# Patient Record
Sex: FEMALE | Race: WHITE | NOT HISPANIC OR LATINO | Employment: OTHER | URBAN - METROPOLITAN AREA
[De-identification: names, ages, dates, MRNs, and addresses within clinical notes are randomized per-mention and may not be internally consistent; named-entity substitution may affect disease eponyms.]

---

## 2017-01-03 ENCOUNTER — APPOINTMENT (OUTPATIENT)
Dept: LAB | Facility: CLINIC | Age: 62
End: 2017-01-03
Payer: COMMERCIAL

## 2017-01-03 ENCOUNTER — TRANSCRIBE ORDERS (OUTPATIENT)
Dept: LAB | Facility: CLINIC | Age: 62
End: 2017-01-03

## 2017-01-03 DIAGNOSIS — I10 ESSENTIAL HYPERTENSION, MALIGNANT: Primary | ICD-10-CM

## 2017-01-03 LAB — VENIPUNCTURE: NORMAL

## 2017-01-03 PROCEDURE — 36415 COLL VENOUS BLD VENIPUNCTURE: CPT | Performed by: NURSE PRACTITIONER

## 2017-01-05 ENCOUNTER — LAB CONVERSION - ENCOUNTER (OUTPATIENT)
Dept: OTHER | Facility: OTHER | Age: 62
End: 2017-01-05

## 2017-01-05 ENCOUNTER — GENERIC CONVERSION - ENCOUNTER (OUTPATIENT)
Dept: OTHER | Facility: OTHER | Age: 62
End: 2017-01-05

## 2017-01-05 LAB
T4 FREE SERPL-MCNC: 1.2 NG/DL (ref 0.8–1.8)
TSH SERPL DL<=0.05 MIU/L-ACNC: 6.16 MIU/L (ref 0.4–4.5)

## 2017-01-19 ENCOUNTER — ALLSCRIPTS OFFICE VISIT (OUTPATIENT)
Dept: OTHER | Facility: OTHER | Age: 62
End: 2017-01-19

## 2017-01-19 DIAGNOSIS — Z12.31 ENCOUNTER FOR SCREENING MAMMOGRAM FOR MALIGNANT NEOPLASM OF BREAST: ICD-10-CM

## 2017-02-27 ENCOUNTER — GENERIC CONVERSION - ENCOUNTER (OUTPATIENT)
Dept: OTHER | Facility: OTHER | Age: 62
End: 2017-02-27

## 2017-04-14 ENCOUNTER — GENERIC CONVERSION - ENCOUNTER (OUTPATIENT)
Dept: OTHER | Facility: OTHER | Age: 62
End: 2017-04-14

## 2017-04-19 ENCOUNTER — TRANSCRIBE ORDERS (OUTPATIENT)
Dept: LAB | Facility: CLINIC | Age: 62
End: 2017-04-19

## 2017-04-19 ENCOUNTER — APPOINTMENT (OUTPATIENT)
Dept: LAB | Facility: CLINIC | Age: 62
End: 2017-04-19
Payer: COMMERCIAL

## 2017-04-19 DIAGNOSIS — E78.2 MIXED HYPERLIPIDEMIA: ICD-10-CM

## 2017-04-19 DIAGNOSIS — I10 ESSENTIAL HYPERTENSION, MALIGNANT: Primary | ICD-10-CM

## 2017-04-19 DIAGNOSIS — R94.6 NONSPECIFIC ABNORMAL RESULTS OF THYROID FUNCTION STUDY: ICD-10-CM

## 2017-04-19 DIAGNOSIS — I10 ESSENTIAL HYPERTENSION, MALIGNANT: ICD-10-CM

## 2017-04-19 LAB
ALBUMIN SERPL BCP-MCNC: 3.8 G/DL (ref 3.5–5)
ALP SERPL-CCNC: 80 U/L (ref 46–116)
ALT SERPL W P-5'-P-CCNC: 59 U/L (ref 12–78)
ANION GAP SERPL CALCULATED.3IONS-SCNC: 7 MMOL/L (ref 4–13)
AST SERPL W P-5'-P-CCNC: 25 U/L (ref 5–45)
BILIRUB SERPL-MCNC: 0.42 MG/DL (ref 0.2–1)
BUN SERPL-MCNC: 12 MG/DL (ref 5–25)
CALCIUM SERPL-MCNC: 9.3 MG/DL (ref 8.3–10.1)
CHLORIDE SERPL-SCNC: 102 MMOL/L (ref 100–108)
CHOLEST SERPL-MCNC: 226 MG/DL (ref 50–200)
CO2 SERPL-SCNC: 29 MMOL/L (ref 21–32)
CREAT SERPL-MCNC: 0.66 MG/DL (ref 0.6–1.3)
GFR SERPL CREATININE-BSD FRML MDRD: >60 ML/MIN/1.73SQ M
GLUCOSE P FAST SERPL-MCNC: 97 MG/DL (ref 65–99)
HDLC SERPL-MCNC: 61 MG/DL (ref 40–60)
LDLC SERPL CALC-MCNC: 138 MG/DL (ref 0–100)
POTASSIUM SERPL-SCNC: 3.9 MMOL/L (ref 3.5–5.3)
PROT SERPL-MCNC: 7.3 G/DL (ref 6.4–8.2)
SODIUM SERPL-SCNC: 138 MMOL/L (ref 136–145)
T3FREE SERPL-MCNC: 1.94 PG/ML (ref 2.3–4.2)
T4 FREE SERPL-MCNC: 0.86 NG/DL (ref 0.76–1.46)
TRIGL SERPL-MCNC: 134 MG/DL
TSH SERPL DL<=0.05 MIU/L-ACNC: 10.9 UIU/ML (ref 0.36–3.74)

## 2017-04-19 PROCEDURE — 80061 LIPID PANEL: CPT | Performed by: NURSE PRACTITIONER

## 2017-04-19 PROCEDURE — 80053 COMPREHEN METABOLIC PANEL: CPT | Performed by: NURSE PRACTITIONER

## 2017-04-19 PROCEDURE — 84439 ASSAY OF FREE THYROXINE: CPT

## 2017-04-19 PROCEDURE — 84481 FREE ASSAY (FT-3): CPT

## 2017-04-19 PROCEDURE — 36415 COLL VENOUS BLD VENIPUNCTURE: CPT | Performed by: NURSE PRACTITIONER

## 2017-04-19 PROCEDURE — 84443 ASSAY THYROID STIM HORMONE: CPT | Performed by: NURSE PRACTITIONER

## 2017-04-20 ENCOUNTER — GENERIC CONVERSION - ENCOUNTER (OUTPATIENT)
Dept: OTHER | Facility: OTHER | Age: 62
End: 2017-04-20

## 2017-05-10 ENCOUNTER — ALLSCRIPTS OFFICE VISIT (OUTPATIENT)
Dept: OTHER | Facility: OTHER | Age: 62
End: 2017-05-10

## 2017-06-23 ENCOUNTER — GENERIC CONVERSION - ENCOUNTER (OUTPATIENT)
Dept: OTHER | Facility: OTHER | Age: 62
End: 2017-06-23

## 2017-07-21 ENCOUNTER — APPOINTMENT (OUTPATIENT)
Dept: LAB | Facility: CLINIC | Age: 62
End: 2017-07-21
Payer: COMMERCIAL

## 2017-07-21 ENCOUNTER — TRANSCRIBE ORDERS (OUTPATIENT)
Dept: LAB | Facility: CLINIC | Age: 62
End: 2017-07-21

## 2017-07-21 DIAGNOSIS — E03.9 UNSPECIFIED HYPOTHYROIDISM: ICD-10-CM

## 2017-07-21 DIAGNOSIS — E03.9 UNSPECIFIED HYPOTHYROIDISM: Primary | ICD-10-CM

## 2017-07-21 LAB
T3FREE SERPL-MCNC: 2.26 PG/ML (ref 2.3–4.2)
T4 FREE SERPL-MCNC: 1.22 NG/DL (ref 0.76–1.46)
TSH SERPL DL<=0.05 MIU/L-ACNC: 4.02 UIU/ML (ref 0.36–3.74)

## 2017-07-21 PROCEDURE — 84481 FREE ASSAY (FT-3): CPT

## 2017-07-21 PROCEDURE — 84443 ASSAY THYROID STIM HORMONE: CPT

## 2017-07-21 PROCEDURE — 36415 COLL VENOUS BLD VENIPUNCTURE: CPT

## 2017-07-21 PROCEDURE — 84439 ASSAY OF FREE THYROXINE: CPT

## 2017-07-24 ENCOUNTER — GENERIC CONVERSION - ENCOUNTER (OUTPATIENT)
Dept: OTHER | Facility: OTHER | Age: 62
End: 2017-07-24

## 2017-08-09 ENCOUNTER — ALLSCRIPTS OFFICE VISIT (OUTPATIENT)
Dept: OTHER | Facility: OTHER | Age: 62
End: 2017-08-09

## 2017-10-02 DIAGNOSIS — E03.9 HYPOTHYROIDISM: ICD-10-CM

## 2017-10-02 DIAGNOSIS — E78.2 MIXED HYPERLIPIDEMIA: ICD-10-CM

## 2017-10-02 DIAGNOSIS — I10 ESSENTIAL (PRIMARY) HYPERTENSION: ICD-10-CM

## 2017-10-05 ENCOUNTER — GENERIC CONVERSION - ENCOUNTER (OUTPATIENT)
Dept: OTHER | Facility: OTHER | Age: 62
End: 2017-10-05

## 2017-11-10 ENCOUNTER — GENERIC CONVERSION - ENCOUNTER (OUTPATIENT)
Dept: OTHER | Facility: OTHER | Age: 62
End: 2017-11-10

## 2017-11-10 LAB — INTERPRETATION (HISTORICAL): NORMAL

## 2017-11-15 ENCOUNTER — ALLSCRIPTS OFFICE VISIT (OUTPATIENT)
Dept: OTHER | Facility: OTHER | Age: 62
End: 2017-11-15

## 2017-11-17 NOTE — PROGRESS NOTES
Assessment    1  Anxiety (300 00) (F41 9)   2  Acquired hypothyroidism (244 9) (E03 9)    Plan  Acquired hypothyroidism    · (1) FREE T3; Status:Active; Requested for:41Snt2953;    · (Q) T4, FREE, DIRECT DIALYSIS AND T4, TOTAL; Status:Active; Requestedfor:85Rly8364;    · (Q) TSH, 3RD GENERATION; Status:Active; Requested for:06Qrd3918; Anxiety    · Escitalopram Oxalate 5 MG Oral Tablet; TAKE 1 TABLET DAILY  Postprocedural hypothyroidism    · Levothyroxine Sodium 125 MCG Oral Tablet; 1 every day    Discussion/Summary    Free thyroid hormone levels are normal at this time  Minor reduction in TSH  Given patient's depression symptoms would avoid adjustment of thyroid medication at this time, repeat levels in six weeks to monitor for stability at goal  Advised she may do the blood work sooner if not feeling well   to the office in two weeks for re-evaluation of depression and anxiety  SSRI medication use discussed  The patient was counseled regarding diagnostic results,-- instructions for management,-- risk factor reductions,-- impressions,-- risks and benefits of treatment options,-- importance of compliance with treatment,-- lab review with lengthy Q&A to pt stated satisfaction as well as intake re increased depression and anxiety sx with m2keoedf cbt session during ov today  total time of encounter was 30 minutes-- and-- >50% minutes was spent counseling  Possible side effects of new medications were reviewed with the patient/guardian today  The treatment plan was reviewed with the patient/guardian  The patient/guardian understands and agrees with the treatment plan      Chief Complaint  F/U labs  ck/lpn      History of Present Illness  Patient here for lab follow-up and chronic problem visit  Ruben Gonzalez has acquired hypothyroidism post surgical removal of the thyroid  Medication increased within the past 3 to 6 months   She also has an associated hyperlipidemia that is in ongoing monitoring awaiting a euthyroid state   complaint of increased anxiety  Patient reports over the past few weeks she has noted increased anxiety and depression symptoms  She denies suicidal or homicidal ideation  She does note some minor difficulty getting in staying to sleep  She does know increase tearful episodes and increased stress  She periodically takes lorazepam for her anxiety with improvement in symptoms that is temporary  She notes a reduced ability to cope with increased stressors over the past few weeks/same time line noting increased anxiety  Review of Systems   Constitutional: No fever, no chills, feels well, no tiredness, no recent weight gain or weight loss  Eyes: No complaints of eye pain, no red eyes, no eyesight problems, no discharge, no dry eyes, no itching of eyes  ENT: no complaints of earache, no loss of hearing, no nose bleeds, no nasal discharge, no sore throat, no hoarseness  Cardiovascular: No complaints of slow heart rate, no fast heart rate, no chest pain, no palpitations, no leg claudication, no lower extremity edema  Respiratory: No complaints of shortness of breath, no wheezing, no cough, no SOB on exertion, no orthopnea, no PND  Gastrointestinal: No complaints of abdominal pain, no constipation, no nausea or vomiting, no diarrhea, no bloody stools  Genitourinary: No complaints of dysuria, no incontinence, no pelvic pain, no dysmenorrhea, no vaginal discharge or bleeding  Musculoskeletal: No complaints of arthralgias, no myalgias, no joint swelling or stiffness, no limb pain or swelling  Integumentary: No complaints of skin rash or lesions, no itching, no skin wounds, no breast pain or lump  Neurological: No complaints of headache, no confusion, no convulsions, no numbness, no dizziness or fainting, no tingling, no limb weakness, no difficulty walking    Psychiatric: anxiety,-- sleep disturbances-- and-- depression, but-- as noted in HPI-- and-- not suicidal   Endocrine: No complaints of proptosis, no hot flashes, no muscle weakness, no deepening of the voice, no feelings of weakness  Hematologic/Lymphatic: No complaints of swollen glands, no swollen glands in the neck, does not bleed easily, does not bruise easily  Active Problems    1  Abdominal wall hematoma, initial encounter (922 2) (S30 1XXA)   2  Acquired hypothyroidism (244 9) (E03 9)   3  Acute bacterial sinusitis (461 9) (J01 90,B96 89)   4  Anxiety (300 00) (F41 9)   5  Benign essential hypertension (401 1) (I10)   6  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)   7  Encounter for Papanicolaou cervical smear to confirm findings of recent normal smear following initial abnormal smear (V72 32) (Z01 42)   8  Encounter for screening for malignant neoplasm of colon (V76 51) (Z12 11)   9  Encounter for screening mammogram for breast cancer (V76 12) (Z12 31)   10  Low TSH level (794 5) (R94 6)   11  Mixed hyperlipidemia (272 2) (E78 2)   12  Need for prophylactic vaccination and inoculation against influenza (V04 81) (Z23)   13  Other specified fever (780 60) (R50 9)   14  Postprocedural hypothyroidism (244 0) (E89 0)   15  Thyroid nodule (241 0) (E04 1)    Past Medical History    1  History of Acute upper respiratory infection (465 9) (J06 9)   2  History of Cervicalgia (723 1) (M54 2)   3  History of Dysfunction of Eustachian tube, unspecified laterality (381 81) (H69 80)   4  History of acute bronchitis (V12 69) (Z87 09)   5  History of acute pharyngitis (V12 69) (Z87 09)   6  History of acute sinusitis (V12 69) (Z87 09)   7  History of acute sinusitis (V12 69) (Z87 09)   8  History of fatigue (V13 89) (Z87 898)   9  History of urinary tract infection (V13 02) (Z87 440)   10  History of Meralgia paresthetica of left side (355 1) (G57 12)   11  History of Meralgia paresthetica of right side (355 1) (G57 11)   12  History of Neck Sprain (847 0)   13  Need for prophylactic vaccination and inoculation against influenza (V04 81) (Z23)   14   History of Pain, joint, shoulder (719 41) (M23 384)   15  History of Viral Labyrinthitis (386 35)    Surgical History  1  History of Breast Surgery   2  History of Thyroid Surgery Total Thyroidectomy    Family History  Mother    1  Family history of    2  Family history of arthritis (V17 7) (Z82 61)   3  Family history of Lung Cancer (V16 1)   4  Family history of Mother  At Age 70  Father    11  Family history of    6  Family history of Father  At Age 65   7  Family history of Lung Cancer (V16 1)  Brother    8  Family history of chronic obstructive pulmonary disease (V17 6) (Z82 5)  Maternal Grandmother    9  Family history of diabetes mellitus (V18 0) (Z83 3)  Paternal Grandmother    8  Family history of diabetes mellitus (V18 0) (Z83 3)  Family History    11  Denied: Family history of Colon Cancer   12  Denied: Family history of Crohn's Disease   13  Denied: Family history of Liver Cancer    The family history was reviewed and updated today  Social History     · Being A Social Drinker   · Caffeine use   · Former smoker (V15 82) (M10 992)   · No drug use  The social history was reviewed and updated today  Current Meds   1  Atenolol 25 MG Oral Tablet; TAKE 1 TABLET BY MOUTH ONCE A DAY AT 8PM; Therapy: 34PBF1512 to (Last Rx:74Qdi3158)  Requested for: 69Hzj7450 Ordered   2  Levothyroxine Sodium 125 MCG Oral Tablet; 1 every day  Requested for: 61Pus1167; Last Rx:84Mer5438 Ordered   3  LORazepam 0 5 MG Oral Tablet; TAKE ONE TABLET BY MOUTH IN THE MORNING AS NEEDED; Therapy: 04KHE7584 to (Last Rx:29Vcq4465)  Requested for: 95Kik8583 Ordered    The medication list was reviewed and updated today  Allergies  1   No Known Drug Allergies    Vitals  Vital Signs    Recorded: 96ESI2740 02:30PM   Temperature 97 7 F, Tympanic   Heart Rate 66, R Radial   Pulse Quality Normal, R Radial   Respiration Quality Normal   Respiration 14   Systolic 948, RUE, Sitting   Diastolic 80, RUE, Sitting   Height 5 ft 5 in   Weight 159 lb    BMI Calculated 26 46   BSA Calculated 1 79       Physical Exam   Constitutional  General appearance: No acute distress, well appearing and well nourished  Eyes  Conjunctiva and lids: No swelling, erythema or discharge  Ears, Nose, Mouth, and Throat  External inspection of ears and nose: Normal    Pulmonary  Respiratory effort: No increased work of breathing or signs of respiratory distress  Auscultation of lungs: Clear to auscultation  Cardiovascular  Auscultation of heart: Normal rate and rhythm, normal S1 and S2, without murmurs  Examination of extremities for edema and/or varicosities: Normal    Psychiatric  Orientation to person, place, and time: Normal    Mood and affect: Abnormal   Mood and Affect: appropriate affect,-- anxious,-- concerned,-- depressed,-- excessive crying,-- frustrated,-- not inconsolable,-- not indifferent,-- irritable,-- sad,-- tearful-- and-- Denies suicidal/homicidal ideation  Results/Data  PHQ-2 Adult Depression Screening 21TCB6431 02:35PM User, s     Test Name Result Flag Reference   PHQ-2 Adult Depression Score 0       Over the last two weeks, how often have you been bothered by any of the following problems? Little interest or pleasure in doing things: Not at all - 0 Feeling down, depressed, or hopeless: Not at all - 0   PHQ-2 Adult Depression Screening Negative           Health Management  Encounter for Papanicolaou cervical smear to confirm findings of recent normal smear followinginitial abnormal smear   (LC) Pap IG, HPV-h+lr; every 1 year; Last 98Cba4817; Next Due: 27Lyu4028; Overdue  Encounter for screening for malignant neoplasm of colon   COLONOSCOPY; every 5 years; Last 98INU3380; Next Due: 73Qog8906; Active  Need for prophylactic vaccination and inoculation against influenza   Influenza; every 1 year; Next Due: 01PSQ9349; Overdue    Attending Note  Collaborating Note: I agree with the Advanced Practitioner note        Signatures Electronically signed by : Peter Gutiérrez; Nov 15 2017  5:12PM EST                       (Author)    Electronically signed by : Nakita Tijerina DO; Nov 16 2017  3:58PM EST                       (Author)

## 2017-11-30 ENCOUNTER — GENERIC CONVERSION - ENCOUNTER (OUTPATIENT)
Dept: OTHER | Facility: OTHER | Age: 62
End: 2017-11-30

## 2018-01-11 NOTE — MISCELLANEOUS
Message   Recorded as Task   Date: 03/01/2016 11:22 AM, Created By: Prudence Montiel   Task Name: Medical Complaint Callback   Assigned To: Elwyn Heimlich   Regarding Patient: De Medina, Status: Active   CommentAnise Allen - 01 Mar 2016 11:22 AM     TASK CREATED  Caller: Self; [phone] (Home); [phone] (Work)  DR Shayla Tracy PT WOULD LIKE FOR YOU TO CALL HER TO DISCUSS THE BW   Patricia Ramos - 01 Mar 2016 11:26 AM     TASK REASSIGNED: Previously Assigned To [facility]  - 01 Mar 2016 11:26 AM     TASK REASSIGNED: Previously Assigned To Pete Ham   No other examination other than muscle tear blames the pain in her left flank associated with the hematoma and bruising  Blood tests include CBC, CMP and PT PTT  No additional abnormalities noted  Suggested that in one and a half to 2 weeks from now the pain persists consider CAT scan  Likely the pain will disappear with the a reabsorption of the hematoma        Signatures   Electronically signed by : SRINATH Reno ; Mar  1 2016 12:32PM EST                       (Author)

## 2018-01-11 NOTE — RESULT NOTES
Verified Results  (1) CBC/PLT/DIFF 85XBH0427 10:50AM [facility]     Test Name Result Flag Reference   WHITE BLOOD CELL COUNT 5 6 Thousand/uL  3 8-10 8   RED BLOOD CELL COUNT 4 63 Million/uL  3 80-5 10   HEMOGLOBIN 13 5 g/dL  11 7-15 5   HEMATOCRIT 42 1 %  35 0-45 0   MCV 90 9 fL  80 0-100 0   MCH 29 2 pg  27 0-33 0   MCHC 32 2 g/dL  32 0-36 0   RDW 13 0 %  11 0-15 0   PLATELET COUNT 589 Thousand/uL  140-400   MPV 8 8 fL  7 5-11 5   ABSOLUTE NEUTROPHILS 3326 cells/uL  1317-3580   ABSOLUTE LYMPHOCYTES 1753 cells/uL  850-3900   ABSOLUTE MONOCYTES 358 cells/uL  200-950   ABSOLUTE EOSINOPHILS 134 cells/uL     ABSOLUTE BASOPHILS 28 cells/uL  0-200   NEUTROPHILS 59 4 %     LYMPHOCYTES 31 3 %     MONOCYTES 6 4 %     EOSINOPHILS 2 4 %     BASOPHILS 0 5 %       (1) COMPREHENSIVE METABOLIC PANEL 96OLA6048 87:10NO [facility]     Test Name Result Flag Reference   GLUCOSE 106 mg/dL H 65-99   Fasting reference interval   UREA NITROGEN (BUN) 16 mg/dL  7-25   CREATININE 0 68 mg/dL  0 50-0 99   For patients >52years of age, the reference limit  for Creatinine is approximately 13% higher for people  identified as -American  eGFR NON-AFR   AMERICAN 95 mL/min/1 73m2  > OR = 60   eGFR AFRICAN AMERICAN 110 mL/min/1 73m2  > OR = 60   BUN/CREATININE RATIO   9-07   NOT APPLICABLE (calc)   SODIUM 139 mmol/L  135-146   POTASSIUM 3 9 mmol/L  3 5-5 3   CHLORIDE 102 mmol/L     CARBON DIOXIDE 25 mmol/L  19-30   CALCIUM 10 2 mg/dL  8 6-10 4   PROTEIN, TOTAL 7 1 g/dL  6 1-8 1   ALBUMIN 4 5 g/dL  3 6-5 1   GLOBULIN 2 6 g/dL (calc)  1 9-3 7   ALBUMIN/GLOBULIN RATIO 1 7 (calc)  1 0-2 5   BILIRUBIN, TOTAL 0 5 mg/dL  0 2-1 2   ALKALINE PHOSPHATASE 71 U/L     AST 23 U/L  10-35   ALT 33 U/L H 6-29     (1) PT WITH INR 24Iab9590 10:50AM [facility]     Test Name Result Flag Reference   INR 1 0     Reference Range                     0 9-1 1  Moderate-intensity Warfarin Therapy 2 0-3 0  Higher-intensity Warfarin Therapy 3 0-4 0   PT 10 5 sec  9 0-11 5   For more information on this test, go to:  http://[employer]/faq/XVZ846     (1) APTT 46OAI1131 10:50AM Fabi Penaloza     Test Name Result Flag Reference   PARTIAL THROMBOPLASTIN$TIME, ACTIVATED 27 sec  22-34   This test has not been validated for monitoring  unfractionated heparin therapy  For testing that  is validated for this type of therapy, please refer  to the Heparin Anti-Xa assay (test code 82316)  For additional information, please refer to  http://[employer]/faq/HOB061  (This link is being provided for   informational/educational purposes only )       Discussion/Summary   good results   good results; ever so slight elevatioin of one liver test; recheck 3 months   normal results   normal results

## 2018-01-11 NOTE — RESULT NOTES
Verified Results  (1) LIPID PANEL, FASTING 19Apr2017 09:01AM [email or web address]     Test Name Result Flag Reference   CHOLESTEROL 226 mg/dL H    HDL,DIRECT 61 mg/dL H 40-60   Specimen collection should occur prior to Metamizole administration due to the potential for falsely depressed results  LDL CHOLESTEROL CALCULATED 138 mg/dL H 0-100   Triglyceride:         Normal              <150 mg/dl       Borderline High    150-199 mg/dl       High               200-499 mg/dl       Very High          >499 mg/dl  Cholesterol:         Desirable        <200 mg/dl      Borderline High  200-239 mg/dl      High             >239 mg/dl  HDL Cholesterol:        High    >59 mg/dL      Low     <41 mg/dL  LDL CALCULATED:    This screening LDL is a calculated result  It does not have the accuracy of the Direct Measured LDL in the monitoring of patients with hyperlipidemia and/or statin therapy  Direct Measure LDL (YGD178) must be ordered separately in these patients  TRIGLYCERIDES 134 mg/dL  <=150   Specimen collection should occur prior to N-Acetylcysteine or Metamizole administration due to the potential for falsely depressed results       (1) FREE T3 19Apr2017 09:01AM [email or web address]     Test Name Result Flag Reference   FREE T3 1 94 pg/mL L 2 30-4 20     (1) T4, FREE 52Dyl3419 09:01AM [email or web address]     Test Name Result Flag Reference   T4,FREE 0 86 ng/dL  0 76-1 46     (1) COMPREHENSIVE METABOLIC PANEL 63YTC7595 90:74FC [email or web address]     Test Name Result Flag Reference   SODIUM 138 mmol/L  136-145   POTASSIUM 3 9 mmol/L  3 5-5 3   CHLORIDE 102 mmol/L  100-108   CARBON DIOXIDE 29 mmol/L  21-32   ANION GAP (CALC) 7 mmol/L  4-13   BLOOD UREA NITROGEN 12 mg/dL  5-25   CREATININE 0 66 mg/dL  0 60-1 30   Standardized to IDMS reference method   CALCIUM 9 3 mg/dL  8 3-10 1   BILI, TOTAL 0 42 mg/dL  0 20-1 00   ALK PHOSPHATAS 80 U/L     ALT (SGPT) 59 U/L  12-78   AST(SGOT) 25 U/L  5-45   ALBUMIN 3 8 g/dL  3 5-5 0   TOTAL PROTEIN 7 3 g/dL  6 4-8 2   eGFR Non-African American      >60 0 ml/min/1 73sq m   [facility] [employer] Disease Education Program recommendations are as follows:  GFR calculation is accurate only with a steady state creatinine  Chronic Kidney disease less than 60 ml/min/1 73 sq  meters  Kidney failure less than 15 ml/min/1 73 sq  meters  GLUCOSE FASTING 97 mg/dL  65-99     (1) TSH 19Apr2017 09:01AM Eliezer [facility]     Test Name Result Flag Reference   TSH 10 900 uIU/mL H 0 358-3 740   This bloodwork is non-fasting  Please drink two glasses of water morning of  bloodwork  Patients undergoing fluorescein dye angiography may retain small amounts of fluorescein in the body for 48-72 hours post procedure  Samples containing fluorescein can produce falsely depressed TSH values  If the patient had this procedure,a specimen should be resubmitted post fluorescein clearance            The recommended reference ranges for TSH during pregnancy are as follows:  First trimester 0 1 to 2 5 uIU/mL  Second trimester  0 2 to 3 0 uIU/mL  Third trimester 0 3 to 3 0 uIU/m

## 2018-01-12 NOTE — RESULT NOTES
Verified Results  (1) FREE T3 83Dsp3712 09:58AM [facility]     Test Name Result Flag Reference   FREE T3 2 26 pg/mL L 2 30-4 20     (1) TSH WITH FT4 REFLEX 34Gjy7911 09:58AM [facility]     Test Name Result Flag Reference   TSH 4 020 uIU/mL H 0 358-3 740   Patients undergoing fluorescein dye angiography may retain small amounts of fluorescein in the body for 48-72 hours post procedure  Samples containing fluorescein can produce falsely depressed TSH values  If the patient had this procedure,a specimen should be resubmitted post fluorescein clearance            The recommended reference ranges for TSH during pregnancy are as follows:  First trimester 0 1 to 2 5 uIU/mL  Second trimester  0 2 to 3 0 uIU/mL  Third trimester 0 3 to 3 0 uIU/m     (1) TSH WITH FT4 REFLEX 91Ffo7409 09:58AM Rosendo Rubio     Test Name Result Flag Reference   T4,FREE 1 22 ng/dL  0 76-1 46

## 2018-01-13 VITALS
WEIGHT: 153 LBS | HEIGHT: 65 IN | HEART RATE: 70 BPM | TEMPERATURE: 97.9 F | BODY MASS INDEX: 25.49 KG/M2 | SYSTOLIC BLOOD PRESSURE: 120 MMHG | DIASTOLIC BLOOD PRESSURE: 78 MMHG | RESPIRATION RATE: 16 BRPM

## 2018-01-14 VITALS
SYSTOLIC BLOOD PRESSURE: 130 MMHG | HEART RATE: 66 BPM | DIASTOLIC BLOOD PRESSURE: 80 MMHG | TEMPERATURE: 97.7 F | WEIGHT: 159 LBS | HEIGHT: 65 IN | BODY MASS INDEX: 26.49 KG/M2 | RESPIRATION RATE: 14 BRPM

## 2018-01-14 VITALS
SYSTOLIC BLOOD PRESSURE: 120 MMHG | HEART RATE: 78 BPM | RESPIRATION RATE: 16 BRPM | WEIGHT: 154.6 LBS | OXYGEN SATURATION: 98 % | DIASTOLIC BLOOD PRESSURE: 78 MMHG | TEMPERATURE: 98.2 F | HEIGHT: 65 IN | BODY MASS INDEX: 25.76 KG/M2

## 2018-01-14 VITALS
DIASTOLIC BLOOD PRESSURE: 66 MMHG | BODY MASS INDEX: 25.99 KG/M2 | RESPIRATION RATE: 12 BRPM | HEART RATE: 64 BPM | SYSTOLIC BLOOD PRESSURE: 104 MMHG | HEIGHT: 65 IN | WEIGHT: 156 LBS | TEMPERATURE: 97.8 F

## 2018-01-15 NOTE — PROGRESS NOTES
Assessment    1  Acute sinusitis (461 9) (J01 90)   2  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)    Plan  Acute sinusitis    · Follow Up if Not Better Evaluation and Treatment  Follow-up  Status: Complete  Done:  77FPV4622 09:46AM   · Irrigate your nose twice a day ; Status:Complete;   Done: 76HXN8268 09:46AM   · Call [phone] if: The symptoms are not better in 7 days ; Status:Complete;   Done:  70PJX0605 09:46AM  Diffuse nontoxic goiter    · (Q) TSH, 3RD GENERATION; Status:Active; Requested BQK:83RSZ3686;     Discussion/Summary    Reviewed endocrinology note from 1  Patient needs another TSH  Ultrasound of the thyroid showed multinodules  The patient was counseled regarding instructions for management, importance of compliance with treatment  Chief Complaint  Pt  c/o cough/congestion  ck/lpn      History of Present Illness  Cold Symptoms:   Terri Shah presents with complaints of sudden onset of severe cold symptoms starting 10 days ago  She is currently experiencing cold symptoms  Symptoms are worsening  Risk Factors: no smoking  Associated symptoms include nasal congestion, runny nose, facial pressure, facial pain, headache, wheezing, fatigue, fever and chills  Review of Systems    Constitutional: No fever, no chills, feels well, no tiredness, no recent weight gain or loss  ENT: no ear ache, no loss of hearing, no nosebleeds or nasal discharge, no sore throat or hoarseness  Active Problems    1  Acute sinusitis (461 9) (J01 90)   2  Anxiety (300 00) (F41 9)   3  Benign essential hypertension (401 1) (I10)   4  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)   5  Encounter for screening for malignant neoplasm of colon (V76 51) (Z12 11)   6  Low TSH level (794 5) (R94 6)   7  Meralgia paresthetica of left side (355 1) (G57 12)   8  Meralgia paresthetica of right side (355 1) (G57 11)   9  Need for prophylactic vaccination and inoculation against influenza (V04 81) (Z23)   10   Other specified fever (780 60) (R50 9)   11  Pain, joint, shoulder (719 41) (M25 519)   12  Thyroid nodule (241 0) (E04 1)   13  Urinary tract infection (599 0) (N39 0)    Past Medical History    1  History of Acute upper respiratory infection (465 9) (J06 9)   2  History of Cervicalgia (723 1) (M54 2)   3  History of Dysfunction of eustachian tube, unspecified laterality (381 81) (H69 80)   4  History of acute bronchitis (V12 69) (Z87 09)   5  History of acute pharyngitis (V12 69) (Z87 09)   6  History of acute sinusitis (V12 69) (Z87 09)   7  History of fatigue (V13 89) (Z87 898)   8  History of Neck Sprain (847 0)   9  Need for prophylactic vaccination and inoculation against influenza (V04 81) (Z23)   10  History of Viral Labyrinthitis (386 35)    Family History    1  Family history of    2  Family history of arthritis (V17 7) (Z82 61)   3  Family history of Lung Cancer (V16 1)   4  Family history of Mother  At Age 74    8  Family history of    6  Family history of Father  At Age 65   7  Family history of Lung Cancer (V16 1)    8  Family history of chronic obstructive pulmonary disease (V17 6) (Z82 5)    9  Family history of diabetes mellitus (V18 0) (Z83 3)    10  Family history of diabetes mellitus (V18 0) (Z83 3)    11  Denied: Family history of Colon Cancer   12  Denied: Family history of Crohn's Disease   13  Denied: Family history of Liver Cancer    Social History    · Being A Social Drinker   · Caffeine use   · Former smoker (V15 82) (D41 037)   · No drug use  The social history was reviewed and is unchanged  Surgical History    1  History of Breast Surgery    Current Meds   1  Atenolol 25 MG Oral Tablet; TAKE 1 TABLET DAILY; Therapy: 66QCW1873 to (Evaluate:2016)  Requested for: 80XAV7579; Last   Rx:2015 Ordered   2  LORazepam 0 5 MG Oral Tablet; TAKE ONE TABLET BY MOUTH IN THE MORNING AS   NEEDED;    Therapy: 19GFF7975 to (Last Rx:19Fhl3977)  Requested for: 66ZMN4032; Status:   ACTIVE - Renewal Denied Ordered    The medication list was reviewed and updated today  Allergies    1  No Known Drug Allergies    Vitals   Recorded: 20Jan2016 09:06AM   Temperature 96 7 F   Heart Rate 72   Respiration 16   Systolic 235   Diastolic 80   Height 5 ft 5 in   Weight 149 lb    BMI Calculated 24 79   BSA Calculated 1 75     Physical Exam    Constitutional   General appearance: Abnormal   acutely ill and uncomfortable  Eyes   Conjunctiva and lids: No swelling, erythema or discharge  Ears, Nose, Mouth, and Throat   Otoscopic examination: Tympanic membranes translucent with normal light reflex  Canals patent without erythema  Oropharynx: Normal with no erythema, edema, exudate or lesions  Neck   Neck: Supple, symmetric, trachea midline, no masses  Pulmonary   Respiratory effort: No increased work of breathing or signs of respiratory distress  Percussion of chest: Normal     Palpation of chest: Normal     Auscultation of lungs: Clear to auscultation  Lymphatic   Palpation of lymph nodes in neck: No lymphadenopathy         Signatures   Electronically signed by : SRINATH Rehman ; Jan 20 2016  9:47AM EST                       (Author)

## 2018-01-17 NOTE — RESULT NOTES
Verified Results  (Q) COMPREHENSIVE METABOLIC PANEL W/O eGFR [id number] 08:45AM Neur[facility]     Test Name Result Flag Reference   GLUCOSE 94 mg/dL  65-99   Fasting reference interval   UREA NITROGEN (BUN) 17 mg/dL  7-25   CREATININE 0 78 mg/dL  0 50-0 99   For patients >52years of age, the reference limit  for Creatinine is approximately 13% higher for people  identified as -American  BUN/CREATININE RATIO   7-00   NOT APPLICABLE (calc)   SODIUM 140 mmol/L  135-146   POTASSIUM 4 1 mmol/L  3 5-5 3   ALBUMIN/GLOBULIN RATIO 1 7 (calc)  1 0-2 5   BILIRUBIN, TOTAL 0 5 mg/dL  0 2-1 2   ALKALINE PHOSPHATASE 80 U/L     AST 26 U/L  10-35   ALT 39 U/L H 6-29   CHLORIDE 103 mmol/L     CARBON DIOXIDE 26 mmol/L  20-31   CALCIUM 9 2 mg/dL  8 6-10 4   PROTEIN, TOTAL 6 7 g/dL  6 1-8 1   ALBUMIN 4 2 g/dL  3 6-5 1   GLOBULIN 2 5 g/dL (calc)  1 9-3 7     (1) FREE T3 [id number] 08:45AM Suzette High     Test Name Result Flag Reference   T3, FREE 2 9 pg/mL  2 3-4 2     (Q) LIPID PANEL WITH DIRECT LDL [id number] 08:45AM Suzette High     Test Name Result Flag Reference   CHOLESTEROL, TOTAL 224 mg/dL H 125-200   HDL CHOLESTEROL 49 mg/dL  > OR = 46   TRIGLICERIDES 013 mg/dL H <150   DIRECT  mg/dL H <130   Desirable range <100 mg/dL for patients with CHD or  diabetes and <70 mg/dL for diabetic patients with  known heart disease  CHOL/HDLC RATIO 4 6 (calc)  < OR = 5 0   NON HDL CHOLESTEROL 175 mg/dL (calc) H    Target for non-HDL cholesterol is 30 mg/dL higher than   LDL cholesterol target       (Q) MICROALBUMIN, RANDOM URINE (W/CREATININE) [id number] 08:45AM [facility]     Test Name Result Flag Reference   CREATININE, RANDOM URINE 113 mg/dL     MICROALBUMIN 1 3 mg/dL     Reference Range  Not established   MICROALBUMIN/CREATININE$RATIO, RANDOM URINE 12 mcg/mg creat  <30   The ADA defines abnormalities in albumin  excretion as follows:     Category         Result (mcg/mg creatinine)     Normal <30  Microalbuminuria            Clinical albuminuria   > OR = 300     The ADA recommends that at least two of three  specimens collected within a 3-6 month period be  abnormal before considering a patient to be  within a diagnostic category  (Q) TSH, 3RD GENERATION W/REFLEX TO FT4 [id number] 08:45AM Tonya Book     Test Name Result Flag Reference   T4, FREE 1 2 ng/dL  0 8-1 8   TSH W/REFLEX TO FT4 6 16 mIU/L H 0 40-4 50       Discussion/Summary   Pete dodson, you have several abnormal labs we need to address at your next visit  Your thyroid, cholesterol and chemistry need work  We will work on these at your next visit soon  SOPHIA Farr NP

## 2018-01-22 VITALS
BODY MASS INDEX: 25.83 KG/M2 | DIASTOLIC BLOOD PRESSURE: 78 MMHG | SYSTOLIC BLOOD PRESSURE: 124 MMHG | HEIGHT: 65 IN | TEMPERATURE: 97.8 F | WEIGHT: 155 LBS | RESPIRATION RATE: 12 BRPM | HEART RATE: 64 BPM

## 2018-02-09 DIAGNOSIS — I10 ESSENTIAL HYPERTENSION: Primary | ICD-10-CM

## 2018-02-13 RX ORDER — ATENOLOL 25 MG/1
TABLET ORAL
Qty: 90 TABLET | Refills: 1 | Status: SHIPPED | OUTPATIENT
Start: 2018-02-13 | End: 2018-05-15 | Stop reason: SDUPTHER

## 2018-05-10 LAB
ALBUMIN SERPL-MCNC: 4.3 G/DL (ref 3.6–5.1)
ALBUMIN/CREAT UR: 7 MCG/MG CREAT
ALBUMIN/GLOB SERPL: 1.8 (CALC) (ref 1–2.5)
ALP SERPL-CCNC: 76 U/L (ref 33–130)
ALT SERPL-CCNC: 34 U/L (ref 6–29)
AST SERPL-CCNC: 22 U/L (ref 10–35)
BILIRUB SERPL-MCNC: 0.5 MG/DL (ref 0.2–1.2)
BUN SERPL-MCNC: 19 MG/DL (ref 7–25)
BUN/CREAT SERPL: ABNORMAL (CALC) (ref 6–22)
CALCIUM SERPL-MCNC: 9.7 MG/DL (ref 8.6–10.4)
CHLORIDE SERPL-SCNC: 105 MMOL/L (ref 98–110)
CHOLEST SERPL-MCNC: 207 MG/DL
CHOLEST/HDLC SERPL: 4.1 (CALC)
CO2 SERPL-SCNC: 26 MMOL/L (ref 20–31)
CREAT SERPL-MCNC: 0.76 MG/DL (ref 0.5–0.99)
CREAT UR-MCNC: 190 MG/DL (ref 20–320)
GLOBULIN SER CALC-MCNC: 2.4 G/DL (CALC) (ref 1.9–3.7)
GLUCOSE SERPL-MCNC: 88 MG/DL (ref 65–99)
HDLC SERPL-MCNC: 50 MG/DL
LDLC SERPL CALC-MCNC: 128 MG/DL (CALC)
MICROALBUMIN UR-MCNC: 1.3 MG/DL
NONHDLC SERPL-MCNC: 157 MG/DL (CALC)
POTASSIUM SERPL-SCNC: 4.2 MMOL/L (ref 3.5–5.3)
PROT SERPL-MCNC: 6.7 G/DL (ref 6.1–8.1)
SODIUM SERPL-SCNC: 139 MMOL/L (ref 135–146)
T3FREE SERPL-MCNC: 3.4 PG/ML (ref 2.3–4.2)
T4 FREE SERPL-MCNC: 1.8 NG/DL (ref 0.8–1.8)
TRIGL SERPL-MCNC: 171 MG/DL
TSH SERPL-ACNC: 0.03 MIU/L (ref 0.4–4.5)

## 2018-05-11 ENCOUNTER — TELEPHONE (OUTPATIENT)
Dept: FAMILY MEDICINE CLINIC | Facility: CLINIC | Age: 63
End: 2018-05-11

## 2018-05-11 NOTE — TELEPHONE ENCOUNTER
----- Message from Dilip Lewis, [address] sent at 5/10/2018  5:26 PM EDT -----  Call the patient and put her in for an appointment with me next week to discuss her abnormal blood work  She is also multi chronic problem follow-up so give her two blocks of time

## 2018-05-14 PROBLEM — E78.2 MIXED HYPERLIPIDEMIA: Status: ACTIVE | Noted: 2017-01-19

## 2018-05-14 PROBLEM — E89.0 POSTPROCEDURAL HYPOTHYROIDISM: Status: ACTIVE | Noted: 2017-05-10

## 2018-05-15 ENCOUNTER — OFFICE VISIT (OUTPATIENT)
Dept: FAMILY MEDICINE CLINIC | Facility: CLINIC | Age: 63
End: 2018-05-15
Payer: COMMERCIAL

## 2018-05-15 VITALS
HEART RATE: 72 BPM | BODY MASS INDEX: 25.96 KG/M2 | TEMPERATURE: 97.3 F | SYSTOLIC BLOOD PRESSURE: 130 MMHG | DIASTOLIC BLOOD PRESSURE: 80 MMHG | WEIGHT: 156 LBS | RESPIRATION RATE: 12 BRPM

## 2018-05-15 DIAGNOSIS — I10 ESSENTIAL HYPERTENSION: ICD-10-CM

## 2018-05-15 DIAGNOSIS — I10 BENIGN ESSENTIAL HYPERTENSION: Primary | ICD-10-CM

## 2018-05-15 DIAGNOSIS — E78.2 MIXED HYPERLIPIDEMIA: ICD-10-CM

## 2018-05-15 PROCEDURE — 3075F SYST BP GE 130 - 139MM HG: CPT | Performed by: NURSE PRACTITIONER

## 2018-05-15 PROCEDURE — 99213 OFFICE O/P EST LOW 20 MIN: CPT | Performed by: NURSE PRACTITIONER

## 2018-05-15 PROCEDURE — 3079F DIAST BP 80-89 MM HG: CPT | Performed by: NURSE PRACTITIONER

## 2018-05-15 RX ORDER — ATENOLOL 25 MG/1
TABLET ORAL
COMMUNITY
Start: 2012-05-04 | End: 2018-05-15 | Stop reason: SDUPTHER

## 2018-05-15 RX ORDER — ATENOLOL 25 MG/1
25 TABLET ORAL DAILY
Qty: 90 TABLET | Refills: 1 | Status: SHIPPED | OUTPATIENT
Start: 2018-05-15 | End: 2019-09-12 | Stop reason: SDUPTHER

## 2018-05-15 RX ORDER — PHENYLEPHRINE HCL 10 MG
1 TABLET ORAL 2 TIMES DAILY
Qty: 60 CAPSULE | Refills: 11
Start: 2018-05-15 | End: 2019-06-17 | Stop reason: ALTCHOICE

## 2018-05-15 RX ORDER — ESCITALOPRAM OXALATE 10 MG/1
TABLET ORAL
COMMUNITY
Start: 2018-02-25 | End: 2018-05-31 | Stop reason: SDUPTHER

## 2018-05-15 RX ORDER — LEVOTHYROXINE SODIUM 100 UG/1
TABLET ORAL
COMMUNITY
Start: 2018-05-10 | End: 2019-01-28 | Stop reason: ALTCHOICE

## 2018-05-15 NOTE — PATIENT INSTRUCTIONS
Begin a low glycemic diet like the [facility] [facility]  Will recheck her cholesterol in six months that will give time for your thyroid to correct  Add fish oil one capsule twice a day take it with food  If he also take vitamin-D take that at the same time you take your fish oil absorb it better  You will be due for a checkup in six months after your blood work is complete

## 2018-05-15 NOTE — PROGRESS NOTES
Chief Complaint   Patient presents with    Follow-up     labs        Patient ID: Tabatha Martinez is a 58 y o  female  Marta Ghosh is here today for chronic problem checkup in serum review  Her most recent lab testing shows a low TSH  She also follows with endocrine for her thyroid disorder  She is post surgical thyroidectomy and thyroid ablation with supplementation with Uni thyroid at this time  She had a visit with her endocrinologist within the past week and her labs were addressed her thyroid supplementation dose was downward adjusted she is currently taking any thyroid 100 mcg daily  She denies any endocrine symptoms of concern or cardiovascular symptoms of concern at the time of the visit  Patient also has some elevation in her lipid profile today see the thyroid disorder above this may be a feet forward for her lipid abnormalities today  She denies any cardiovascular symptoms of concern at the time of the visit  She denies family history of early death from coronary artery disease and she has no known history of coronary artery disease herself  Hypertension:Pt presents for follow up hypertension  Reports medication compliance with meds as listed in medication list  Pt denies cardiovascular sx of concern or new events at the time of the visit or since the previous visit   BP readings outside the office as reported by the patient are normotensive/at goal          Past Medical History:   Diagnosis Date    Disease of thyroid gland     Hypertension        Past Surgical History:   Procedure Laterality Date    BREAST LUMPECTOMY Left     Benign    THYROID SURGERY      Total Thyroidectomy       Patient Active Problem List   Diagnosis    Postprocedural hypothyroidism    Mixed hyperlipidemia    Diffuse nontoxic goiter    Benign essential hypertension    Anxiety       Family History   Problem Relation Age of Onset    Arthritis Mother     Lung cancer Mother     Lung cancer Father     COPD Brother  Diabetes Maternal Grandmother      Mellitus    Diabetes Paternal Grandmother      Mellitus       Immunization History   Administered Date(s) Administered    Influenza TIV (IM) 11/19/2013       No Known Allergies    Current Outpatient Prescriptions   Medication Sig Dispense Refill    atenolol (TENORMIN) 25 mg tablet Take 1 tablet (25 mg total) by mouth daily At 8 PM 90 tablet 1    escitalopram (LEXAPRO) 10 mg tablet       UNITHROID 100 MCG tablet       Omega-3 Fatty Acids (FISH OIL DOUBLE STRENGTH) 1200 MG CAPS Take 1 capsule (1,200 mg total) by mouth 2 (two) times a day 60 capsule 11     No current facility-administered medications for this visit  Social History     Social History    Marital status:      Spouse name: N/A    Number of children: N/A    Years of education: N/A     Social History Main Topics    Smoking status: Former Smoker    Smokeless tobacco: Never Used    Alcohol use Yes      Comment: Social    Drug use: No    Sexual activity: Not Asked     Other Topics Concern    None     Social History Narrative    Caffeine use       Review of Systems   Constitutional: Negative  HENT: Negative  Eyes: Negative  Respiratory: Negative  Cardiovascular: Negative  Gastrointestinal: Negative  Endocrine: Negative  Genitourinary: Negative  Musculoskeletal: Negative  Skin: Negative  Allergic/Immunologic: Negative  Neurological: Negative  Hematological: Negative  Psychiatric/Behavioral: Negative  Objective:    /80   Pulse 72   Temp (!) 97 3 °F (36 3 °C) (Temporal)   Resp 12   Wt 70 8 kg (156 lb)   BMI 25 96 kg/m²        Physical Exam   Constitutional: She is oriented to person, place, and time  She appears well-developed and well-nourished  No distress  HENT:   Head: Normocephalic  Right Ear: External ear normal    Left Ear: External ear normal    Eyes: Conjunctivae are normal  No scleral icterus  Neck: No JVD present  Cardiovascular: Normal rate, regular rhythm and normal heart sounds  Pulmonary/Chest: Effort normal and breath sounds normal    Musculoskeletal: She exhibits no edema  Neurological: She is alert and oriented to person, place, and time  Skin: Skin is warm and dry  Psychiatric: She has a normal mood and affect  Orders Only on 05/09/2018   Component Date Value Ref Range Status    Total Cholesterol 05/09/2018 207* <200 mg/dL Final    SL AMB HDL CHOLESTEROL 05/09/2018 50* >50 mg/dL Final    Triglycerides 05/09/2018 171* <150 mg/dL Final    SL AMB LDL-CHOLESTEROL 05/09/2018 128* mg/dL (calc) Final    Comment: Reference range: <100     Desirable range <100 mg/dL for primary prevention;    <70 mg/dL for patients with CHD or diabetic patients   with > or = 2 CHD risk factors  LDL-C is now calculated using the Reagan-Machado   calculation, which is a validated novel method providing   better accuracy than the Friedewald equation in the   estimation of LDL-C  [place] [address];938(20): 7865-4846   (http://[employer]/faq/ANL257)      SL AMB CHOL/HDLC RATIO 05/09/2018 4 1  <5 0 (calc) Final    SL AMB NON HDL CHOLESTEROL 05/09/2018 157* <130 mg/dL (calc) Final    Comment: For patients with diabetes plus 1 major ASCVD risk   factor, treating to a non-HDL-C goal of <100 mg/dL   (LDL-C of <70 mg/dL) is considered a therapeutic   option   SL AMB GLUCOSE 05/09/2018 88  65 - 99 mg/dL Final    Comment:               Fasting reference interval         BUN 05/09/2018 19  7 - 25 mg/dL Final    Creatinine, Serum 05/09/2018 0 76  0 50 - 0 99 mg/dL Final    Comment: For patients >52years of age, the reference limit  for Creatinine is approximately 13% higher for people  identified as -American           SL AMB BUN/CREATININE RATIO 78/41/3413 NOT APPLICABLE  6 - 22 (calc) Final    SL AMB SODIUM 05/09/2018 139  135 - 146 mmol/L Final    SL AMB POTASSIUM 05/09/2018 4 2 3 5 - 5 3 mmol/L Final    SL AMB CHLORIDE 05/09/2018 105  98 - 110 mmol/L Final    SL AMB CARBON DIOXIDE 05/09/2018 26  20 - 31 mmol/L Final    SL AMB CALCIUM 05/09/2018 9 7  8 6 - 10 4 mg/dL Final    SL AMB PROTEIN, TOTAL 05/09/2018 6 7  6 1 - 8 1 g/dL Final    Serum Albumin 05/09/2018 4 3  3 6 - 5 1 g/dL Final    SL AMB GLOBULIN 05/09/2018 2 4  1 9 - 3 7 g/dL (calc) Final    SL AMB ALBUMIN/GLOBULIN RATIO 05/09/2018 1 8  1 0 - 2 5 (calc) Final    SL AMB BILIRUBIN, TOTAL 05/09/2018 0 5  0 2 - 1 2 mg/dL Final    SL AMB ALKALINE PHOSPHATASE 05/09/2018 76  33 - 130 U/L Final    SL AMB AST 05/09/2018 22  10 - 35 U/L Final    SL AMB ALT 05/09/2018 34* 6 - 29 U/L Final    Creatinine, Urine 05/09/2018 190  20 - 320 mg/dL Final    Microalbum  ,U,Random 05/09/2018 1 3  See Note: mg/dL Final    Comment: Reference Range:  Reference Range  Not established      Microalb/Creat Ratio 05/09/2018 7  <30 mcg/mg creat Final    Comment:    The ADA defines abnormalities in albumin  excretion as follows:     Category         Result (mcg/mg creatinine)     Normal                    <30  Microalbuminuria            Clinical albuminuria   > OR = 300     The ADA recommends that at least two of three  specimens collected within a 3-6 month period be  abnormal before considering a patient to be  within a diagnostic category   Free t4 05/09/2018 1 8  0 8 - 1 8 ng/dL Final    TSH 05/09/2018 0 03* 0 40 - 4 50 mIU/L Final    Free T3 05/09/2018 3 4  2 3 - 4 2 pg/mL Final         Assessment/Plan:    No problem-specific Assessment & Plan notes found for this encounter  Diagnoses and all orders for this visit:    Benign essential hypertension  -     Lipid panel; Future  -     Comprehensive metabolic panel; Future  -     TSH, 3rd generation with T4 reflex; Future  -     atenolol (TENORMIN) 25 mg tablet; Take 1 tablet (25 mg total) by mouth daily At 8 PM    Mixed hyperlipidemia  -     Lipid panel;  Future  - Comprehensive metabolic panel; Future  -     TSH, 3rd generation with T4 reflex; Future  -     Omega-3 Fatty Acids (FISH OIL DOUBLE STRENGTH) 1200 MG CAPS; Take 1 capsule (1,200 mg total) by mouth 2 (two) times a day    Essential hypertension  -     atenolol (TENORMIN) 25 mg tablet; Take 1 tablet (25 mg total) by mouth daily At 8 PM    Other orders  -     escitalopram (LEXAPRO) 10 mg tablet;   -     UNITHROID 100 MCG tablet;   -     Discontinue: atenolol (TENORMIN) 25 mg tablet; Take by mouth            Patient Instructions   Begin a low glycemic diet like the [facility]  Will recheck her cholesterol in six months that will give time for your thyroid to correct  Add fish oil one capsule twice a day take it with food  If he also take vitamin-D take that at the same time you take your fish oil absorb it better  You will be due for a checkup in six months after your blood work is complete                      Theo Nicole

## 2018-05-31 DIAGNOSIS — F41.9 ANXIETY: Primary | ICD-10-CM

## 2018-05-31 RX ORDER — ESCITALOPRAM OXALATE 10 MG/1
TABLET ORAL
Qty: 90 TABLET | Refills: 1 | Status: SHIPPED | OUTPATIENT
Start: 2018-05-31 | End: 2018-12-03 | Stop reason: SDUPTHER

## 2018-06-28 DIAGNOSIS — E03.9 HYPOTHYROIDISM, UNSPECIFIED TYPE: Primary | ICD-10-CM

## 2018-06-28 RX ORDER — LEVOTHYROXINE SODIUM 0.12 MG/1
TABLET ORAL
Qty: 30 TABLET | Refills: 5 | Status: SHIPPED | OUTPATIENT
Start: 2018-06-28 | End: 2019-01-28 | Stop reason: DRUGHIGH

## 2018-08-21 ENCOUNTER — TRANSCRIBE ORDERS (OUTPATIENT)
Dept: RADIOLOGY | Facility: CLINIC | Age: 63
End: 2018-08-21

## 2018-08-21 ENCOUNTER — OFFICE VISIT (OUTPATIENT)
Dept: FAMILY MEDICINE CLINIC | Facility: CLINIC | Age: 63
End: 2018-08-21
Payer: COMMERCIAL

## 2018-08-21 ENCOUNTER — APPOINTMENT (OUTPATIENT)
Dept: RADIOLOGY | Facility: CLINIC | Age: 63
End: 2018-08-21
Payer: COMMERCIAL

## 2018-08-21 VITALS
SYSTOLIC BLOOD PRESSURE: 110 MMHG | WEIGHT: 156 LBS | HEART RATE: 72 BPM | RESPIRATION RATE: 12 BRPM | BODY MASS INDEX: 25.96 KG/M2 | TEMPERATURE: 97.9 F | DIASTOLIC BLOOD PRESSURE: 70 MMHG

## 2018-08-21 DIAGNOSIS — F41.9 ANXIETY: ICD-10-CM

## 2018-08-21 DIAGNOSIS — M25.551 RIGHT HIP PAIN: ICD-10-CM

## 2018-08-21 DIAGNOSIS — M54.50 RIGHT-SIDED LOW BACK PAIN WITHOUT SCIATICA, UNSPECIFIED CHRONICITY: ICD-10-CM

## 2018-08-21 DIAGNOSIS — M54.50 RIGHT-SIDED LOW BACK PAIN WITHOUT SCIATICA, UNSPECIFIED CHRONICITY: Primary | ICD-10-CM

## 2018-08-21 PROCEDURE — 72100 X-RAY EXAM L-S SPINE 2/3 VWS: CPT

## 2018-08-21 PROCEDURE — 99213 OFFICE O/P EST LOW 20 MIN: CPT | Performed by: FAMILY MEDICINE

## 2018-08-21 PROCEDURE — 73502 X-RAY EXAM HIP UNI 2-3 VIEWS: CPT

## 2018-08-21 RX ORDER — LEVOTHYROXINE SODIUM 88 UG/1
88 TABLET ORAL DAILY
COMMUNITY
Start: 2018-07-05 | End: 2019-01-28 | Stop reason: ALTCHOICE

## 2018-08-21 NOTE — PROGRESS NOTES
Espinoza Sanchez 1955 female MRN: [id number]    FAMILY PRACTICE ACUTE OFFICE VISIT  [facility] - [address]      ASSESSMENT/PLAN  Espinoza Sanchez is a 58 y o  female presents to the office for    1) Back pain & Right hip pain  - Likely 2/2 to DJD, however will obtain and xray to r/o and acute fracture / pathology  - Continue supportive care till results return    2) Anxiety:  - Previously controlled on Ativan per Dr Lala Cho  - Patient requesting refill; only uses 1 time a month for uncontrolled symptoms  - Would like to see her bottle/ pharmacy to call and see her last refills given PDMP/ PMED didn't show medication    -encourage this patient to establish with her near by [facility]  She states that she is driving an hour to these appointments  I advised her that there is a new clinic that has been opened up in [facility] that she should attend and she agrees and was very thankful for the recommendation  Disposition: RTC in 2 weeks with new PCP Dr Oliver Mcconnell at [facility]    No future appointments  SUBJECTIVE  CC: Back Pain (Discuss low back/hip pain)      HPI:  Espinoza Sanchez is a 58 y o  female who presents to the office for an acute appointment  -Right hip pain:  3 weeks; dull achy located in the groin radiating to the back pelvis; new onset; likely aggravated by biking 3 weeks ago  Patient states has dormant right hip pain at baseline but however has gotten worse in the last 3 weeks  States that his gone from tremendous pain to "livable"  -Lower back pain:  Chronic worsening in the last 3 weeks  Patient states that she is unsure if it was the bike ride or just arthritis pain  Patient to cold and has worked herself up  She would like of x-ray to rule out any other worsening pathologies  - Anxiety:  Patient states that she was previously prescribed Ativan as needed and has used 1 bottle once a year    The patient states that she has no history of substance abuse  She states that she has not needed anything long term it is just acute situations like at her very anxious  Review of Systems   Constitutional: Negative for activity change, appetite change, chills, fatigue and fever  HENT: Negative for congestion  Eyes: Negative for visual disturbance  Respiratory: Negative for cough, chest tightness and shortness of breath  Cardiovascular: Negative for chest pain and leg swelling  Gastrointestinal: Negative for abdominal distention, abdominal pain, constipation, diarrhea, nausea and vomiting  Musculoskeletal: Positive for back pain and gait problem  Negative for arthralgias  JOINT PAIN OF THE RIGHT HIP   Skin: Negative for rash  Allergic/Immunologic: Negative for environmental allergies  Neurological: Negative for dizziness and headaches  Psychiatric/Behavioral: The patient is nervous/anxious  All other systems reviewed and are negative        Historical Information   The patient history was reviewed as follows:  Past Medical History:   Diagnosis Date    Disease of thyroid gland     Hypertension          Past Surgical History:   Procedure Laterality Date    BREAST LUMPECTOMY Left     Benign    THYROID SURGERY      Total Thyroidectomy     Family History   Problem Relation Age of Onset    Arthritis Mother     Lung cancer Mother    Vena Rosalinda Lung cancer Father     COPD Brother     Diabetes Maternal Grandmother         Mellitus    Diabetes Paternal Grandmother         Mellitus      Social History   History   Alcohol Use    Yes     Comment: Social     History   Drug Use No     History   Smoking Status    Former Smoker   Smokeless Tobacco    Never Used       Medications:     Current Outpatient Prescriptions:     atenolol (TENORMIN) 25 mg tablet, Take 1 tablet (25 mg total) by mouth daily At 8 PM, Disp: 90 tablet, Rfl: 1    escitalopram (LEXAPRO) 10 mg tablet, TAKE 1 TABLET EVERY DAY, Disp: 90 tablet, Rfl: 1   Omega-3 Fatty Acids (FISH OIL DOUBLE STRENGTH) 1200 MG CAPS, Take 1 capsule (1,200 mg total) by mouth 2 (two) times a day, Disp: 60 capsule, Rfl: 11    UNITHROID 88 MCG tablet, Take 88 mcg by mouth daily  , Disp: , Rfl:     levothyroxine 125 mcg tablet, TAKE 1 EVERY DAY (Patient not taking: Reported on 8/21/2018), Disp: 30 tablet, Rfl: 5    UNITHROID 100 MCG tablet, , Disp: , Rfl:     No Known Allergies    OBJECTIVE  Vitals:   Vitals:    08/21/18 1042   BP: 110/70   BP Location: Left arm   Patient Position: Sitting   Cuff Size: Standard   Pulse: 72   Resp: 12   Temp: 97 9 °F (36 6 °C)   TempSrc: Temporal   Weight: 70 8 kg (156 lb)         Physical Exam   Constitutional: She is oriented to person, place, and time  Vital signs are normal  She appears well-developed and well-nourished  HENT:   Head: Normocephalic and atraumatic  Right Ear: Hearing normal    Left Ear: Hearing normal    Eyes: Conjunctivae and EOM are normal  Pupils are equal, round, and reactive to light  Neck: Normal range of motion  Neck supple  Cardiovascular: Normal rate, regular rhythm, S1 normal, S2 normal, normal heart sounds and intact distal pulses  No murmur heard  Pulmonary/Chest: Effort normal and breath sounds normal  No respiratory distress  She has no wheezes  Abdominal: Soft  Bowel sounds are normal  She exhibits no distension  There is no tenderness  Musculoskeletal: She exhibits no edema  Right hip: She exhibits decreased range of motion and tenderness  She exhibits normal strength, no swelling, no crepitus, no deformity and no laceration  Left hip: Normal         Lumbar back: She exhibits tenderness  She exhibits normal range of motion, no swelling, no edema, no deformity, no pain and no spasm  Neurological: She is alert and oriented to person, place, and time  She has normal strength  Skin: Skin is warm  No rash noted  Psychiatric: She has a normal mood and affect   Her speech is normal and behavior is normal  Judgment and thought content normal    Vitals reviewed                   Kyle Daley MD,   [facility]  8/21/2018

## 2018-08-21 NOTE — PROGRESS NOTES
Dannielle Sifuentes 1955 female MRN: [id number]    FAMILY PRACTICE ACUTE OFFICE VISIT  [facility] - [address]      ASSESSMENT/PLAN  Dannielle Sifuentes is a 58 y o  female present     {Assess/PlanSmartLinks:[id number]}      Disposition:    No future appointments  SUBJECTIVE  CC: Back Pain (Discuss low back/hip pain)      HPI:  Dannielle Sifuentes is a 58 y o  female who presents for ***  Chronic low back pain +35 years,; injury hit a curb; use to go to [employer]   Intermittent    Growin pain 3 weeks ; intense  And not liveable   Groin to the back       Review of Systems    Historical Information   The patient history was reviewed as follows:  Past Medical History:   Diagnosis Date    Disease of thyroid gland     Hypertension          Past Surgical History:   Procedure Laterality Date    BREAST LUMPECTOMY Left     Benign    THYROID SURGERY      Total Thyroidectomy     Family History   Problem Relation Age of Onset    Arthritis Mother     Lung cancer Mother    [facility] Lung cancer Father     COPD Brother     Diabetes Maternal Grandmother         Mellitus    Diabetes Paternal Grandmother         Mellitus      Social History   History   Alcohol Use    Yes     Comment: Social     History   Drug Use No     History   Smoking Status    Former Smoker   Smokeless Tobacco    Never Used       Medications:     Current Outpatient Prescriptions:     atenolol (TENORMIN) 25 mg tablet, Take 1 tablet (25 mg total) by mouth daily At 8 PM, Disp: 90 tablet, Rfl: 1    escitalopram (LEXAPRO) 10 mg tablet, TAKE 1 TABLET EVERY DAY, Disp: 90 tablet, Rfl: 1    Omega-3 Fatty Acids (FISH OIL DOUBLE STRENGTH) 1200 MG CAPS, Take 1 capsule (1,200 mg total) by mouth 2 (two) times a day, Disp: 60 capsule, Rfl: 11    UNITHROID 88 MCG tablet, Take 88 mcg by mouth daily  , Disp: , Rfl:     levothyroxine 125 mcg tablet, TAKE 1 EVERY DAY (Patient not taking: Reported on 8/21/2018), Disp: 30 tablet, Rfl: 5   UNITHROID 100 MCG tablet, , Disp: , Rfl:     No Known Allergies    OBJECTIVE  Vitals:   Vitals:    08/21/18 1042   BP: 110/70   BP Location: Left arm   Patient Position: Sitting   Cuff Size: Standard   Pulse: 72   Resp: 12   Temp: 97 9 °F (36 6 °C)   TempSrc: Temporal   Weight: 70 8 kg (156 lb)         Physical Exam               Ariadna Hamilton MD,   [facility]  8/21/2018

## 2018-08-22 ENCOUNTER — TELEPHONE (OUTPATIENT)
Dept: FAMILY MEDICINE CLINIC | Facility: CLINIC | Age: 63
End: 2018-08-22

## 2018-08-22 DIAGNOSIS — F41.9 ANXIETY: Primary | ICD-10-CM

## 2018-08-22 RX ORDER — LORAZEPAM 0.5 MG/1
0.5 TABLET ORAL DAILY PRN
Qty: 30 TABLET | Refills: 0 | Status: SHIPPED | OUTPATIENT
Start: 2018-08-22 | End: 2019-06-06 | Stop reason: SDUPTHER

## 2018-08-22 NOTE — TELEPHONE ENCOUNTER
----- Message from Corrine Chery MD sent at 8/22/2018 12:57 PM EDT -----  Are we able to call the pharmacy at [phone] at the  [facility]  This patient insists that Dr Emliiano Corey  gave her Ativan on August 10, 2017    I would like to just confirmation that she has only received it from that Dr  and please tell them if we can get the exact prescription that was sent meaning the directions that were on the bottle

## 2018-08-22 NOTE — TELEPHONE ENCOUNTER
I CALLED AND THE PHARMACIST SAID PT IS CORRECT IT WAS LAST FILLED 8- BY ORIANA ADAME FOR  5MG 1 QAM PRN #30 AND HAS NOT BEEN FILLED SINCE BY HER OR ANY OTHER

## 2018-10-18 DIAGNOSIS — I10 ESSENTIAL HYPERTENSION: ICD-10-CM

## 2018-10-20 RX ORDER — ATENOLOL 25 MG/1
TABLET ORAL
Qty: 90 TABLET | Refills: 1 | Status: SHIPPED | OUTPATIENT
Start: 2018-10-20 | End: 2019-01-28 | Stop reason: SDUPTHER

## 2018-11-02 ENCOUNTER — OFFICE VISIT (OUTPATIENT)
Dept: URGENT CARE | Facility: CLINIC | Age: 63
End: 2018-11-02
Payer: COMMERCIAL

## 2018-11-02 VITALS
HEART RATE: 64 BPM | WEIGHT: 157 LBS | RESPIRATION RATE: 18 BRPM | BODY MASS INDEX: 26.16 KG/M2 | DIASTOLIC BLOOD PRESSURE: 84 MMHG | OXYGEN SATURATION: 96 % | HEIGHT: 65 IN | SYSTOLIC BLOOD PRESSURE: 120 MMHG | TEMPERATURE: 98.1 F

## 2018-11-02 DIAGNOSIS — J01.00 ACUTE MAXILLARY SINUSITIS, RECURRENCE NOT SPECIFIED: Primary | ICD-10-CM

## 2018-11-02 PROCEDURE — G0382 LEV 3 HOSP TYPE B ED VISIT: HCPCS | Performed by: FAMILY MEDICINE

## 2018-11-02 RX ORDER — AMOXICILLIN AND CLAVULANATE POTASSIUM 875; 125 MG/1; MG/1
1 TABLET, FILM COATED ORAL EVERY 12 HOURS SCHEDULED
Qty: 20 TABLET | Refills: 0 | Status: SHIPPED | OUTPATIENT
Start: 2018-11-02 | End: 2018-11-12

## 2018-11-02 RX ORDER — PREDNISONE 10 MG/1
TABLET ORAL
Qty: 21 TABLET | Refills: 0 | Status: SHIPPED | OUTPATIENT
Start: 2018-11-02 | End: 2019-01-28 | Stop reason: ALTCHOICE

## 2018-11-02 NOTE — PATIENT INSTRUCTIONS
Antibiotics and prednisone as directed  Increase fluid intake  Follow-up with PCP if symptoms not improving in 3-4 days

## 2018-11-02 NOTE — PROGRESS NOTES
[facility]        NAME: Saida Valerio is a 58 y o  female  : 1955    MRN: [id number]  DATE: 2018  TIME: 7:54 PM    Assessment and Plan   Acute maxillary sinusitis, recurrence not specified [J01 00]  1  Acute maxillary sinusitis, recurrence not specified  amoxicillin-clavulanate (AUGMENTIN) 875-125 mg per tablet    predniSONE 10 mg tablet         Patient Instructions   Patient Instructions   Antibiotics and prednisone as directed  Increase fluid intake  Follow-up with PCP if symptoms not improving in 3-4 days        Proceed to  ER if symptoms worsen  Chief Complaint     Chief Complaint   Patient presents with    Cold Like Symptoms     head cold, congestion 2 weeks         History of Present Illness       Patient presents with 2 week history of sinus congestion and pressure, frontal headache  No fever no chills no sore throat no chest congestion cough or wheezing or shortness of breath        Review of Systems   Review of Systems   Constitutional: Negative  HENT: Positive for congestion, sinus pain and sinus pressure  Negative for sore throat  Respiratory: Negative  Cardiovascular: Negative            Current Medications       Current Outpatient Prescriptions:     atenolol (TENORMIN) 25 mg tablet, Take 1 tablet (25 mg total) by mouth daily At 8 PM, Disp: 90 tablet, Rfl: 1    escitalopram (LEXAPRO) 10 mg tablet, TAKE 1 TABLET EVERY DAY, Disp: 90 tablet, Rfl: 1    LORazepam (ATIVAN) 0 5 mg tablet, Take 1 tablet (0 5 mg total) by mouth daily as needed for anxiety, Disp: 30 tablet, Rfl: 0    UNITHROID 88 MCG tablet, Take 88 mcg by mouth daily  , Disp: , Rfl:     amoxicillin-clavulanate (AUGMENTIN) 875-125 mg per tablet, Take 1 tablet by mouth every 12 (twelve) hours for 10 days, Disp: 20 tablet, Rfl: 0    atenolol (TENORMIN) 25 mg tablet, TAKE 1 TABLET BY MOUTH ONCE A DAY AT 8PM (Patient not taking: Reported on 2018), Disp: 90 tablet, Rfl: 1    levothyroxine 125 mcg tablet, TAKE 1 EVERY DAY (Patient not taking: Reported on 8/21/2018), Disp: 30 tablet, Rfl: 5    Omega-3 Fatty Acids (FISH OIL DOUBLE STRENGTH) 1200 MG CAPS, Take 1 capsule (1,200 mg total) by mouth 2 (two) times a day (Patient not taking: Reported on 11/2/2018 ), Disp: 60 capsule, Rfl: 11    predniSONE 10 mg tablet, Take 6 tablets today, 5 tomorrow, 4 the next day, 3 the next day, 2 the following and 1 the last day with food, Disp: 21 tablet, Rfl: 0    UNITHROID 100 MCG tablet, , Disp: , Rfl:     Current Allergies     Allergies as of 11/02/2018    (No Known Allergies)            The following portions of the patient's history were reviewed and updated as appropriate: allergies, current medications, past family history, past medical history, past social history, past surgical history and problem list      Past Medical History:   Diagnosis Date    Disease of thyroid gland     Hypertension        Past Surgical History:   Procedure Laterality Date    BREAST LUMPECTOMY Left     Benign    THYROID SURGERY      Total Thyroidectomy       Family History   Problem Relation Age of Onset    Arthritis Mother     Lung cancer Mother     Lung cancer Father     COPD Brother     Diabetes Maternal Grandmother         Mellitus    Diabetes Paternal Grandmother         Mellitus         Medications have been verified  Objective   /84   Pulse 64   Temp 98 1 °F (36 7 °C)   Resp 18   Ht 5' 5" (1 651 m)   Wt 71 2 kg (157 lb)   SpO2 96%   BMI 26 13 kg/m²        Physical Exam     Physical Exam   Constitutional: She appears well-developed and well-nourished  No distress  HENT:   Right Ear: External ear normal    Left Ear: External ear normal    Mouth/Throat: Oropharynx is clear and moist  No oropharyngeal exudate  Intranasal mucosal erythema, edema and mucopurulent rhinorrhea, PND   Eyes: Conjunctivae are normal    Neck: Neck supple  Cardiovascular: Normal rate, regular rhythm and normal heart sounds  No murmur heard  Pulmonary/Chest: Effort normal and breath sounds normal    Lymphadenopathy:     She has no cervical adenopathy

## 2018-12-03 DIAGNOSIS — F41.9 ANXIETY: ICD-10-CM

## 2018-12-04 RX ORDER — ESCITALOPRAM OXALATE 10 MG/1
TABLET ORAL
Qty: 90 TABLET | Refills: 1 | Status: SHIPPED | OUTPATIENT
Start: 2018-12-04 | End: 2019-05-25 | Stop reason: SDUPTHER

## 2019-01-28 ENCOUNTER — OFFICE VISIT (OUTPATIENT)
Dept: FAMILY MEDICINE CLINIC | Facility: CLINIC | Age: 64
End: 2019-01-28
Payer: COMMERCIAL

## 2019-01-28 VITALS
DIASTOLIC BLOOD PRESSURE: 80 MMHG | HEIGHT: 65 IN | HEART RATE: 64 BPM | WEIGHT: 160.2 LBS | BODY MASS INDEX: 26.69 KG/M2 | TEMPERATURE: 97.6 F | SYSTOLIC BLOOD PRESSURE: 120 MMHG

## 2019-01-28 DIAGNOSIS — F41.9 ANXIETY: ICD-10-CM

## 2019-01-28 DIAGNOSIS — R53.82 CHRONIC FATIGUE: ICD-10-CM

## 2019-01-28 DIAGNOSIS — I10 BENIGN ESSENTIAL HYPERTENSION: Primary | ICD-10-CM

## 2019-01-28 DIAGNOSIS — E89.0 POSTPROCEDURAL HYPOTHYROIDISM: ICD-10-CM

## 2019-01-28 DIAGNOSIS — Z23 NEED FOR INFLUENZA VACCINATION: ICD-10-CM

## 2019-01-28 DIAGNOSIS — Z12.11 ENCOUNTER FOR SCREENING COLONOSCOPY: ICD-10-CM

## 2019-01-28 DIAGNOSIS — G47.9 SLEEP DISORDER: ICD-10-CM

## 2019-01-28 DIAGNOSIS — R79.89 ELEVATED LFTS: ICD-10-CM

## 2019-01-28 DIAGNOSIS — E55.9 VITAMIN D DEFICIENCY: ICD-10-CM

## 2019-01-28 PROCEDURE — 3079F DIAST BP 80-89 MM HG: CPT | Performed by: FAMILY MEDICINE

## 2019-01-28 PROCEDURE — 99214 OFFICE O/P EST MOD 30 MIN: CPT | Performed by: FAMILY MEDICINE

## 2019-01-28 PROCEDURE — 3008F BODY MASS INDEX DOCD: CPT | Performed by: FAMILY MEDICINE

## 2019-01-28 PROCEDURE — 3074F SYST BP LT 130 MM HG: CPT | Performed by: FAMILY MEDICINE

## 2019-01-28 RX ORDER — LEVOTHYROXINE SODIUM 88 UG/1
88 TABLET ORAL DAILY
COMMUNITY
End: 2019-05-31

## 2019-01-28 NOTE — ASSESSMENT & PLAN NOTE
Patient with history of multiple thyroid nodules s/p complete thyroidectomy  Currently on levothyroxine 88 mcg daily

## 2019-01-28 NOTE — ASSESSMENT & PLAN NOTE
Doing well after her [facility] last year  Take Lexapro daily and lorazepam as needed  Patient states she has not had refill of her prn since August and still has some medication left  She does not use daily     Will continue to monitor closely

## 2019-01-28 NOTE — ASSESSMENT & PLAN NOTE
History of elevated lfts  Will check lab work and add hep studies on as well  If still elevated will evaluate with u/s

## 2019-01-28 NOTE — PROGRESS NOTES
Gary Su 1955 female MRN: [id number]    Family Medicine Follow-up Visit    ASSESSMENT/PLAN  Problem List Items Addressed This Visit        Endocrine    Postprocedural hypothyroidism     Patient with history of multiple thyroid nodules s/p complete thyroidectomy  Currently on levothyroxine 88 mcg daily          Relevant Medications    levothyroxine 88 mcg tablet    Other Relevant Orders    TSH, 3rd generation       Cardiovascular and Mediastinum    Benign essential hypertension - Primary     BP well controlled on medication  She has been on BB for many years and has been very stable so will continue with currently therapy as initiated by her prior PCP         Relevant Orders    Comprehensive metabolic panel    TSH, 3rd generation       Other    Anxiety     Doing well after her intermediate last year  Take Lexapro daily and lorazepam as needed  Patient states she has not had refill of her prn since August and still has some medication left  She does not use daily     Will continue to monitor closely         Chronic fatigue     No  Obvious etiology  Will check lab work  Offered sleep medicine referral which patient declined at this time, will encourage sleep study for dx of daytime fatigue should symptoms persist          Relevant Orders    Comprehensive metabolic panel    CBC and differential    TSH, 3rd generation    Encounter for screening colonoscopy    Relevant Orders    Ambulatory referral to Gastroenterology    Sleep disorder     Continue to monitor  Referral to sleep medicine offered, patient will wait at this time          Elevated LFTs     History of elevated lfts  Will check lab work and add hep studies on as well  If still elevated will evaluate with u/s          Relevant Orders    Comprehensive metabolic panel    Hepatitis C antibody    Hepatitis B surface antigen    Hepatitis B surface antibody      Other Visit Diagnoses     Vitamin D deficiency        Relevant Orders    Vitamin D 1,25 dihydroxy Need for influenza vaccination        Relevant Orders    PREFERRED: influenza vaccine, [id number], quadrivalent, recombinant, PF, 0 5 mL, for patients 18 yr+ (FLUBLOK)          Follows with OBGYN for well women exams-[facility]- will request records     No future appointments  SUBJECTIVE  CC: Establish Care (Check up Hypertension/Hyperlipidemia)      HPI:  Gary Su is a 61 y o  female who presents to establish care  HTN: takes atenolol and has been on this for years and is doing well  Anxiety: on lexapro daily and ativan as needed she is not taking daily only as needed   Thyroid nodules: had whole thyroid taken out, is now on levothyroxine 88 mcg daily  Follows with Endocrinologist in [place]  Fatigue: states she is feeling very fatigued  Retired last year, no new stresses  Itching: states over the last few months, javier in her arms  States it use to be only in the summer but now it is not going away  Using OTC cerve cream for itching  Dr Iftikhar Akins- had Ob/gyn visit in 2018  Need colono- last one over 10 years ago was normal        HPI    Review of Systems   Constitutional: Positive for fatigue  Negative for chills and fever  HENT: Negative for congestion, postnasal drip, rhinorrhea and sinus pressure  Eyes: Negative for photophobia and visual disturbance  Respiratory: Negative for cough and shortness of breath  Cardiovascular: Negative for chest pain, palpitations and leg swelling  Gastrointestinal: Negative for abdominal pain, constipation, diarrhea, nausea and vomiting  Genitourinary: Negative for difficulty urinating and dysuria  Musculoskeletal: Negative for arthralgias and myalgias  Skin: Negative for color change and rash  Allergic/Immunologic: Positive for environmental allergies  Neurological: Negative for dizziness, weakness, light-headedness and headaches         Historical Information   The patient history was reviewed as follows:    Past Medical History:   Diagnosis Date    Disease of thyroid gland     Hypertension      Past Surgical History:   Procedure Laterality Date    BREAST LUMPECTOMY Left     Benign    THYROID SURGERY      Total Thyroidectomy     Family History   Problem Relation Age of Onset    Arthritis Mother     Lung cancer Mother    Ardeth Needs Lung cancer Father     COPD Brother     Diabetes Maternal Grandmother         Mellitus    Diabetes Paternal Grandmother         Mellitus      Social History   History   Alcohol Use    Yes     Comment: Social     History   Drug Use No     History   Smoking Status    Former Smoker    Types: Cigarettes   Smokeless Tobacco    Never Used       Medications:     Current Outpatient Prescriptions:     atenolol (TENORMIN) 25 mg tablet, Take 1 tablet (25 mg total) by mouth daily At 8 PM, Disp: 90 tablet, Rfl: 1    escitalopram (LEXAPRO) 10 mg tablet, TAKE 1 TABLET EVERY DAY, Disp: 90 tablet, Rfl: 1    levothyroxine 88 mcg tablet, Take 88 mcg by mouth daily, Disp: , Rfl:     LORazepam (ATIVAN) 0 5 mg tablet, Take 1 tablet (0 5 mg total) by mouth daily as needed for anxiety, Disp: 30 tablet, Rfl: 0    Omega-3 Fatty Acids (FISH OIL DOUBLE STRENGTH) 1200 MG CAPS, Take 1 capsule (1,200 mg total) by mouth 2 (two) times a day (Patient not taking: Reported on 11/2/2018 ), Disp: 60 capsule, Rfl: 11  No Known Allergies    OBJECTIVE    Vitals:   Vitals:    01/28/19 1450   BP: 120/80   BP Location: Right arm   Patient Position: Sitting   Cuff Size: Standard   Pulse: 64   Temp: 97 6 °F (36 4 °C)   TempSrc: Tympanic   Weight: 72 7 kg (160 lb 3 2 oz)   Height: 5' 5" (1 651 m)           Physical Exam   Constitutional: She is oriented to person, place, and time  She appears well-developed and well-nourished  HENT:   Head: Normocephalic and atraumatic  Mouth/Throat: Oropharynx is clear and moist    Eyes: Pupils are equal, round, and reactive to light  Neck: Normal range of motion  Neck supple     Cardiovascular: Normal rate, regular rhythm and normal heart sounds  Pulmonary/Chest: Effort normal and breath sounds normal  No respiratory distress  She has no wheezes  Abdominal: Soft  Bowel sounds are normal  She exhibits no distension  There is no tenderness  Musculoskeletal: Normal range of motion  She exhibits no edema or tenderness  Neurological: She is alert and oriented to person, place, and time  Skin: Skin is warm and dry  Psychiatric: She has a normal mood and affect   Her behavior is normal             Ayesha Jones DO    1/28/2019

## 2019-01-28 NOTE — ASSESSMENT & PLAN NOTE
No  Obvious etiology  Will check lab work  Offered sleep medicine referral which patient declined at this time, will encourage sleep study for dx of daytime fatigue should symptoms persist

## 2019-01-28 NOTE — ASSESSMENT & PLAN NOTE
BP well controlled on medication  She has been on BB for many years and has been very stable so will continue with currently therapy as initiated by her prior PCP

## 2019-02-04 LAB
1,25(OH)2D3 SERPL-MCNC: 55 PG/ML (ref 19.9–79.3)
ALBUMIN SERPL-MCNC: 4.6 G/DL (ref 3.6–4.8)
ALBUMIN/GLOB SERPL: 1.8 {RATIO} (ref 1.2–2.2)
ALP SERPL-CCNC: 68 IU/L (ref 39–117)
ALT SERPL-CCNC: 44 IU/L (ref 0–32)
AST SERPL-CCNC: 25 IU/L (ref 0–40)
BASOPHILS # BLD AUTO: 0.1 X10E3/UL (ref 0–0.2)
BASOPHILS NFR BLD AUTO: 1 %
BILIRUB SERPL-MCNC: 0.5 MG/DL (ref 0–1.2)
BUN SERPL-MCNC: 17 MG/DL (ref 8–27)
BUN/CREAT SERPL: 23 (ref 12–28)
CALCIUM SERPL-MCNC: 10 MG/DL (ref 8.7–10.3)
CHLORIDE SERPL-SCNC: 100 MMOL/L (ref 96–106)
CO2 SERPL-SCNC: 24 MMOL/L (ref 20–29)
CREAT SERPL-MCNC: 0.74 MG/DL (ref 0.57–1)
EOSINOPHIL # BLD AUTO: 0.1 X10E3/UL (ref 0–0.4)
EOSINOPHIL NFR BLD AUTO: 1 %
ERYTHROCYTE [DISTWIDTH] IN BLOOD BY AUTOMATED COUNT: 13.4 % (ref 12.3–15.4)
GLOBULIN SER-MCNC: 2.5 G/DL (ref 1.5–4.5)
GLUCOSE SERPL-MCNC: 91 MG/DL (ref 65–99)
HBV SURFACE AB SER-ACNC: <3.1 MIU/ML
HBV SURFACE AG SERPL QL IA: NEGATIVE
HCT VFR BLD AUTO: 40 % (ref 34–46.6)
HCV AB S/CO SERPL IA: 0.1 S/CO RATIO (ref 0–0.9)
HGB BLD-MCNC: 14 G/DL (ref 11.1–15.9)
IMM GRANULOCYTES # BLD: 0 X10E3/UL (ref 0–0.1)
IMM GRANULOCYTES NFR BLD: 0 %
LYMPHOCYTES # BLD AUTO: 2.2 X10E3/UL (ref 0.7–3.1)
LYMPHOCYTES NFR BLD AUTO: 38 %
MCH RBC QN AUTO: 31.2 PG (ref 26.6–33)
MCHC RBC AUTO-ENTMCNC: 35 G/DL (ref 31.5–35.7)
MCV RBC AUTO: 89 FL (ref 79–97)
MONOCYTES # BLD AUTO: 0.3 X10E3/UL (ref 0.1–0.9)
MONOCYTES NFR BLD AUTO: 5 %
NEUTROPHILS # BLD AUTO: 3.2 X10E3/UL (ref 1.4–7)
NEUTROPHILS NFR BLD AUTO: 55 %
PLATELET # BLD AUTO: 273 X10E3/UL (ref 150–379)
POTASSIUM SERPL-SCNC: 4.1 MMOL/L (ref 3.5–5.2)
PROT SERPL-MCNC: 7.1 G/DL (ref 6–8.5)
RBC # BLD AUTO: 4.49 X10E6/UL (ref 3.77–5.28)
SL AMB EGFR AFRICAN AMERICAN: 100 ML/MIN/1.73
SL AMB EGFR NON AFRICAN AMERICAN: 86 ML/MIN/1.73
SODIUM SERPL-SCNC: 140 MMOL/L (ref 134–144)
TSH SERPL DL<=0.005 MIU/L-ACNC: 1.29 UIU/ML (ref 0.45–4.5)
WBC # BLD AUTO: 5.8 X10E3/UL (ref 3.4–10.8)

## 2019-05-25 DIAGNOSIS — F41.9 ANXIETY: ICD-10-CM

## 2019-05-26 RX ORDER — ESCITALOPRAM OXALATE 10 MG/1
TABLET ORAL
Qty: 90 TABLET | Refills: 1 | Status: SHIPPED | OUTPATIENT
Start: 2019-05-26 | End: 2019-11-25 | Stop reason: SDUPTHER

## 2019-05-31 ENCOUNTER — APPOINTMENT (OUTPATIENT)
Dept: RADIOLOGY | Facility: CLINIC | Age: 64
End: 2019-05-31
Payer: COMMERCIAL

## 2019-05-31 ENCOUNTER — OFFICE VISIT (OUTPATIENT)
Dept: FAMILY MEDICINE CLINIC | Facility: CLINIC | Age: 64
End: 2019-05-31
Payer: COMMERCIAL

## 2019-05-31 VITALS
WEIGHT: 154 LBS | OXYGEN SATURATION: 98 % | RESPIRATION RATE: 15 BRPM | BODY MASS INDEX: 25.66 KG/M2 | SYSTOLIC BLOOD PRESSURE: 128 MMHG | HEART RATE: 64 BPM | DIASTOLIC BLOOD PRESSURE: 80 MMHG | HEIGHT: 65 IN | TEMPERATURE: 97.2 F

## 2019-05-31 DIAGNOSIS — M25.561 ACUTE PAIN OF BOTH KNEES: ICD-10-CM

## 2019-05-31 DIAGNOSIS — M79.604 PAIN IN BOTH LOWER EXTREMITIES: Primary | ICD-10-CM

## 2019-05-31 DIAGNOSIS — M79.605 PAIN IN BOTH LOWER EXTREMITIES: Primary | ICD-10-CM

## 2019-05-31 DIAGNOSIS — F41.9 ANXIETY: ICD-10-CM

## 2019-05-31 DIAGNOSIS — M25.562 ACUTE PAIN OF BOTH KNEES: ICD-10-CM

## 2019-05-31 PROCEDURE — 99214 OFFICE O/P EST MOD 30 MIN: CPT | Performed by: FAMILY MEDICINE

## 2019-05-31 PROCEDURE — 73562 X-RAY EXAM OF KNEE 3: CPT

## 2019-05-31 RX ORDER — THYROID,PORK 90 MG
TABLET ORAL
COMMUNITY
Start: 2019-05-09 | End: 2020-06-11 | Stop reason: DRUGHIGH

## 2019-06-06 ENCOUNTER — HOSPITAL ENCOUNTER (OUTPATIENT)
Dept: NON INVASIVE DIAGNOSTICS | Facility: CLINIC | Age: 64
Discharge: HOME | End: 2019-06-06
Payer: COMMERCIAL

## 2019-06-06 DIAGNOSIS — M79.605 PAIN IN BOTH LOWER EXTREMITIES: ICD-10-CM

## 2019-06-06 DIAGNOSIS — F41.9 ANXIETY: ICD-10-CM

## 2019-06-06 DIAGNOSIS — M79.604 PAIN IN BOTH LOWER EXTREMITIES: ICD-10-CM

## 2019-06-06 PROCEDURE — 93970 EXTREMITY STUDY: CPT | Performed by: SURGERY

## 2019-06-06 PROCEDURE — 93970 EXTREMITY STUDY: CPT

## 2019-06-06 RX ORDER — LORAZEPAM 0.5 MG/1
0.5 TABLET ORAL DAILY PRN
Qty: 30 TABLET | Refills: 0 | Status: SHIPPED | OUTPATIENT
Start: 2019-06-06 | End: 2020-03-05 | Stop reason: SDUPTHER

## 2019-06-17 ENCOUNTER — OFFICE VISIT (OUTPATIENT)
Dept: FAMILY MEDICINE CLINIC | Facility: CLINIC | Age: 64
End: 2019-06-17
Payer: COMMERCIAL

## 2019-06-17 VITALS
DIASTOLIC BLOOD PRESSURE: 84 MMHG | OXYGEN SATURATION: 95 % | HEART RATE: 57 BPM | HEIGHT: 65 IN | WEIGHT: 155 LBS | TEMPERATURE: 97.6 F | SYSTOLIC BLOOD PRESSURE: 130 MMHG | BODY MASS INDEX: 25.83 KG/M2

## 2019-06-17 DIAGNOSIS — E78.2 MIXED HYPERLIPIDEMIA: ICD-10-CM

## 2019-06-17 DIAGNOSIS — G89.29 CHRONIC BILATERAL LOW BACK PAIN WITH BILATERAL SCIATICA: ICD-10-CM

## 2019-06-17 DIAGNOSIS — M79.605 PAIN IN BOTH LOWER EXTREMITIES: ICD-10-CM

## 2019-06-17 DIAGNOSIS — I10 BENIGN ESSENTIAL HYPERTENSION: ICD-10-CM

## 2019-06-17 DIAGNOSIS — R53.82 CHRONIC FATIGUE: ICD-10-CM

## 2019-06-17 DIAGNOSIS — M25.561 ACUTE PAIN OF BOTH KNEES: Primary | ICD-10-CM

## 2019-06-17 DIAGNOSIS — E89.0 POSTPROCEDURAL HYPOTHYROIDISM: ICD-10-CM

## 2019-06-17 DIAGNOSIS — M79.604 PAIN IN BOTH LOWER EXTREMITIES: ICD-10-CM

## 2019-06-17 DIAGNOSIS — M25.562 ACUTE PAIN OF BOTH KNEES: Primary | ICD-10-CM

## 2019-06-17 DIAGNOSIS — M35.3 POLYMYALGIA (HCC): ICD-10-CM

## 2019-06-17 DIAGNOSIS — M54.42 CHRONIC BILATERAL LOW BACK PAIN WITH BILATERAL SCIATICA: ICD-10-CM

## 2019-06-17 DIAGNOSIS — M54.41 CHRONIC BILATERAL LOW BACK PAIN WITH BILATERAL SCIATICA: ICD-10-CM

## 2019-06-17 PROCEDURE — 3075F SYST BP GE 130 - 139MM HG: CPT | Performed by: FAMILY MEDICINE

## 2019-06-17 PROCEDURE — 99214 OFFICE O/P EST MOD 30 MIN: CPT | Performed by: FAMILY MEDICINE

## 2019-06-17 PROCEDURE — 3079F DIAST BP 80-89 MM HG: CPT | Performed by: FAMILY MEDICINE

## 2019-06-21 LAB
ALBUMIN SERPL-MCNC: 4.8 G/DL (ref 3.6–4.8)
ALBUMIN/GLOB SERPL: 2.1 {RATIO} (ref 1.2–2.2)
ALP SERPL-CCNC: 73 IU/L (ref 39–117)
ALT SERPL-CCNC: 74 IU/L (ref 0–32)
ANA SER QL: NEGATIVE
AST SERPL-CCNC: 36 IU/L (ref 0–40)
B BURGDOR IGG+IGM SER-ACNC: <0.91 ISR (ref 0–0.9)
BASOPHILS # BLD AUTO: 0 X10E3/UL (ref 0–0.2)
BASOPHILS NFR BLD AUTO: 1 %
BILIRUB SERPL-MCNC: 0.4 MG/DL (ref 0–1.2)
BUN SERPL-MCNC: 13 MG/DL (ref 8–27)
BUN/CREAT SERPL: 19 (ref 12–28)
CALCIUM SERPL-MCNC: 9.9 MG/DL (ref 8.7–10.3)
CCP IGA+IGG SERPL IA-ACNC: 3 UNITS (ref 0–19)
CHLORIDE SERPL-SCNC: 100 MMOL/L (ref 96–106)
CHOLEST SERPL-MCNC: 238 MG/DL (ref 100–199)
CHOLEST/HDLC SERPL: 4.7 RATIO (ref 0–4.4)
CO2 SERPL-SCNC: 24 MMOL/L (ref 20–29)
CREAT SERPL-MCNC: 0.7 MG/DL (ref 0.57–1)
CRP SERPL-MCNC: 2 MG/L (ref 0–10)
EOSINOPHIL # BLD AUTO: 0.2 X10E3/UL (ref 0–0.4)
EOSINOPHIL NFR BLD AUTO: 3 %
ERYTHROCYTE [DISTWIDTH] IN BLOOD BY AUTOMATED COUNT: 13.3 % (ref 12.3–15.4)
ERYTHROCYTE [SEDIMENTATION RATE] IN BLOOD BY WESTERGREN METHOD: 3 MM/HR (ref 0–40)
FERRITIN SERPL-MCNC: 141 NG/ML (ref 15–150)
GLOBULIN SER-MCNC: 2.3 G/DL (ref 1.5–4.5)
GLUCOSE SERPL-MCNC: 100 MG/DL (ref 65–99)
HCT VFR BLD AUTO: 42 % (ref 34–46.6)
HDLC SERPL-MCNC: 51 MG/DL
HGB BLD-MCNC: 14.3 G/DL (ref 11.1–15.9)
IMM GRANULOCYTES # BLD: 0 X10E3/UL (ref 0–0.1)
IMM GRANULOCYTES NFR BLD: 0 %
LDLC SERPL CALC-MCNC: 141 MG/DL (ref 0–99)
LYMPHOCYTES # BLD AUTO: 1.9 X10E3/UL (ref 0.7–3.1)
LYMPHOCYTES NFR BLD AUTO: 37 %
MCH RBC QN AUTO: 30.6 PG (ref 26.6–33)
MCHC RBC AUTO-ENTMCNC: 34 G/DL (ref 31.5–35.7)
MCV RBC AUTO: 90 FL (ref 79–97)
MONOCYTES # BLD AUTO: 0.4 X10E3/UL (ref 0.1–0.9)
MONOCYTES NFR BLD AUTO: 8 %
NEUTROPHILS # BLD AUTO: 2.6 X10E3/UL (ref 1.4–7)
NEUTROPHILS NFR BLD AUTO: 51 %
PLATELET # BLD AUTO: 283 X10E3/UL (ref 150–450)
POTASSIUM SERPL-SCNC: 4.6 MMOL/L (ref 3.5–5.2)
PROT SERPL-MCNC: 7.1 G/DL (ref 6–8.5)
RBC # BLD AUTO: 4.68 X10E6/UL (ref 3.77–5.28)
RHEUMATOID FACT SERPL-ACNC: 11 IU/ML (ref 0–13.9)
SL AMB EGFR AFRICAN AMERICAN: 107 ML/MIN/1.73
SL AMB EGFR NON AFRICAN AMERICAN: 93 ML/MIN/1.73
SL AMB VLDL CHOLESTEROL CALC: 46 MG/DL (ref 5–40)
SODIUM SERPL-SCNC: 140 MMOL/L (ref 134–144)
TRIGL SERPL-MCNC: 229 MG/DL (ref 0–149)
WBC # BLD AUTO: 5.1 X10E3/UL (ref 3.4–10.8)

## 2019-06-25 ENCOUNTER — OFFICE VISIT (OUTPATIENT)
Dept: OBGYN CLINIC | Facility: CLINIC | Age: 64
End: 2019-06-25
Payer: COMMERCIAL

## 2019-06-25 VITALS
HEIGHT: 65 IN | DIASTOLIC BLOOD PRESSURE: 68 MMHG | WEIGHT: 155 LBS | BODY MASS INDEX: 25.83 KG/M2 | SYSTOLIC BLOOD PRESSURE: 122 MMHG

## 2019-06-25 DIAGNOSIS — M17.0 PRIMARY OSTEOARTHRITIS OF BOTH KNEES: ICD-10-CM

## 2019-06-25 PROCEDURE — 99203 OFFICE O/P NEW LOW 30 MIN: CPT | Performed by: ORTHOPAEDIC SURGERY

## 2019-07-07 DIAGNOSIS — I10 ESSENTIAL HYPERTENSION: ICD-10-CM

## 2019-07-07 DIAGNOSIS — I10 BENIGN ESSENTIAL HYPERTENSION: ICD-10-CM

## 2019-07-08 RX ORDER — ATENOLOL 25 MG/1
TABLET ORAL
Qty: 90 TABLET | Refills: 1 | OUTPATIENT
Start: 2019-07-08

## 2019-08-28 ENCOUNTER — OFFICE VISIT (OUTPATIENT)
Dept: URGENT CARE | Facility: CLINIC | Age: 64
End: 2019-08-28
Payer: COMMERCIAL

## 2019-08-28 VITALS
BODY MASS INDEX: 25.72 KG/M2 | HEIGHT: 65 IN | DIASTOLIC BLOOD PRESSURE: 90 MMHG | SYSTOLIC BLOOD PRESSURE: 130 MMHG | TEMPERATURE: 98.1 F | WEIGHT: 154.4 LBS | HEART RATE: 88 BPM | OXYGEN SATURATION: 98 % | RESPIRATION RATE: 16 BRPM

## 2019-08-28 DIAGNOSIS — J01.10 ACUTE FRONTAL SINUSITIS, RECURRENCE NOT SPECIFIED: Primary | ICD-10-CM

## 2019-08-28 DIAGNOSIS — J02.9 SORE THROAT: ICD-10-CM

## 2019-08-28 LAB — S PYO AG THROAT QL: NEGATIVE

## 2019-08-28 PROCEDURE — G0382 LEV 3 HOSP TYPE B ED VISIT: HCPCS | Performed by: PHYSICIAN ASSISTANT

## 2019-08-28 PROCEDURE — 87880 STREP A ASSAY W/OPTIC: CPT | Performed by: PHYSICIAN ASSISTANT

## 2019-08-28 RX ORDER — PREDNISONE 10 MG/1
TABLET ORAL
Qty: 21 TABLET | Refills: 0 | Status: SHIPPED | OUTPATIENT
Start: 2019-08-28 | End: 2020-06-11 | Stop reason: ALTCHOICE

## 2019-08-28 NOTE — PATIENT INSTRUCTIONS
Take prednisone as directed  flonase daily  Antihistamine like allegra or zyrtec  If no improvement in 3-4 days f/u with PCP   If anything changes or worsens f/u sooner

## 2019-08-28 NOTE — PROGRESS NOTES
NAME: Oriana Lindsey is a 61 y o  female  : 1955    MRN: [id number]      Assessment and Plan   Acute frontal sinusitis, recurrence not specified [J01 10]  1  Acute frontal sinusitis, recurrence not specified  predniSONE 10 mg tablet   2  Sore throat  POCT rapid strepA    predniSONE 10 mg tablet       Strep negative-symptoms are more consistent with an upper respiratory infection and sinusitis  Patient Instructions   Patient Instructions   Take prednisone as directed  flonase daily  Antihistamine like allegra or zyrtec  If no improvement in 3-4 days f/u with PCP   If anything changes or worsens f/u sooner     Proceed to ER if symptoms worsen  Chief Complaint     Chief Complaint   Patient presents with    Cough     and sore throat x 1 day         History of Present Illness   Patient with past medical history of hypothyroidism and anxiety presents complaining of cough and congestion x1 day  She states yesterday she started with a sore throat and congestion and reports that her symptoms have worsened today  She also reports swollen glands and sore glands, cough, sinus pain and pressure and worsening sore throat  She reports an upset stomach as well and postnasal drip  Denies any chills but reports subjective fever last night but did not measure her temperature  She denies chest pain, chest tightness, palpitations, shortness of breath or dyspnea  States she tried taking DayQuil, NyQuil and Aleve with no improvement  Reports that her brother recently passed away and the  is tomorrow  Review of Systems   Review of Systems   Constitutional: Positive for fever (Subjective)  Negative for chills  HENT: Positive for congestion, postnasal drip, rhinorrhea, sinus pressure, sinus pain and sore throat  Negative for ear pain  Respiratory: Positive for cough  Negative for chest tightness, shortness of breath, wheezing and stridor  Cardiovascular: Negative for chest pain and palpitations  Current Medications       Current Outpatient Medications:     [place]OUR THYROID 90 MG tablet, Take once daily, Disp: , Rfl:     atenolol (TENORMIN) 25 mg tablet, Take 1 tablet (25 mg total) by mouth daily At 8 PM, Disp: 90 tablet, Rfl: 1    escitalopram (LEXAPRO) 10 mg tablet, TAKE 1 TABLET BY MOUTH EVERY DAY, Disp: 90 tablet, Rfl: 1    LORazepam (ATIVAN) 0 5 mg tablet, Take 1 tablet (0 5 mg total) by mouth daily as needed for anxiety, Disp: 30 tablet, Rfl: 0    predniSONE 10 mg tablet, Take 6 tablets today, 5 tablets tomorrow, 4 the next day, 3 the next day, 2 the following and 1 the last day- all with food, Disp: 21 tablet, Rfl: 0    Current Allergies     Allergies as of 08/28/2019    (No Known Allergies)              Past Medical History:   Diagnosis Date    Disease of thyroid gland     Hypertension        Past Surgical History:   Procedure Laterality Date    BREAST LUMPECTOMY Left     Benign    THYROID SURGERY      Total Thyroidectomy       Family History   Problem Relation Age of Onset    Arthritis Mother     Lung cancer Mother     Lung cancer Father     COPD Brother     Diabetes Maternal Grandmother         Mellitus    Diabetes Paternal Grandmother         Mellitus         Medications have been verified  The following portions of the patient's history were reviewed and updated as appropriate: allergies, current medications, past family history, past medical history, past social history, past surgical history and problem list     Objective   /90   Pulse 88   Temp 98 1 °F (36 7 °C) (Tympanic)   Resp 16   Ht 5' 5" (1 651 m)   Wt 70 kg (154 lb 6 4 oz)   SpO2 98%   BMI 25 69 kg/m²      Physical Exam     Physical Exam   Constitutional: She appears well-developed and well-nourished  No distress  HENT:   TMs clear bilaterally without erythema or bulging  Clear fluid behind both  Nasal mucosa is erythematous and edematous with clear rhinorrhea    Posterior oropharynx with cobblestoning, also with erythema, without edema, tonsillar hypertrophy or exudates  +clear PND   Cardiovascular: Normal rate, regular rhythm and normal heart sounds  Pulmonary/Chest: Effort normal and breath sounds normal  No stridor  No respiratory distress  She has no wheezes  She has no rales  Lymphadenopathy:     She has no cervical adenopathy  Skin: She is not diaphoretic  Vitals reviewed

## 2019-09-11 DIAGNOSIS — I10 BENIGN ESSENTIAL HYPERTENSION: ICD-10-CM

## 2019-09-11 DIAGNOSIS — I10 ESSENTIAL HYPERTENSION: ICD-10-CM

## 2019-09-11 RX ORDER — ATENOLOL 25 MG/1
TABLET ORAL
Qty: 90 TABLET | Refills: 1 | OUTPATIENT
Start: 2019-09-11

## 2019-09-12 DIAGNOSIS — I10 ESSENTIAL HYPERTENSION: ICD-10-CM

## 2019-09-12 DIAGNOSIS — I10 BENIGN ESSENTIAL HYPERTENSION: ICD-10-CM

## 2019-09-12 RX ORDER — ATENOLOL 25 MG/1
25 TABLET ORAL DAILY
Qty: 90 TABLET | Refills: 1 | Status: SHIPPED | OUTPATIENT
Start: 2019-09-12 | End: 2020-03-05 | Stop reason: SDUPTHER

## 2019-11-25 DIAGNOSIS — F41.9 ANXIETY: ICD-10-CM

## 2019-11-25 RX ORDER — ESCITALOPRAM OXALATE 10 MG/1
10 TABLET ORAL DAILY
Qty: 90 TABLET | Refills: 1 | Status: SHIPPED | OUTPATIENT
Start: 2019-11-25 | End: 2020-07-28 | Stop reason: SDUPTHER

## 2020-02-19 DIAGNOSIS — F41.9 ANXIETY: ICD-10-CM

## 2020-02-20 RX ORDER — LORAZEPAM 0.5 MG/1
0.5 TABLET ORAL DAILY PRN
Qty: 30 TABLET | OUTPATIENT
Start: 2020-02-20

## 2020-03-04 ENCOUNTER — TELEPHONE (OUTPATIENT)
Dept: FAMILY MEDICINE CLINIC | Facility: CLINIC | Age: 65
End: 2020-03-04

## 2020-03-04 DIAGNOSIS — F41.9 ANXIETY: ICD-10-CM

## 2020-03-04 DIAGNOSIS — I10 BENIGN ESSENTIAL HYPERTENSION: ICD-10-CM

## 2020-03-04 DIAGNOSIS — I10 ESSENTIAL HYPERTENSION: ICD-10-CM

## 2020-03-04 RX ORDER — ATENOLOL 25 MG/1
25 TABLET ORAL DAILY
Qty: 90 TABLET | Refills: 1 | OUTPATIENT
Start: 2020-03-04

## 2020-03-04 NOTE — TELEPHONE ENCOUNTER
Patient would like refill of Atenolol 25 mg tab take 1 tab daily 90 day supply and Lorazepam 0 5 mg tab take 1 tab as needed both sent to [facility] in [place]

## 2020-03-05 RX ORDER — ATENOLOL 25 MG/1
25 TABLET ORAL DAILY
Qty: 90 TABLET | Refills: 1 | Status: SHIPPED | OUTPATIENT
Start: 2020-03-05 | End: 2020-05-14 | Stop reason: SDUPTHER

## 2020-03-05 RX ORDER — LORAZEPAM 0.5 MG/1
0.5 TABLET ORAL DAILY PRN
Qty: 30 TABLET | Refills: 0 | Status: SHIPPED | OUTPATIENT
Start: 2020-03-05 | End: 2020-10-20 | Stop reason: SDUPTHER

## 2020-05-11 DIAGNOSIS — F41.9 ANXIETY: ICD-10-CM

## 2020-05-11 RX ORDER — ESCITALOPRAM OXALATE 10 MG/1
TABLET ORAL
Qty: 90 TABLET | Refills: 1 | OUTPATIENT
Start: 2020-05-11

## 2020-05-14 DIAGNOSIS — I10 BENIGN ESSENTIAL HYPERTENSION: ICD-10-CM

## 2020-05-14 DIAGNOSIS — I10 ESSENTIAL HYPERTENSION: ICD-10-CM

## 2020-05-14 RX ORDER — ATENOLOL 25 MG/1
25 TABLET ORAL DAILY
Qty: 90 TABLET | Refills: 1 | Status: SHIPPED | OUTPATIENT
Start: 2020-05-14 | End: 2020-12-08 | Stop reason: SDUPTHER

## 2020-06-11 ENCOUNTER — OFFICE VISIT (OUTPATIENT)
Dept: FAMILY MEDICINE CLINIC | Facility: CLINIC | Age: 65
End: 2020-06-11
Payer: COMMERCIAL

## 2020-06-11 VITALS
DIASTOLIC BLOOD PRESSURE: 84 MMHG | TEMPERATURE: 98.3 F | OXYGEN SATURATION: 98 % | HEIGHT: 65 IN | WEIGHT: 159.6 LBS | BODY MASS INDEX: 26.59 KG/M2 | RESPIRATION RATE: 18 BRPM | HEART RATE: 64 BPM | SYSTOLIC BLOOD PRESSURE: 126 MMHG

## 2020-06-11 DIAGNOSIS — Z13.6 SCREENING FOR CARDIOVASCULAR CONDITION: ICD-10-CM

## 2020-06-11 DIAGNOSIS — F41.9 ANXIETY: ICD-10-CM

## 2020-06-11 DIAGNOSIS — E78.2 MIXED HYPERLIPIDEMIA: ICD-10-CM

## 2020-06-11 DIAGNOSIS — Z13.1 SCREENING FOR DIABETES MELLITUS: ICD-10-CM

## 2020-06-11 DIAGNOSIS — Z00.00 ANNUAL PHYSICAL EXAM: Primary | ICD-10-CM

## 2020-06-11 DIAGNOSIS — E89.0 POSTPROCEDURAL HYPOTHYROIDISM: ICD-10-CM

## 2020-06-11 DIAGNOSIS — Z11.4 SCREENING FOR HIV (HUMAN IMMUNODEFICIENCY VIRUS): ICD-10-CM

## 2020-06-11 DIAGNOSIS — I10 BENIGN ESSENTIAL HYPERTENSION: ICD-10-CM

## 2020-06-11 PROBLEM — M35.3 POLYMYALGIA (HCC): Status: RESOLVED | Noted: 2019-06-17 | Resolved: 2020-06-11

## 2020-06-11 PROCEDURE — 99396 PREV VISIT EST AGE 40-64: CPT | Performed by: FAMILY MEDICINE

## 2020-06-11 PROCEDURE — 3008F BODY MASS INDEX DOCD: CPT | Performed by: FAMILY MEDICINE

## 2020-06-11 PROCEDURE — 3074F SYST BP LT 130 MM HG: CPT | Performed by: FAMILY MEDICINE

## 2020-06-11 PROCEDURE — 3079F DIAST BP 80-89 MM HG: CPT | Performed by: FAMILY MEDICINE

## 2020-06-20 DIAGNOSIS — F41.9 ANXIETY: ICD-10-CM

## 2020-06-20 LAB
ALBUMIN SERPL-MCNC: 4.8 G/DL (ref 3.8–4.8)
ALBUMIN/GLOB SERPL: 2.5 {RATIO} (ref 1.2–2.2)
ALP SERPL-CCNC: 79 IU/L (ref 39–117)
ALT SERPL-CCNC: 55 IU/L (ref 0–32)
APPEARANCE UR: CLEAR
AST SERPL-CCNC: 28 IU/L (ref 0–40)
BACTERIA URNS QL MICRO: ABNORMAL
BASOPHILS # BLD AUTO: 0 X10E3/UL (ref 0–0.2)
BASOPHILS NFR BLD AUTO: 1 %
BILIRUB SERPL-MCNC: 0.4 MG/DL (ref 0–1.2)
BILIRUB UR QL STRIP: NEGATIVE
BUN SERPL-MCNC: 11 MG/DL (ref 8–27)
BUN/CREAT SERPL: 16 (ref 12–28)
CALCIUM SERPL-MCNC: 9.6 MG/DL (ref 8.7–10.3)
CHLORIDE SERPL-SCNC: 101 MMOL/L (ref 96–106)
CHOLEST SERPL-MCNC: 216 MG/DL (ref 100–199)
CHOLEST/HDLC SERPL: 4.1 RATIO (ref 0–4.4)
CO2 SERPL-SCNC: 24 MMOL/L (ref 20–29)
COLOR UR: YELLOW
CREAT SERPL-MCNC: 0.7 MG/DL (ref 0.57–1)
EOSINOPHIL # BLD AUTO: 0.1 X10E3/UL (ref 0–0.4)
EOSINOPHIL NFR BLD AUTO: 2 %
EPI CELLS #/AREA URNS HPF: ABNORMAL /HPF (ref 0–10)
ERYTHROCYTE [DISTWIDTH] IN BLOOD BY AUTOMATED COUNT: 12.9 % (ref 11.7–15.4)
GLOBULIN SER-MCNC: 1.9 G/DL (ref 1.5–4.5)
GLUCOSE SERPL-MCNC: 97 MG/DL (ref 65–99)
GLUCOSE UR QL: NEGATIVE
HCT VFR BLD AUTO: 41.3 % (ref 34–46.6)
HDLC SERPL-MCNC: 53 MG/DL
HGB BLD-MCNC: 13.9 G/DL (ref 11.1–15.9)
HGB UR QL STRIP: NEGATIVE
HIV 1+2 AB+HIV1 P24 AG SERPL QL IA: NON REACTIVE
IMM GRANULOCYTES # BLD: 0 X10E3/UL (ref 0–0.1)
IMM GRANULOCYTES NFR BLD: 0 %
KETONES UR QL STRIP: NEGATIVE
LDLC SERPL CALC-MCNC: 127 MG/DL (ref 0–99)
LEUKOCYTE ESTERASE UR QL STRIP: ABNORMAL
LYMPHOCYTES # BLD AUTO: 1.8 X10E3/UL (ref 0.7–3.1)
LYMPHOCYTES NFR BLD AUTO: 38 %
MCH RBC QN AUTO: 30.3 PG (ref 26.6–33)
MCHC RBC AUTO-ENTMCNC: 33.7 G/DL (ref 31.5–35.7)
MCV RBC AUTO: 90 FL (ref 79–97)
MICRO URNS: ABNORMAL
MONOCYTES # BLD AUTO: 0.4 X10E3/UL (ref 0.1–0.9)
MONOCYTES NFR BLD AUTO: 7 %
MUCOUS THREADS URNS QL MICRO: PRESENT
NEUTROPHILS # BLD AUTO: 2.5 X10E3/UL (ref 1.4–7)
NEUTROPHILS NFR BLD AUTO: 52 %
NITRITE UR QL STRIP: NEGATIVE
PH UR STRIP: 6 [PH] (ref 5–7.5)
PLATELET # BLD AUTO: 303 X10E3/UL (ref 150–450)
POTASSIUM SERPL-SCNC: 4.5 MMOL/L (ref 3.5–5.2)
PROT SERPL-MCNC: 6.7 G/DL (ref 6–8.5)
PROT UR QL STRIP: NEGATIVE
RBC # BLD AUTO: 4.58 X10E6/UL (ref 3.77–5.28)
RBC #/AREA URNS HPF: ABNORMAL /HPF (ref 0–2)
SL AMB EGFR AFRICAN AMERICAN: 106 ML/MIN/1.73
SL AMB EGFR NON AFRICAN AMERICAN: 92 ML/MIN/1.73
SL AMB VLDL CHOLESTEROL CALC: 36 MG/DL (ref 5–40)
SODIUM SERPL-SCNC: 140 MMOL/L (ref 134–144)
SP GR UR: 1.02 (ref 1–1.03)
TRIGL SERPL-MCNC: 182 MG/DL (ref 0–149)
UROBILINOGEN UR STRIP-ACNC: 0.2 MG/DL (ref 0.2–1)
WBC # BLD AUTO: 4.8 X10E3/UL (ref 3.4–10.8)
WBC #/AREA URNS HPF: ABNORMAL /HPF (ref 0–5)

## 2020-06-22 RX ORDER — ESCITALOPRAM OXALATE 10 MG/1
TABLET ORAL
Qty: 90 TABLET | Refills: 1 | OUTPATIENT
Start: 2020-06-22

## 2020-07-28 ENCOUNTER — TELEPHONE (OUTPATIENT)
Dept: FAMILY MEDICINE CLINIC | Facility: CLINIC | Age: 65
End: 2020-07-28

## 2020-07-28 DIAGNOSIS — F41.9 ANXIETY: ICD-10-CM

## 2020-07-28 RX ORDER — ESCITALOPRAM OXALATE 10 MG/1
10 TABLET ORAL DAILY
Qty: 90 TABLET | Refills: 1 | Status: SHIPPED | OUTPATIENT
Start: 2020-07-28 | End: 2021-06-15 | Stop reason: ALTCHOICE

## 2020-07-28 NOTE — TELEPHONE ENCOUNTER
escitalopram (LEXAPRO) 10 mg tablet [[id number]    Patient called, she is completely out of the medication  She said she called the Rx line on Friday  She needs Rx sent to [facility] in [place]  Thank you

## 2020-10-20 DIAGNOSIS — F41.9 ANXIETY: ICD-10-CM

## 2020-10-20 RX ORDER — LORAZEPAM 0.5 MG/1
0.5 TABLET ORAL DAILY PRN
Qty: 30 TABLET | Refills: 0 | Status: SHIPPED | OUTPATIENT
Start: 2020-10-20 | End: 2021-03-03 | Stop reason: SDUPTHER

## 2020-10-21 DIAGNOSIS — F41.9 ANXIETY: ICD-10-CM

## 2020-10-21 RX ORDER — LORAZEPAM 0.5 MG/1
0.5 TABLET ORAL DAILY PRN
Qty: 30 TABLET | Refills: 0 | OUTPATIENT
Start: 2020-10-21

## 2020-12-08 DIAGNOSIS — I10 ESSENTIAL HYPERTENSION: ICD-10-CM

## 2020-12-08 DIAGNOSIS — I10 BENIGN ESSENTIAL HYPERTENSION: ICD-10-CM

## 2020-12-08 RX ORDER — ATENOLOL 25 MG/1
25 TABLET ORAL DAILY
Qty: 90 TABLET | Refills: 1 | Status: SHIPPED | OUTPATIENT
Start: 2020-12-08 | End: 2021-08-23 | Stop reason: SDUPTHER

## 2021-03-03 DIAGNOSIS — F41.9 ANXIETY: ICD-10-CM

## 2021-03-03 RX ORDER — LORAZEPAM 0.5 MG/1
0.5 TABLET ORAL DAILY PRN
Qty: 30 TABLET | Refills: 0 | Status: SHIPPED | OUTPATIENT
Start: 2021-03-03 | End: 2021-08-13 | Stop reason: SDUPTHER

## 2021-03-04 DIAGNOSIS — Z23 ENCOUNTER FOR IMMUNIZATION: ICD-10-CM

## 2021-04-12 ENCOUNTER — OFFICE VISIT (OUTPATIENT)
Dept: FAMILY MEDICINE CLINIC | Facility: CLINIC | Age: 66
End: 2021-04-12
Payer: MEDICARE

## 2021-04-12 ENCOUNTER — APPOINTMENT (OUTPATIENT)
Dept: RADIOLOGY | Facility: CLINIC | Age: 66
End: 2021-04-12
Payer: MEDICARE

## 2021-04-12 VITALS
HEART RATE: 54 BPM | SYSTOLIC BLOOD PRESSURE: 128 MMHG | TEMPERATURE: 97.9 F | HEIGHT: 65 IN | OXYGEN SATURATION: 97 % | BODY MASS INDEX: 26.76 KG/M2 | WEIGHT: 160.6 LBS | DIASTOLIC BLOOD PRESSURE: 78 MMHG

## 2021-04-12 DIAGNOSIS — G89.29 CHRONIC PAIN OF LEFT ANKLE: ICD-10-CM

## 2021-04-12 DIAGNOSIS — M54.2 NECK PAIN: ICD-10-CM

## 2021-04-12 DIAGNOSIS — M79.672 LEFT FOOT PAIN: ICD-10-CM
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
## 2021-04-12 DIAGNOSIS — S49.91XS INJURY OF RIGHT SHOULDER, SEQUELA: ICD-10-CM

## 2021-04-12 DIAGNOSIS — M25.572 CHRONIC PAIN OF LEFT ANKLE: ICD-10-CM

## 2021-04-12 DIAGNOSIS — E89.0 POSTPROCEDURAL HYPOTHYROIDISM: ICD-10-CM

## 2021-04-12 DIAGNOSIS — S06.0X0S CONCUSSION WITHOUT LOSS OF CONSCIOUSNESS, SEQUELA (HCC): ICD-10-CM

## 2021-04-12 DIAGNOSIS — I10 BENIGN ESSENTIAL HYPERTENSION: Primary | ICD-10-CM

## 2021-04-12 DIAGNOSIS — F41.9 ANXIETY: ICD-10-CM

## 2021-04-12 PROBLEM — S49.91XA RIGHT SHOULDER INJURY: Status: ACTIVE | Noted: 2021-04-12

## 2021-04-12 PROBLEM — S06.0X0A CONCUSSION WITHOUT LOSS OF CONSCIOUSNESS: Status: ACTIVE | Noted: 2021-04-12

## 2021-04-12 PROCEDURE — 73630 X-RAY EXAM OF FOOT: CPT

## 2021-04-12 PROCEDURE — 73610 X-RAY EXAM OF ANKLE: CPT

## 2021-04-12 PROCEDURE — 99214 OFFICE O/P EST MOD 30 MIN: CPT | Performed by: FAMILY MEDICINE

## 2021-04-12 PROCEDURE — 72040 X-RAY EXAM NECK SPINE 2-3 VW: CPT

## 2021-04-12 PROCEDURE — 73030 X-RAY EXAM OF SHOULDER: CPT

## 2021-04-12 RX ORDER — THYROID 60 MG
60 TABLET ORAL DAILY
COMMUNITY
Start: 2021-02-08

## 2021-04-12 RX ORDER — THYROID,PORK 15 MG
15 TABLET ORAL DAILY
COMMUNITY
Start: 2021-03-13

## 2021-04-12 NOTE — PROGRESS NOTES
Edy Johnson 1955 female MRN: [id number]    Family Medicine Follow-up Visit    ASSESSMENT/PLAN  Problem List Items Addressed This Visit        Endocrine    Postprocedural hypothyroidism    Relevant Medications    [place] Thyroid 15 MG tablet    [place] Thyroid 60 MG tablet       Cardiovascular and Mediastinum    Benign essential hypertension - Primary       Nervous and Auditory    Concussion without loss of consciousness       Other    Anxiety    Right shoulder injury    Relevant Orders    XR shoulder 2+ vw right    XR spine cervical 2 or 3 vw injury    XR foot 3+ vw left    XR ankle 3+ vw left    Ambulatory referral to Physical Therapy    Neck pain    Relevant Orders    XR shoulder 2+ vw right    XR spine cervical 2 or 3 vw injury    XR foot 3+ vw left    XR ankle 3+ vw left    Ambulatory referral to Physical Therapy    Left foot pain    Relevant Orders    XR shoulder 2+ vw right    XR spine cervical 2 or 3 vw injury    XR foot 3+ vw left    XR ankle 3+ vw left    Chronic pain of left ankle    Relevant Orders    XR shoulder 2+ vw right    XR spine cervical 2 or 3 vw injury    XR foot 3+ vw left    XR ankle 3+ vw left      Other Visit Diagnoses     Fall, sequela        Relevant Orders    XR shoulder 2+ vw right    XR spine cervical 2 or 3 vw injury    XR foot 3+ vw left    XR ankle 3+ vw left          Patient had fall 2 months ago and has some lingering concerns  Will check imaging as she did not have any after her injury  Clinically sounds like she may have suffered a concussion  Her symptoms are improving over time  Advised her to continue to monitor if symptoms change or worsening further work up as indicated   Follow up in the summer for CP as planned or sooner if needed            Future Appointments   Date Time Provider Jessica Saenz   6/15/2021  9:45 AM DO [facility]          SUBJECTIVE  CC: Head Injury Rebekah Negron 2 months ago hit head on deck still having dizziness, left ankle pain, and right shoulder pain)      HPI:  Dhaval Naik is a 72 y o  female who presents for acute visit  Reports she fell on ice on her deck in February  Had dizziness for a month which was intense, it has improved in severity but still occassionally happens and is mild  Left foot was injured and her swelling is gone but it is still painful  Her right shoulder into her neck is achey still  At the time of injury did not seek care, no xrays, etc   Takes advil which helps some  HPI    Review of Systems   Constitutional: Negative for chills, fatigue and fever  HENT: Negative for congestion, postnasal drip, rhinorrhea and sinus pressure  Eyes: Negative for photophobia and visual disturbance  Respiratory: Negative for cough and shortness of breath  Cardiovascular: Negative for chest pain, palpitations and leg swelling  Gastrointestinal: Negative for abdominal pain, constipation, diarrhea, nausea and vomiting  Genitourinary: Negative for difficulty urinating and dysuria  Musculoskeletal: Positive for arthralgias and neck pain  Negative for myalgias  Skin: Negative for color change and rash  Neurological: Negative for dizziness, weakness, light-headedness and headaches         Historical Information   The patient history was reviewed as follows:    Past Medical History:   Diagnosis Date    Disease of thyroid gland     Hypertension      Past Surgical History:   Procedure Laterality Date    BREAST LUMPECTOMY Left     Benign    THYROID SURGERY      Total Thyroidectomy     Family History   Problem Relation Age of Onset    Arthritis Mother     Lung cancer Mother    Carlota Collier Lung cancer Father     COPD Brother     Diabetes Maternal Grandmother         Mellitus    Diabetes Paternal Grandmother         Mellitus      Social History   Social History     Substance and Sexual Activity   Alcohol Use Yes    Comment: Social     Social History     Substance and Sexual Activity   Drug Use No     Social History     Tobacco Use   Smoking Status Former Smoker    Types: Cigarettes    Quit date: 0    Years since quittin 2   Smokeless Tobacco Never Used       Medications:     Current Outpatient Medications:     [place] Thyroid 15 MG tablet, , Disp: , Rfl:     [place] Thyroid 60 MG tablet, , Disp: , Rfl:     atenolol (TENORMIN) 25 mg tablet, Take 1 tablet (25 mg total) by mouth daily At 8 PM, Disp: 90 tablet, Rfl: 1    LORazepam (ATIVAN) 0 5 mg tablet, Take 1 tablet (0 5 mg total) by mouth daily as needed for anxiety, Disp: 30 tablet, Rfl: 0    ARMOUR THYROID PO, Take 75 mg by mouth daily, Disp: , Rfl:     escitalopram (LEXAPRO) 10 mg tablet, Take 1 tablet (10 mg total) by mouth daily (Patient not taking: Reported on 2021), Disp: 90 tablet, Rfl: 1  No Known Allergies    OBJECTIVE    Vitals:   Vitals:    21 1009 21 1038   BP: 150/94 128/78   BP Location: Right arm    Patient Position: Sitting    Cuff Size: Standard    Pulse: (!) 54    Temp: 97 9 °F (36 6 °C)    TempSrc: Tympanic    SpO2: 97%    Weight: 72 8 kg (160 lb 9 6 oz)    Height: 5' 5" (1 651 m)            Physical Exam  Constitutional:       Appearance: She is well-developed  HENT:      Head: Normocephalic and atraumatic  Eyes:      Pupils: Pupils are equal, round, and reactive to light  Neck:      Musculoskeletal: Normal range of motion and neck supple  Cardiovascular:      Rate and Rhythm: Normal rate and regular rhythm  Heart sounds: Normal heart sounds  Pulmonary:      Effort: Pulmonary effort is normal  No respiratory distress  Breath sounds: Normal breath sounds  No wheezing  Abdominal:      General: Bowel sounds are normal  There is no distension  Palpations: Abdomen is soft  Tenderness: There is no abdominal tenderness  Musculoskeletal:      Right shoulder: She exhibits decreased range of motion, tenderness and spasm  Cervical back: She exhibits decreased range of motion and spasm  Skin:     General: Skin is warm and dry  Neurological:      Mental Status: She is alert and oriented to person, place, and time     Psychiatric:         Behavior: Behavior normal             Labs:        Hanna Jason DO    4/12/2021

## 2021-06-14 ENCOUNTER — TELEPHONE (OUTPATIENT)
Dept: ADMINISTRATIVE | Facility: OTHER | Age: 66
End: 2021-06-14

## 2021-06-14 NOTE — TELEPHONE ENCOUNTER
Upon review of the In Basket request we found the  is already up to date with the requested item  Any additional questions or concerns should be emailed to the Practice Liaisons via [email or web address] email, please do not reply via In Basket      Thank you  Jevon Turner

## 2021-06-15 ENCOUNTER — OFFICE VISIT (OUTPATIENT)
Dept: FAMILY MEDICINE CLINIC | Facility: CLINIC | Age: 66
End: 2021-06-15
Payer: MEDICARE

## 2021-06-15 VITALS
BODY MASS INDEX: 25.86 KG/M2 | TEMPERATURE: 97.6 F | DIASTOLIC BLOOD PRESSURE: 84 MMHG | HEART RATE: 58 BPM | RESPIRATION RATE: 15 BRPM | WEIGHT: 155.2 LBS | OXYGEN SATURATION: 95 % | SYSTOLIC BLOOD PRESSURE: 122 MMHG | HEIGHT: 65 IN

## 2021-06-15 DIAGNOSIS — Z12.12 SCREENING FOR COLORECTAL CANCER: ICD-10-CM

## 2021-06-15 DIAGNOSIS — I10 BENIGN ESSENTIAL HYPERTENSION: ICD-10-CM

## 2021-06-15 DIAGNOSIS — Z00.00 WELCOME TO MEDICARE PREVENTIVE VISIT: Primary | ICD-10-CM

## 2021-06-15 DIAGNOSIS — Z23 ENCOUNTER FOR IMMUNIZATION: ICD-10-CM

## 2021-06-15 DIAGNOSIS — E78.2 MIXED HYPERLIPIDEMIA: ICD-10-CM

## 2021-06-15 DIAGNOSIS — Z23 NEED FOR VACCINATION: ICD-10-CM

## 2021-06-15 DIAGNOSIS — Z12.11 SCREENING FOR COLORECTAL CANCER: ICD-10-CM

## 2021-06-15 DIAGNOSIS — E89.0 POSTPROCEDURAL HYPOTHYROIDISM: ICD-10-CM

## 2021-06-15 DIAGNOSIS — Z78.0 POSTMENOPAUSAL: ICD-10-CM

## 2021-06-15 DIAGNOSIS — Z13.1 SCREENING FOR DIABETES MELLITUS: ICD-10-CM

## 2021-06-15 DIAGNOSIS — Z13.6 SCREENING FOR CARDIOVASCULAR CONDITION: ICD-10-CM

## 2021-06-15 DIAGNOSIS — Z12.4 SCREENING FOR CERVICAL CANCER: ICD-10-CM

## 2021-06-15 DIAGNOSIS — Z12.31 ENCOUNTER FOR SCREENING MAMMOGRAM FOR BREAST CANCER: ICD-10-CM

## 2021-06-15 DIAGNOSIS — F41.9 ANXIETY: ICD-10-CM

## 2021-06-15 PROCEDURE — G0009 ADMIN PNEUMOCOCCAL VACCINE: HCPCS | Performed by: FAMILY MEDICINE

## 2021-06-15 PROCEDURE — 90471 IMMUNIZATION ADMIN: CPT | Performed by: FAMILY MEDICINE

## 2021-06-15 PROCEDURE — G0402 INITIAL PREVENTIVE EXAM: HCPCS | Performed by: FAMILY MEDICINE

## 2021-06-15 PROCEDURE — 90670 PCV13 VACCINE IM: CPT | Performed by: FAMILY MEDICINE

## 2021-06-15 PROCEDURE — G0403 EKG FOR INITIAL PREVENT EXAM: HCPCS | Performed by: FAMILY MEDICINE

## 2021-06-15 PROCEDURE — 90715 TDAP VACCINE 7 YRS/> IM: CPT | Performed by: FAMILY MEDICINE

## 2021-06-15 PROCEDURE — 1123F ACP DISCUSS/DSCN MKR DOCD: CPT | Performed by: FAMILY MEDICINE

## 2021-06-15 RX ORDER — ZOSTER VACCINE RECOMBINANT, ADJUVANTED 50 MCG/0.5
0.5 KIT INTRAMUSCULAR ONCE
Qty: 1 EACH | Refills: 1 | Status: SHIPPED | OUTPATIENT
Start: 2021-06-15 | End: 2021-06-15

## 2021-06-15 NOTE — PROGRESS NOTES
Assessment and Plan:     Problem List Items Addressed This Visit        Endocrine    Postprocedural hypothyroidism    Relevant Orders    Lipid panel    Comprehensive metabolic panel    CBC and differential    TSH, 3rd generation with Free T4 reflex    UA (URINE) with reflex to Scope       Cardiovascular and Mediastinum    Benign essential hypertension    Relevant Orders    Lipid panel    Comprehensive metabolic panel    CBC and differential    TSH, 3rd generation with Free T4 reflex    UA (URINE) with reflex to Scope       Other    Mixed hyperlipidemia    Relevant Orders    Lipid panel    Comprehensive metabolic panel    CBC and differential    TSH, 3rd generation with Free T4 reflex    UA (URINE) with reflex to Scope    Anxiety    Relevant Orders    Lipid panel    Comprehensive metabolic panel    CBC and differential    TSH, 3rd generation with Free T4 reflex    UA (URINE) with reflex to Scope    BMI 25 0-25 9,adult     BMI Counseling: Body mass index is 25 83 kg/m²  The BMI is above normal  Nutrition recommendations include reducing portion sizes, decreasing overall calorie intake and 3-5 servings of fruits/vegetables daily  Exercise recommendations include exercising 3-5 times per week           Relevant Orders    Lipid panel    Comprehensive metabolic panel    CBC and differential    TSH, 3rd generation with Free T4 reflex    UA (URINE) with reflex to Scope      Other Visit Diagnoses     Welcome to Medicare preventive visit    -  Primary    Relevant Orders    Lipid panel    Comprehensive metabolic panel    Screening for colorectal cancer        Relevant Orders    Ambulatory referral to Gastroenterology    Ambulatory referral to Gastroenterology    Encounter for immunization        Relevant Orders    TDAP VACCINE GREATER THAN OR EQUAL TO 8YO IM (Completed)    Screening for cervical cancer        Encounter for screening mammogram for breast cancer        Relevant Orders    Mammo screening bilateral w 3d & cad Need for vaccination        Relevant Medications    Zoster Vac Recomb Adjuvanted (Shingrix) 50 MCG/0 5ML SUSR    Other Relevant Orders    PNEUMOCOCCAL CONJUGATE VACCINE 13-VALENT GREATER THAN 6 MONTHS (Completed)    Postmenopausal        Relevant Orders    DXA bone density spine hip and pelvis    Screening for cardiovascular condition        Relevant Orders    POCT ECG (Completed)    Screening for diabetes mellitus        Relevant Orders    Lipid panel    Comprehensive metabolic panel        Patient reports more recent colonoscopy done in [place]- will need to get records   Mammogram she reports was also done in the last year   Patient continues follow up with gyn and endocrine in [facility]: balance, strength, and gait training instructions were provided  Preventive health issues were discussed with patient, and age appropriate screening tests were ordered as noted in patient's After Visit Summary  Personalized health advice and appropriate referrals for health education or preventive services given if needed, as noted in patient's After Visit Summary  History of Present Illness:     Patient presents for Welcome to Medicare visit  Patient Care Team:  DO Eugenio as PCP - General (Family Medicine)  Lucia Sanz MD  [address] MD     Review of Systems:     Review of Systems   Constitutional: Negative for chills, fatigue and fever  HENT: Negative for congestion, postnasal drip, rhinorrhea and sinus pressure  Eyes: Negative for photophobia and visual disturbance  Respiratory: Negative for cough and shortness of breath  Cardiovascular: Negative for chest pain, palpitations and leg swelling  Gastrointestinal: Negative for abdominal pain, constipation, diarrhea, nausea and vomiting  Genitourinary: Negative for difficulty urinating and dysuria  Musculoskeletal: Negative for arthralgias and myalgias  Skin: Negative for color change and rash  Neurological: Negative for dizziness, weakness, light-headedness and headaches  Problem List:     Patient Active Problem List   Diagnosis    Postprocedural hypothyroidism    Mixed hyperlipidemia    Benign essential hypertension    Anxiety    Chronic fatigue    Encounter for screening colonoscopy    Sleep disorder    Elevated LFTs    Pain in both lower extremities    Acute pain of both knees    Chronic bilateral low back pain with bilateral sciatica    Right shoulder injury    Neck pain    Left foot pain    Chronic pain of left ankle    Concussion without loss of consciousness    BMI 25 0-25 9,adult      Past Medical and Surgical History:     Past Medical History:   Diagnosis Date    Disease of thyroid gland     Hypertension      Past Surgical History:   Procedure Laterality Date    BREAST LUMPECTOMY Left     Benign    THYROID SURGERY      Total Thyroidectomy      Family History:     Family History   Problem Relation Age of Onset    Arthritis Mother     Lung cancer Mother    [facility] Lung [facility] Father     COPD Brother     Diabetes Maternal Grandmother         Mellitus    Diabetes Paternal Grandmother         Mellitus      Social History:     Social History     Socioeconomic History    Marital status:      Spouse name: None    Number of children: 2    Years of education: None    Highest education level: None   Occupational History    None   Tobacco Use    Smoking status: Former Smoker     Packs/day: 1 00     Years: 30 00     Pack years: 30 00     Types: Cigarettes     Quit date:      Years since quittin 4    Smokeless tobacco: Never Used   Vaping Use    Vaping Use: Never used   Substance and Sexual Activity    Alcohol use:  Yes     Alcohol/week: 14 0 standard drinks     Types: 14 Glasses of wine per week     Comment: Social    Drug use: No    Sexual activity: None   Other Topics Concern    None   Social History Narrative    Caffeine use     Social Determinants of Health     Financial Resource Strain: Low Risk     Difficulty of Paying Living Expenses: Not hard at all   Food Insecurity: No Food Insecurity    Worried About Running Out of Food in the Last Year: Never true    Ml of Food in the Last Year: Never true   Transportation Needs: No Transportation Needs    Lack of Transportation (Medical): No    Lack of Transportation (Non-Medical): No   Physical Activity: Inactive    Days of Exercise per Week: 0 days    Minutes of Exercise per Session: 0 min   Stress: Stress Concern Present    Feeling of Stress : To some extent   Social Connections: Socially Isolated    Frequency of Communication with Friends and Family: More than three times a week    Frequency of Social Gatherings with Friends and Family: More than three times a week    Attends Christian Services: Never    Active Member of Clubs or Organizations: No    Attends Club or Organization Meetings: Never    Marital Status:    Intimate Partner Violence: Not At Risk    Fear of Current or Ex-Partner: No    Emotionally Abused: No    Physically Abused: No    Sexually Abused: No      Medications and Allergies:     Current Outpatient Medications   Medication Sig Dispense Refill    Bitely Thyroid 15 MG tablet Take 15 mg by mouth daily       Bitely Thyroid 60 MG tablet Take 60 mg by mouth daily       atenolol (TENORMIN) 25 mg tablet Take 1 tablet (25 mg total) by mouth daily At 8 PM 90 tablet 1    LORazepam (ATIVAN) 0 5 mg tablet Take 1 tablet (0 5 mg total) by mouth daily as needed for anxiety 30 tablet 0    ARMOUR THYROID PO Take 75 mg by mouth daily      Zoster Vac Recomb Adjuvanted (Shingrix) 50 MCG/0 5ML SUSR Inject 0 5 mL into a muscle once for 1 dose Repeat dose in 2 to 6 months 1 each 1     No current facility-administered medications for this visit       No Known Allergies   Immunizations:     Immunization History   Administered Date(s) Administered    Influenza, seasonal, injectable 11/19/2013    Pneumococcal Conjugate 13-Valent 06/15/2021    Tdap 06/15/2021      Health Maintenance:         Topic Date Due    Colorectal Cancer Screening  12/17/2018    DXA SCAN  03/06/2020    MAMMOGRAM  12/04/2020    Cervical Cancer Screening  06/15/2022 (Originally 3/9/2021)    HIV Screening  Completed    Hepatitis C Screening  Completed         Topic Date Due    COVID-19 Vaccine (1) Never done    Influenza Vaccine (Season Ended) 09/01/2021      Medicare Screening Tests and Risk Assessments:     Josiane Le is here for her Welcome to Medicare visit  Health Risk Assessment:   Patient rates overall health as fair  Patient feels that their physical health rating is same  Patient is very satisfied with their life  Eyesight was rated as same  Hearing was rated as same  Patient feels that their emotional and mental health rating is same  Patients states they are never, rarely angry  Patient states they are never, rarely unusually tired/fatigued  Pain experienced in the last 7 days has been none  Patient states that she has experienced no weight loss or gain in last 6 months  Depression Screening:   PHQ-2 Score: 0      Fall Risk Screening: In the past year, patient has experienced: no history of falling in past year      Urinary Incontinence Screening:   Patient has not leaked urine accidently in the last six months  Home Safety:  Patient does not have trouble with stairs inside or outside of their home  Patient has working smoke alarms and has working carbon monoxide detector  Home safety hazards include: none  Nutrition:   Current diet is Regular  Medications:   Patient is not currently taking any over-the-counter supplements  Patient is able to manage medications  Activities of Daily Living (ADLs)/Instrumental Activities of Daily Living (IADLs):   Walk and transfer into and out of bed and chair?: Yes  Dress and groom yourself?: Yes    Bathe or shower yourself?: Yes    Feed yourself? Yes  Do your laundry/housekeeping?: Yes  Manage your money, pay your bills and track your expenses?: Yes  Make your own meals?: Yes    Do your own shopping?: Yes    Previous Hospitalizations:   Any hospitalizations or ED visits within the last 12 months?: No      Advance Care Planning:   Living will: Yes    Advanced directive: Yes      Cognitive Screening:   Provider or family/friend/caregiver concerned regarding cognition?: No    PREVENTIVE SCREENINGS      Cardiovascular Screening:    General: Screening Not Indicated, History Lipid Disorder and Risks and Benefits Discussed      Diabetes Screening:     General: Screening Current and Risks and Benefits Discussed      Colorectal Cancer Screening:     General: Screening Current      Breast Cancer Screening:     General: Screening Current and Risks and Benefits Discussed      Cervical Cancer Screening:    General: Screening Not Indicated and Risks and Benefits Discussed      Osteoporosis Screening:    General: Risks and Benefits Discussed      Lung Cancer Screening:     General: Screening Not Indicated      Hepatitis C Screening:    General: Screening Current    Screening, Brief Intervention, and Referral to Treatment (SBIRT)    Screening  Typical number of drinks in a day: 2  Typical number of drinks in a week: 5  Interpretation: Low risk drinking behavior      Single Item Drug Screening:  How often have you used an illegal drug (including marijuana) or a prescription medication for non-medical reasons in the past year? never    Single Item Drug Screen Score: 0  Interpretation: Negative screen for possible drug use disorder     Visual Acuity Screening    Right eye Left eye Both eyes   Without correction:      With correction: 20/25 20/25 20/20   Comments: Contacts        Physical Exam:     /84 (BP Location: Left arm, Patient Position: Sitting, Cuff Size: Standard)   Pulse 58   Temp 97 6 °F (36 4 °C) (Tympanic)   Resp 15   Ht 5' 5" (1 651 m)   Wt 70 4 kg (155 lb 3 2 oz)   LMP  (LMP Unknown)   SpO2 95%   BMI 25 83 kg/m²     Physical Exam  Constitutional:       General: She is not in acute distress  Appearance: Normal appearance  She is not ill-appearing, toxic-appearing or diaphoretic  HENT:      Head: Normocephalic and atraumatic  Right Ear: Tympanic membrane and ear canal normal       Left Ear: Tympanic membrane and ear canal normal       Nose: Nose normal  No congestion  Mouth/Throat:      Mouth: Mucous membranes are moist       Pharynx: Oropharynx is clear  No oropharyngeal exudate  Eyes:      Extraocular Movements: Extraocular movements intact  Conjunctiva/sclera: Conjunctivae normal       Pupils: Pupils are equal, round, and reactive to light  Cardiovascular:      Rate and Rhythm: Normal rate and regular rhythm  Pulses: Normal pulses  Heart sounds: No murmur heard  Pulmonary:      Effort: Pulmonary effort is normal       Breath sounds: Normal breath sounds  No wheezing, rhonchi or rales  Abdominal:      General: Bowel sounds are normal  There is no distension  Palpations: Abdomen is soft  Tenderness: There is no abdominal tenderness  Musculoskeletal:         General: No swelling or tenderness  Normal range of motion  Cervical back: Normal range of motion and neck supple  Skin:     General: Skin is warm and dry  Capillary Refill: Capillary refill takes less than 2 seconds  Neurological:      General: No focal deficit present  Mental Status: She is alert and oriented to person, place, and time  Cranial Nerves: No cranial nerve deficit  Psychiatric:         Mood and Affect: Mood normal          Behavior: Behavior normal          Thought Content:  Thought content normal           Darrol Sample, DO

## 2021-06-15 NOTE — ASSESSMENT & PLAN NOTE
BMI Counseling: Body mass index is 25 83 kg/m²  The BMI is above normal  Nutrition recommendations include reducing portion sizes, decreasing overall calorie intake and 3-5 servings of fruits/vegetables daily  Exercise recommendations include exercising 3-5 times per week

## 2021-06-15 NOTE — PATIENT INSTRUCTIONS
Medicare Preventive Visit Patient Instructions  Thank you for completing your Welcome to Medicare Visit or Medicare Annual Wellness Visit today  Your next wellness visit will be due in one year (6/16/2022)  The screening/preventive services that you may require over the next 5-10 years are detailed below  Some tests may not apply to you based off risk factors and/or age  Screening tests ordered at today's visit but not completed yet may show as past due  Also, please note that scanned in results may not display below  Preventive Screenings:  Service Recommendations Previous Testing/Comments   Colorectal Cancer Screening  * Colonoscopy    * Fecal Occult Blood Test (FOBT)/Fecal Immunochemical Test (FIT)  * Fecal DNA/Cologuard Test  * Flexible Sigmoidoscopy Age: 54-65 years old   Colonoscopy: every 10 years (may be performed more frequently if at higher risk)  OR  FOBT/FIT: every 1 year  OR  Cologuard: every 3 years  OR  Sigmoidoscopy: every 5 years  Screening may be recommended earlier than age 48 if at higher risk for colorectal cancer  Also, an individualized decision between you and your healthcare provider will decide whether screening between the ages of 74-80 would be appropriate  Colonoscopy: 12/17/2013  FOBT/FIT: Not on file  Cologuard: Not on file  Sigmoidoscopy: Not on file          Breast Cancer Screening Age: 36 years old  Frequency: every 1-2 years  Not required if history of left and right mastectomy Mammogram: 12/04/2019        Cervical Cancer Screening Between the ages of 21-29, pap smear recommended once every 3 years  Between the ages of 33-67, can perform pap smear with HPV co-testing every 5 years     Recommendations may differ for women with a history of total hysterectomy, cervical cancer, or abnormal pap smears in past  Pap Smear: 03/09/2020        Hepatitis C Screening Once for adults born between 1945 and 1965  More frequently in patients at high risk for Hepatitis C Hep C Antibody: 01/29/2019        Diabetes Screening 1-2 times per year if you're at risk for diabetes or have pre-diabetes Fasting glucose: 97 mg/dL   A1C: No results in last 5 years        Cholesterol Screening Once every 5 years if you don't have a lipid disorder  May order more often based on risk factors  Lipid panel: 06/19/2020          Other Preventive Screenings Covered by Medicare:  1  Abdominal Aortic Aneurysm (AAA) Screening: covered once if your at risk  You're considered to be at risk if you have a family history of AAA  2  Lung Cancer Screening: covers low dose CT scan once per year if you meet all of the following conditions: (1) Age 50-69; (2) No signs or symptoms of lung cancer; (3) Current smoker or have quit smoking within the last 15 years; (4) You have a tobacco smoking history of at least 30 pack years (packs per day multiplied by number of years you smoked); (5) You get a written order from a healthcare provider  3  Glaucoma Screening: covered annually if you're considered high risk: (1) You have diabetes OR (2) Family history of glaucoma OR (3)  aged 48 and older OR (3)  American aged 72 and older  3  Osteoporosis Screening: covered every 2 years if you meet one of the following conditions: (1) You're estrogen deficient and at risk for osteoporosis based off medical history and other findings; (2) Have a vertebral abnormality; (3) On glucocorticoid therapy for more than 3 months; (4) Have primary hyperparathyroidism; (5) On osteoporosis medications and need to assess response to drug therapy  · Last bone density test (DXA Scan): 03/06/2015  5  HIV Screening: covered annually if you're between the age of 12-76  Also covered annually if you are younger than 13 and older than 72 with risk factors for HIV infection  For pregnant patients, it is covered up to 3 times per pregnancy      Immunizations:  Immunization Recommendations   Influenza Vaccine Annual influenza vaccination during flu season is recommended for all persons aged >= 6 months who do not have contraindications   Pneumococcal Vaccine (Prevnar and Pneumovax)  * Prevnar = PCV13  * Pneumovax = PPSV23   Adults 25-60 years old: 1-3 doses may be recommended based on certain risk factors  Adults 72 years old: Prevnar (PCV13) vaccine recommended followed by Pneumovax (PPSV23) vaccine  If already received PPSV23 since turning 65, then PCV13 recommended at least one year after PPSV23 dose  Hepatitis B Vaccine 3 dose series if at intermediate or high risk (ex: diabetes, end stage renal disease, liver disease)   Tetanus (Td) Vaccine - COST NOT COVERED BY MEDICARE PART B Following completion of primary series, a booster dose should be given every 10 years to maintain immunity against tetanus  Td may also be given as tetanus wound prophylaxis  Tdap Vaccine - COST NOT COVERED BY MEDICARE PART B Recommended at least once for all adults  For pregnant patients, recommended with each pregnancy  Shingles Vaccine (Shingrix) - COST NOT COVERED BY MEDICARE PART B  2 shot series recommended in those aged 48 and above     Health Maintenance Due:      Topic Date Due    Colorectal Cancer Screening  12/17/2018    DXA SCAN  03/06/2020    MAMMOGRAM  12/04/2020    Cervical Cancer Screening  03/09/2021    HIV Screening  Completed    Hepatitis C Screening  Completed     Immunizations Due:      Topic Date Due    COVID-19 Vaccine (1) Never done    DTaP,Tdap,and Td Vaccines (1 - Tdap) Never done    Pneumococcal Vaccine: 65+ Years (1 of 1 - PPSV23) Never done    Influenza Vaccine (Season Ended) 09/01/2021     Advance Directives   What are advance directives? Advance directives are legal documents that state your wishes and plans for medical care  These plans are made ahead of time in case you lose your ability to make decisions for yourself   Advance directives can apply to any medical decision, such as the treatments you want, and if you want to donate organs  What are the types of advance directives? There are many types of advance directives, and each state has rules about how to use them  You may choose a combination of any of the following:  · Living will: This is a written record of the treatment you want  You can also choose which treatments you do not want, which to limit, and which to stop at a certain time  This includes surgery, medicine, IV fluid, and tube feedings  · Durable power of  for healthcare [facility]): This is a written record that states who you want to make healthcare choices for you when you are unable to make them for yourself  This person, called a proxy, is usually a family member or a friend  You may choose more than 1 proxy  · Do not resuscitate (DNR) order:  A DNR order is used in case your heart stops beating or you stop breathing  It is a request not to have certain forms of treatment, such as CPR  A DNR order may be included in other types of advance directives  · Medical directive: This covers the care that you want if you are in a coma, near death, or unable to make decisions for yourself  You can list the treatments you want for each condition  Treatment may include pain medicine, surgery, blood transfusions, dialysis, IV or tube feedings, and a ventilator (breathing machine)  · Values history: This document has questions about your views, beliefs, and how you feel and think about life  This information can help others choose the care that you would choose  Why are advance directives important? An advance directive helps you control your care  Although spoken wishes may be used, it is better to have your wishes written down  Spoken wishes can be misunderstood, or not followed  Treatments may be given even if you do not want them  An advance directive may make it easier for your family to make difficult choices about your care     Weight Management   Why it is important to manage your weight:  Being overweight increases your risk of health conditions such as heart disease, high blood pressure, type 2 diabetes, and certain types of cancer  It can also increase your risk for osteoarthritis, sleep apnea, and other respiratory problems  Aim for a slow, steady weight loss  Even a small amount of weight loss can lower your risk of health problems  How to lose weight safely:  A safe and healthy way to lose weight is to eat fewer calories and get regular exercise  You can lose up about 1 pound a week by decreasing the number of calories you eat by 500 calories each day  Healthy meal plan for weight management:  A healthy meal plan includes a variety of foods, contains fewer calories, and helps you stay healthy  A healthy meal plan includes the following:  · Eat whole-grain foods more often  A healthy meal plan should contain fiber  Fiber is the part of grains, fruits, and vegetables that is not broken down by your body  Whole-grain foods are healthy and provide extra fiber in your diet  Some examples of whole-grain foods are whole-wheat breads and pastas, oatmeal, brown rice, and bulgur  · Eat a variety of vegetables every day  Include dark, leafy greens such as spinach, kale, lucille greens, and mustard greens  Eat yellow and orange vegetables such as carrots, sweet potatoes, and winter squash  · Eat a variety of fruits every day  Choose fresh or canned fruit (canned in its own juice or light syrup) instead of juice  Fruit juice has very little or no fiber  · Eat low-fat dairy foods  Drink fat-free (skim) milk or 1% milk  Eat fat-free yogurt and low-fat cottage cheese  Try low-fat cheeses such as mozzarella and other reduced-fat cheeses  · Choose meat and other protein foods that are low in fat  Choose beans or other legumes such as split peas or lentils  Choose fish, skinless poultry (chicken or turkey), or lean cuts of red meat (beef or pork)  Before you cook meat or poultry, cut off any visible fat  · Use less fat and oil  Try baking foods instead of frying them  Add less fat, such as margarine, sour cream, regular salad dressing and mayonnaise to foods  Eat fewer high-fat foods  Some examples of high-fat foods include french fries, doughnuts, ice cream, and cakes  · Eat fewer sweets  Limit foods and drinks that are high in sugar  This includes candy, cookies, regular soda, and sweetened drinks  Exercise:  Exercise at least 30 minutes per day on most days of the week  Some examples of exercise include walking, biking, dancing, and swimming  You can also fit in more physical activity by taking the stairs instead of the elevator or parking farther away from stores  Ask your healthcare provider about the best exercise plan for you  © Copyright [employer] 2018 Information is for End User's use only and may not be sold, redistributed or otherwise used for commercial purposes   All illustrations and images included in CareNotes® are the copyrighted property of [employer]  or [address] [place] [address]

## 2021-06-17 LAB
ALBUMIN SERPL-MCNC: 4.5 G/DL (ref 3.8–4.8)
ALBUMIN/GLOB SERPL: 2.1 {RATIO} (ref 1.2–2.2)
ALP SERPL-CCNC: 86 IU/L (ref 48–121)
ALT SERPL-CCNC: 34 IU/L (ref 0–32)
APPEARANCE UR: ABNORMAL
AST SERPL-CCNC: 18 IU/L (ref 0–40)
BACTERIA URNS QL MICRO: ABNORMAL
BASOPHILS # BLD AUTO: 0.1 X10E3/UL (ref 0–0.2)
BASOPHILS NFR BLD AUTO: 1 %
BILIRUB SERPL-MCNC: 0.6 MG/DL (ref 0–1.2)
BILIRUB UR QL STRIP: NEGATIVE
BUN SERPL-MCNC: 14 MG/DL (ref 8–27)
BUN/CREAT SERPL: 18 (ref 12–28)
CALCIUM SERPL-MCNC: 9.6 MG/DL (ref 8.7–10.3)
CASTS URNS QL MICRO: ABNORMAL /LPF
CHLORIDE SERPL-SCNC: 102 MMOL/L (ref 96–106)
CHOLEST SERPL-MCNC: 209 MG/DL (ref 100–199)
CHOLEST/HDLC SERPL: 3.9 RATIO (ref 0–4.4)
CO2 SERPL-SCNC: 25 MMOL/L (ref 20–29)
COLOR UR: YELLOW
CREAT SERPL-MCNC: 0.76 MG/DL (ref 0.57–1)
EOSINOPHIL # BLD AUTO: 0.1 X10E3/UL (ref 0–0.4)
EOSINOPHIL NFR BLD AUTO: 1 %
EPI CELLS #/AREA URNS HPF: >10 /HPF (ref 0–10)
ERYTHROCYTE [DISTWIDTH] IN BLOOD BY AUTOMATED COUNT: 12.6 % (ref 11.7–15.4)
GLOBULIN SER-MCNC: 2.1 G/DL (ref 1.5–4.5)
GLUCOSE SERPL-MCNC: 102 MG/DL (ref 65–99)
GLUCOSE UR QL: NEGATIVE
HCT VFR BLD AUTO: 40.5 % (ref 34–46.6)
HDLC SERPL-MCNC: 54 MG/DL
HGB BLD-MCNC: 13.7 G/DL (ref 11.1–15.9)
HGB UR QL STRIP: ABNORMAL
IMM GRANULOCYTES # BLD: 0 X10E3/UL (ref 0–0.1)
IMM GRANULOCYTES NFR BLD: 0 %
KETONES UR QL STRIP: NEGATIVE
LDLC SERPL CALC-MCNC: 129 MG/DL (ref 0–99)
LEUKOCYTE ESTERASE UR QL STRIP: ABNORMAL
LYMPHOCYTES # BLD AUTO: 1.8 X10E3/UL (ref 0.7–3.1)
LYMPHOCYTES NFR BLD AUTO: 21 %
MCH RBC QN AUTO: 29.8 PG (ref 26.6–33)
MCHC RBC AUTO-ENTMCNC: 33.8 G/DL (ref 31.5–35.7)
MCV RBC AUTO: 88 FL (ref 79–97)
MICRO URNS: ABNORMAL
MONOCYTES # BLD AUTO: 0.6 X10E3/UL (ref 0.1–0.9)
MONOCYTES NFR BLD AUTO: 7 %
NEUTROPHILS # BLD AUTO: 5.8 X10E3/UL (ref 1.4–7)
NEUTROPHILS NFR BLD AUTO: 70 %
NITRITE UR QL STRIP: NEGATIVE
PH UR STRIP: 5.5 [PH] (ref 5–7.5)
PLATELET # BLD AUTO: 316 X10E3/UL (ref 150–450)
POTASSIUM SERPL-SCNC: 4.2 MMOL/L (ref 3.5–5.2)
PROT SERPL-MCNC: 6.6 G/DL (ref 6–8.5)
PROT UR QL STRIP: NEGATIVE
RBC # BLD AUTO: 4.59 X10E6/UL (ref 3.77–5.28)
RBC #/AREA URNS HPF: ABNORMAL /HPF (ref 0–2)
SL AMB EGFR AFRICAN AMERICAN: 95 ML/MIN/1.73
SL AMB EGFR NON AFRICAN AMERICAN: 83 ML/MIN/1.73
SL AMB T4, FREE (DIRECT): 0.67 NG/DL (ref 0.82–1.77)
SL AMB VLDL CHOLESTEROL CALC: 26 MG/DL (ref 5–40)
SODIUM SERPL-SCNC: 139 MMOL/L (ref 134–144)
SP GR UR: 1.02 (ref 1–1.03)
TRIGL SERPL-MCNC: 149 MG/DL (ref 0–149)
TSH SERPL DL<=0.005 MIU/L-ACNC: 6.19 UIU/ML (ref 0.45–4.5)
UROBILINOGEN UR STRIP-ACNC: 0.2 MG/DL (ref 0.2–1)
WBC # BLD AUTO: 8.3 X10E3/UL (ref 3.4–10.8)
WBC #/AREA URNS HPF: ABNORMAL /HPF (ref 0–5)

## 2021-06-18 DIAGNOSIS — R82.90 ABNORMAL URINALYSIS: Primary | ICD-10-CM

## 2021-07-15 DIAGNOSIS — N30.00 ACUTE CYSTITIS WITHOUT HEMATURIA: Primary | ICD-10-CM

## 2021-07-15 LAB
APPEARANCE UR: CLEAR
BACTERIA UR CULT: ABNORMAL
BACTERIA URNS QL MICRO: NORMAL
BILIRUB UR QL STRIP: NEGATIVE
CASTS URNS QL MICRO: NORMAL /LPF
COLOR UR: YELLOW
EPI CELLS #/AREA URNS HPF: NORMAL /HPF (ref 0–10)
GLUCOSE UR QL: NEGATIVE
HGB UR QL STRIP: NEGATIVE
KETONES UR QL STRIP: NEGATIVE
LEUKOCYTE ESTERASE UR QL STRIP: ABNORMAL
Lab: ABNORMAL
MICRO URNS: ABNORMAL
NITRITE UR QL STRIP: NEGATIVE
PH UR STRIP: 6 [PH] (ref 5–7.5)
PROT UR QL STRIP: NEGATIVE
RBC #/AREA URNS HPF: NORMAL /HPF (ref 0–2)
SL AMB ANTIMICROBIAL SUSCEPTIBILITY: ABNORMAL
SP GR UR: 1.01 (ref 1–1.03)
UROBILINOGEN UR STRIP-ACNC: 0.2 MG/DL (ref 0.2–1)
WBC #/AREA URNS HPF: NORMAL /HPF (ref 0–5)

## 2021-07-15 RX ORDER — NITROFURANTOIN 25; 75 MG/1; MG/1
100 CAPSULE ORAL 2 TIMES DAILY
Qty: 10 CAPSULE | Refills: 0 | Status: SHIPPED | OUTPATIENT
Start: 2021-07-15 | End: 2021-07-20

## 2021-08-13 DIAGNOSIS — F41.9 ANXIETY: ICD-10-CM

## 2021-08-13 RX ORDER — LORAZEPAM 0.5 MG/1
0.5 TABLET ORAL DAILY PRN
Qty: 30 TABLET | Refills: 0 | Status: SHIPPED | OUTPATIENT
Start: 2021-08-13 | End: 2021-12-16 | Stop reason: SDUPTHER

## 2021-08-23 DIAGNOSIS — I10 BENIGN ESSENTIAL HYPERTENSION: ICD-10-CM

## 2021-08-23 DIAGNOSIS — I10 ESSENTIAL HYPERTENSION: ICD-10-CM

## 2021-08-23 RX ORDER — ATENOLOL 25 MG/1
25 TABLET ORAL DAILY
Qty: 90 TABLET | Refills: 1 | Status: SHIPPED | OUTPATIENT
Start: 2021-08-23 | End: 2022-03-04 | Stop reason: SDUPTHER

## 2021-09-24 ENCOUNTER — CLINICAL SUPPORT (OUTPATIENT)
Dept: FAMILY MEDICINE CLINIC | Facility: CLINIC | Age: 66
End: 2021-09-24

## 2021-09-24 DIAGNOSIS — Z20.822 EXPOSURE TO COVID-19 VIRUS: Primary | ICD-10-CM

## 2021-09-24 PROCEDURE — U0005 INFEC AGEN DETEC AMPLI PROBE: HCPCS | Performed by: FAMILY MEDICINE

## 2021-09-24 PROCEDURE — U0003 INFECTIOUS AGENT DETECTION BY NUCLEIC ACID (DNA OR RNA); SEVERE ACUTE RESPIRATORY SYNDROME CORONAVIRUS 2 (SARS-COV-2) (CORONAVIRUS DISEASE [COVID-19]), AMPLIFIED PROBE TECHNIQUE, MAKING USE OF HIGH THROUGHPUT TECHNOLOGIES AS DESCRIBED BY CMS-2020-01-R: HCPCS | Performed by: FAMILY MEDICINE

## 2021-09-25 LAB — SARS-COV-2 RNA RESP QL NAA+PROBE: NEGATIVE

## 2021-10-19 ENCOUNTER — CLINICAL SUPPORT (OUTPATIENT)
Dept: FAMILY MEDICINE CLINIC | Facility: CLINIC | Age: 66
End: 2021-10-19

## 2021-10-19 DIAGNOSIS — Z11.52 ENCOUNTER FOR SCREENING FOR COVID-19: Primary | ICD-10-CM

## 2021-10-19 PROCEDURE — U0003 INFECTIOUS AGENT DETECTION BY NUCLEIC ACID (DNA OR RNA); SEVERE ACUTE RESPIRATORY SYNDROME CORONAVIRUS 2 (SARS-COV-2) (CORONAVIRUS DISEASE [COVID-19]), AMPLIFIED PROBE TECHNIQUE, MAKING USE OF HIGH THROUGHPUT TECHNOLOGIES AS DESCRIBED BY CMS-2020-01-R: HCPCS | Performed by: FAMILY MEDICINE

## 2021-10-19 PROCEDURE — U0005 INFEC AGEN DETEC AMPLI PROBE: HCPCS | Performed by: FAMILY MEDICINE

## 2021-10-20 LAB — SARS-COV-2 RNA RESP QL NAA+PROBE: NEGATIVE

## 2021-11-29 ENCOUNTER — EVALUATION (OUTPATIENT)
Dept: PHYSICAL THERAPY | Facility: CLINIC | Age: 66
End: 2021-11-29
Payer: MEDICARE

## 2021-11-29 DIAGNOSIS — M54.2 NECK PAIN: Primary | ICD-10-CM

## 2021-11-29 DIAGNOSIS — S49.91XS INJURY OF RIGHT SHOULDER, SEQUELA: ICD-10-CM

## 2021-11-29 PROCEDURE — 97161 PT EVAL LOW COMPLEX 20 MIN: CPT | Performed by: PHYSICAL THERAPIST

## 2021-11-29 PROCEDURE — 97110 THERAPEUTIC EXERCISES: CPT | Performed by: PHYSICAL THERAPIST

## 2021-12-02 ENCOUNTER — APPOINTMENT (OUTPATIENT)
Dept: PHYSICAL THERAPY | Facility: CLINIC | Age: 66
End: 2021-12-02
Payer: MEDICARE

## 2021-12-06 ENCOUNTER — OFFICE VISIT (OUTPATIENT)
Dept: PHYSICAL THERAPY | Facility: CLINIC | Age: 66
End: 2021-12-06
Payer: MEDICARE

## 2021-12-06 DIAGNOSIS — M54.2 NECK PAIN: Primary | ICD-10-CM

## 2021-12-06 DIAGNOSIS — S49.91XS INJURY OF RIGHT SHOULDER, SEQUELA: ICD-10-CM

## 2021-12-06 PROCEDURE — 97110 THERAPEUTIC EXERCISES: CPT | Performed by: PHYSICAL THERAPIST

## 2021-12-06 PROCEDURE — 97140 MANUAL THERAPY 1/> REGIONS: CPT | Performed by: PHYSICAL THERAPIST

## 2021-12-09 ENCOUNTER — OFFICE VISIT (OUTPATIENT)
Dept: PHYSICAL THERAPY | Facility: CLINIC | Age: 66
End: 2021-12-09
Payer: MEDICARE

## 2021-12-09 DIAGNOSIS — M54.2 NECK PAIN: Primary | ICD-10-CM

## 2021-12-09 DIAGNOSIS — S49.91XS INJURY OF RIGHT SHOULDER, SEQUELA: ICD-10-CM

## 2021-12-09 PROCEDURE — 97140 MANUAL THERAPY 1/> REGIONS: CPT | Performed by: PHYSICAL THERAPIST

## 2021-12-09 PROCEDURE — 97110 THERAPEUTIC EXERCISES: CPT | Performed by: PHYSICAL THERAPIST

## 2021-12-13 ENCOUNTER — OFFICE VISIT (OUTPATIENT)
Dept: PHYSICAL THERAPY | Facility: CLINIC | Age: 66
End: 2021-12-13
Payer: MEDICARE

## 2021-12-13 DIAGNOSIS — S49.91XS INJURY OF RIGHT SHOULDER, SEQUELA: ICD-10-CM

## 2021-12-13 DIAGNOSIS — M54.2 NECK PAIN: Primary | ICD-10-CM

## 2021-12-13 PROCEDURE — 97140 MANUAL THERAPY 1/> REGIONS: CPT | Performed by: PHYSICAL THERAPIST

## 2021-12-13 PROCEDURE — 97110 THERAPEUTIC EXERCISES: CPT | Performed by: PHYSICAL THERAPIST

## 2021-12-16 DIAGNOSIS — F41.9 ANXIETY: ICD-10-CM

## 2021-12-16 RX ORDER — LORAZEPAM 0.5 MG/1
0.5 TABLET ORAL DAILY PRN
Qty: 30 TABLET | Refills: 0 | Status: SHIPPED | OUTPATIENT
Start: 2021-12-16 | End: 2022-04-18 | Stop reason: SDUPTHER

## 2021-12-20 ENCOUNTER — OFFICE VISIT (OUTPATIENT)
Dept: PHYSICAL THERAPY | Facility: CLINIC | Age: 66
End: 2021-12-20
Payer: MEDICARE

## 2021-12-20 DIAGNOSIS — S49.91XS INJURY OF RIGHT SHOULDER, SEQUELA: ICD-10-CM

## 2021-12-20 DIAGNOSIS — M54.2 NECK PAIN: Primary | ICD-10-CM

## 2021-12-20 PROCEDURE — 97110 THERAPEUTIC EXERCISES: CPT | Performed by: PHYSICAL THERAPIST

## 2021-12-20 PROCEDURE — 97140 MANUAL THERAPY 1/> REGIONS: CPT | Performed by: PHYSICAL THERAPIST

## 2022-01-17 ENCOUNTER — TELEPHONE (OUTPATIENT)
Dept: OBGYN CLINIC | Facility: HOSPITAL | Age: 67
End: 2022-01-17

## 2022-01-17 NOTE — TELEPHONE ENCOUNTER
I received a Care Request from patient to schedule for:    Where Does it Hurt? Neck and shoulder   Are you considering joint replacement? No   Are you seeking a second opinion? No  If yes, who is your doctor? I lvm to cb to schedule

## 2022-01-31 ENCOUNTER — OFFICE VISIT (OUTPATIENT)
Dept: OBGYN CLINIC | Facility: CLINIC | Age: 67
End: 2022-01-31
Payer: MEDICARE

## 2022-01-31 VITALS
SYSTOLIC BLOOD PRESSURE: 126 MMHG | HEART RATE: 69 BPM | BODY MASS INDEX: 25.66 KG/M2 | WEIGHT: 154 LBS | DIASTOLIC BLOOD PRESSURE: 85 MMHG | HEIGHT: 65 IN

## 2022-01-31 DIAGNOSIS — M54.2 NECK PAIN: Primary | ICD-10-CM

## 2022-01-31 PROCEDURE — 99214 OFFICE O/P EST MOD 30 MIN: CPT | Performed by: FAMILY MEDICINE

## 2022-01-31 RX ORDER — LORAZEPAM 0.5 MG/1
TABLET ORAL
COMMUNITY
Start: 2021-12-16 | End: 2022-08-04 | Stop reason: SDUPTHER

## 2022-01-31 RX ORDER — CYCLOBENZAPRINE HCL 10 MG
10 TABLET ORAL
Qty: 30 TABLET | Refills: 1 | Status: SHIPPED | OUTPATIENT
Start: 2022-01-31 | End: 2022-04-25

## 2022-01-31 NOTE — PROGRESS NOTES
1  Neck pain  Ambulatory referral to Pain Management    cyclobenzaprine (FLEXERIL) 10 mg tablet    MRI cervical spine wo contrast     Orders Placed This Encounter   Procedures    MRI cervical spine wo contrast    Ambulatory referral to Pain Management        IMAGING STUDIES: (I personally reviewed images in PACS and report):  Xray cervical 4/12/21:  Mild degenerative disc disease C7-T1 and C5-C6 levels  PAST REPORTS:       ASSESSMENT/PLAN:  Neck Pain chronic  Failed PT December Ongoing x 1 year    Repeat X-ray next visit: None    Return for Follow-up with specialist     Patient Instructions   Up to 20% of the population suffers from neck pain, and of the causes for neck pain, arthritis is the most common  The neck bones are known as vertebrae and number from C1 (cervical 1) to C7  The most common sites of "degenerative changes" (arthritis) are "betwween C4 & C7 " Nerves come out between each of these vertebrae and are labeled the left and right nerves  For example, the left C8 nerve is responsible for left sided finger flexion and sesnation of the pinky small finger  Some nerves higher up such as C3-C5 control other basic functions such as breathing  Causes of neck pain include neck strain which is an injury to muscles of the neck that results in neck muscles and trapezius tenderness, does not have any pinched nerve arm pain or numbness (known as radiculopathy), and lasts up to 6 weeks  Another name for neck strain is whiplash injury which often occurs in car accidents and can range from mild stiffness with some loss of movement to pinched nerves and even fractures of the vertebrae in severe cases  Arthritis of the neck is called Spondylosis and comes in different types  Facet joint arthritis describes loss of cushion between the small joints between the vertebrae and can cause chronic pain but not all people with spondylosis have persistent pain       The large joints between the vertebrae known as discs can also become arthritic as defined by loss of cushion space between these vertebrae known as DDD (degenerative disc disease)  May people have DDD with some sutdies showing up to 81% of people on average of 38yo however most do not have any pain until years later  Pinched nerves of the spine can occur in two ways  Radiculopathy is pinching of the nerves that branch off of the spinal cord at each level and usually cause neck pain and symptoms of pain or numbness associated with the upper extremity fed by that nerve  Spinal stenosis, in contrast, is a narrowing of the spinal canal and causing pinching of the spinal cord which can cause symptoms throughout the body including lower leg weakness, walking problems, and bladder and bowel dysfunction  Most neck strains do not require xrays however we generally gather more information with xrays in people who have history of traumatic event or have pain that does not resolve after 6 weeks of treatment  We reserve MRIs for red flags symptoms (fevers, risks of cancer such as unintentional weight loss, night sweats, prior history of cancer, muscle weakness) and for neck pain that does not resolve after 6 weeks of conservative management  (esvintoted maurer 10/2018)    Treatment of neck pain includes special attention to ergonomics or sitting posture to avoid hunching and bad sleeping habits, physical therapy to strengthen muscles of the neck, and medications  Pain relievers such as tylenol are mainstays of treatment as well as anti-inflammatories such as naproxen (aleve), and ibuprofen (advil)  Muscle relaxer may be used as well and are reserved for use at night-time; however, they may be used during the day and if so, then the lowest possible does is used to avoid drowsiness and patients are instructed to refrain from driving or operating machinery due to risk of injury       Spinal manipulation as performed by a chiropractor or an osteopathic physician (DO) who performs manipulation as a part of practice may also be used if not improving with first-line treatments such as home exercise, medications, and physical therapy  For pain not responding to 6 weeks of conservative measures as outlined above, we generally order and MRI for further evaluation and refer to pain management for consideration of advance procedures such as neck injections (uptodate Meaghan Cassette 10/2018)  risks of muscle relaxant medications (examples: flexeril, cyclobenzaprin, robaxin, methocarbamol, zanaflex, tizanidine)  include dizziness and drowsiness which may lead to falls or motor vehicle accidents and significant injury to self or others  Due to this, I recommend against taking muscle relaxants over 72years of age or if considered frail or have multiple chronic medical conditions, against driving or operating machinery, and against caring for children while taking muscle relaxants  There are also risks of mixing muscle relaxants with other medications such as benadryl, diphenhydramine, sleeping pills, opiate pain pills or other narcotic pain pills alcohol, benzodiazepines such as xanax (alprazolam), ativan (lorazepam), clonazepam, valium (diazepam) and other sedating medications including worsening drowsiness and risk of fall or motor vehicle accident, respiratory depression or trouble breathing, and death  Recommend chiropractic trial    [facility]   [address] [place] [address] Road  [phone]    [facility]   [address]  [phone]      Educated risks of mixing NSAIDS ( (non-steroidal anti-inflammatory pills including advil, ibuprofen, motrin, meloxicam, celecoxib, aleve, naproxen, and aspirin containing products) with each other or with steroids (such as prednisone, medrol)  Explained risks of mixing these medications including stomach ulcer, severe internal bleeding, and kidney failure  Instructed not to take NSAIDS if have history of stomach ulcers, kidney issues, or uncontrolled hypertension  Instructed patient to use only one brand as prescribed  For naproxen, a maximum of 500 mg per dose every 12 hours and no more than two doses or 1,000mg per day  For Ibuprofen, a maximum of 800 mg per dose every 6 hours but no more than 3 doses or 2,400 mg per day  Never take these medications together  Never take these medications the same day  For severe pain and only if you have no liver problems, you may add Tylenol (also known as acetaminophen) maximum of 1,000  Mg per dose every 6 hours but no more 3 doses or 3,000 mg per day  Do not mix tylenol with nyquil  Recommend ween off of nyquil by substituting with benadryl at bedtime  DO NOT TAKE BENADRYL WITH MUSCLE RELAXER FLEXERIL (CYCLOBENZAPRINE)  Patient expressed understanding and agreed to plan  risks of muscle relaxant medications (examples: flexeril, cyclobenzaprin, robaxin, methocarbamol, zanaflex, tizanidine)  include dizziness and drowsiness which may lead to falls or motor vehicle accidents and significant injury to self or others  Due to this, I recommend against taking muscle relaxants over 72years of age or if considered frail or have multiple chronic medical conditions, against driving or operating machinery, and against caring for children while taking muscle relaxants      There are also risks of mixing muscle relaxants with other medications such as benadryl, diphenhydramine, sleeping pills, opiate pain pills or other narcotic pain pills alcohol, benzodiazepines such as xanax (alprazolam), ativan (lorazepam), clonazepam, valium (diazepam) and other sedating medications including worsening drowsiness and risk of fall or motor vehicle accident, respiratory depression or trouble breathing, and death                __________________________________________________________________________    CHIEF COMPLAINT:  Pain of the neck    HPI:  Rebecca Betancourt is a 77 y o  female  who presents for       Visit 01/31/2022:  Evaluation neck pain ongoing for approximately 1 year  She had fallen that time has had neck pain since  She did not seek treatment until a few months later when she had a x-ray in April of 2021 which showed mild arthritis  Subsequently she continued to have intermittent pain that then 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  Recently attended physical therapy in December but no significant improvement  Currently feels 25% of her usual baseline  Radiates to the posterior shoulder and sometimes anterior  Denies any pain down the arms  Denies any numbness or tingling in the hands  Review of Systems   Constitutional: Negative for chills, fever and unexpected weight change  HENT: Negative for hearing loss, nosebleeds and sore throat  Eyes: Negative for pain, redness and visual disturbance  Respiratory: Negative for cough, shortness of breath and wheezing  Cardiovascular: Negative for chest pain, palpitations and leg swelling  Gastrointestinal: Negative for abdominal distention, nausea and vomiting  Endocrine: Negative for polydipsia and polyuria  Genitourinary: Negative for dysuria and hematuria  Skin: Negative for rash and wound  Neurological: Negative for dizziness, numbness and headaches  Psychiatric/Behavioral: Negative for decreased concentration and suicidal ideas           Following history reviewed and update:    Past Medical History:   Diagnosis Date    Disease of thyroid gland     Hypertension      Past Surgical History:   Procedure Laterality Date    BREAST LUMPECTOMY Left     Benign    THYROID SURGERY      Total Thyroidectomy     Social History   Social History     Substance and Sexual Activity   Alcohol Use Yes    Alcohol/week: 14 0 standard drinks    Types: 14 Glasses of wine per week    Comment: Social Social History     Substance and Sexual Activity   Drug Use No     Social History     Tobacco Use   Smoking Status Former Smoker    Packs/day: 1 00    Years: 30 00    Pack years: 30 00    Types: Cigarettes    Quit date: 0    Years since quittin 0   Smokeless Tobacco Never Used     Family History   Problem Relation Age of Onset    Arthritis Mother     Lung cancer Mother     Lung cancer Father     COPD Brother     Diabetes Maternal Grandmother         Mellitus    Diabetes Paternal Grandmother         Mellitus     No Known Allergies       Physical Exam  /85 (BP Location: Right arm, Patient Position: Sitting, Cuff Size: Adult)   Pulse 69   Ht 5' 5" (1 651 m)   Wt 69 9 kg (154 lb)   LMP  (LMP Unknown)   BMI 25 63 kg/m²     Constitutional:  see vital signs  Gen: well-developed, normocephalic/atraumatic, well-groomed  Eyes: No inflammation or discharge of conjunctiva or lids; sclera clear   Pharynx: no inflammation, lesion, or mass of lips  Neck: supple, no masses, non-distended  MSK: no inflammation, lesion, mass, or clubbing of nails and digits except for other than mentioned below  SKIN: no visible rashes or skin lesions  Pulmonary/Chest: Effort normal  No respiratory distress     NEURO: cranial nerves grossly intact  PSYCH:  Alert and oriented to person, place, and time; recent and remote memory intact; mood normal, no depression, anxiety, or agitation, judgment and insight good and intact     Ortho Exam    Cervical  ROM: diminished right  Midline spinous process tenderness: None  Muscular Tenderness: paraspinal bilateral cervical  Sensation UE Bilateral:  C5: normal  C6: normal  C7: normal  C8: normal  T1: normal  Strength UE: 5/5 elbow, wrist, fingers bilateral        __________________________________________________________________________  Procedures

## 2022-01-31 NOTE — PATIENT INSTRUCTIONS
Up to 20% of the population suffers from neck pain, and of the causes for neck pain, arthritis is the most common  The neck bones are known as vertebrae and number from C1 (cervical 1) to C7  The most common sites of "degenerative changes" (arthritis) are "betwween C4 & C7 " Nerves come out between each of these vertebrae and are labeled the left and right nerves  For example, the left C8 nerve is responsible for left sided finger flexion and sesnation of the pinky small finger  Some nerves higher up such as C3-C5 control other basic functions such as breathing  Causes of neck pain include neck strain which is an injury to muscles of the neck that results in neck muscles and trapezius tenderness, does not have any pinched nerve arm pain or numbness (known as radiculopathy), and lasts up to 6 weeks  Another name for neck strain is whiplash injury which often occurs in car accidents and can range from mild stiffness with some loss of movement to pinched nerves and even fractures of the vertebrae in severe cases  Arthritis of the neck is called Spondylosis and comes in different types  Facet joint arthritis describes loss of cushion between the small joints between the vertebrae and can cause chronic pain but not all people with spondylosis have persistent pain  The large joints between the vertebrae known as discs can also become arthritic as defined by loss of cushion space between these vertebrae known as DDD (degenerative disc disease)  May people have DDD with some sutdies showing up to 81% of people on average of 38yo however most do not have any pain until years later  Pinched nerves of the spine can occur in two ways  Radiculopathy is pinching of the nerves that branch off of the spinal cord at each level and usually cause neck pain and symptoms of pain or numbness associated with the upper extremity fed by that nerve   Spinal stenosis, in contrast, is a narrowing of the spinal canal and causing pinching of the spinal cord which can cause symptoms throughout the body including lower leg weakness, walking problems, and bladder and bowel dysfunction  Most neck strains do not require xrays however we generally gather more information with xrays in people who have history of traumatic event or have pain that does not resolve after 6 weeks of treatment  We reserve MRIs for red flags symptoms (fevers, risks of cancer such as unintentional weight loss, night sweats, prior history of cancer, muscle weakness) and for neck pain that does not resolve after 6 weeks of conservative management  (uptodate Sahron maurer 10/2018)    Treatment of neck pain includes special attention to ergonomics or sitting posture to avoid hunching and bad sleeping habits, physical therapy to strengthen muscles of the neck, and medications  Pain relievers such as tylenol are mainstays of treatment as well as anti-inflammatories such as naproxen (aleve), and ibuprofen (advil)  Muscle relaxer may be used as well and are reserved for use at night-time; however, they may be used during the day and if so, then the lowest possible does is used to avoid drowsiness and patients are instructed to refrain from driving or operating machinery due to risk of injury  Spinal manipulation as performed by a chiropractor or an osteopathic physician (DO) who performs manipulation as a part of practice may also be used if not improving with first-line treatments such as home exercise, medications, and physical therapy  For pain not responding to 6 weeks of conservative measures as outlined above, we generally order and MRI for further evaluation and refer to pain management for consideration of advance procedures such as neck injections (uptodate Sandra Trujillo 10/2018)        risks of muscle relaxant medications (examples: flexeril, cyclobenzaprin, robaxin, methocarbamol, zanaflex, tizanidine)  include dizziness and drowsiness which may lead to falls or motor vehicle accidents and significant injury to self or others  Due to this, I recommend against taking muscle relaxants over 72years of age or if considered frail or have multiple chronic medical conditions, against driving or operating machinery, and against caring for children while taking muscle relaxants  There are also risks of mixing muscle relaxants with other medications such as benadryl, diphenhydramine, sleeping pills, opiate pain pills or other narcotic pain pills alcohol, benzodiazepines such as xanax (alprazolam), ativan (lorazepam), clonazepam, valium (diazepam) and other sedating medications including worsening drowsiness and risk of fall or motor vehicle accident, respiratory depression or trouble breathing, and death  Recommend chiropractic trial    [facility]   [address], [facility], [address]  [phone]    [facility]   [address], [facility] [place], [address]  [phone]      Educated risks of mixing NSAIDS ( (non-steroidal anti-inflammatory pills including advil, ibuprofen, motrin, meloxicam, celecoxib, aleve, naproxen, and aspirin containing products) with each other or with steroids (such as prednisone, medrol)  Explained risks of mixing these medications including stomach ulcer, severe internal bleeding, and kidney failure  Instructed not to take NSAIDS if have history of stomach ulcers, kidney issues, or uncontrolled hypertension  Instructed patient to use only one brand as prescribed  For naproxen, a maximum of 500 mg per dose every 12 hours and no more than two doses or 1,000mg per day  For Ibuprofen, a maximum of 800 mg per dose every 6 hours but no more than 3 doses or 2,400 mg per day  Never take these medications together  Never take these medications the same day   For severe pain and only if you have no liver problems, you may add Tylenol (also known as acetaminophen) maximum of 1,000  Mg per dose every 6 hours but no more 3 doses or 3,000 mg per day  Do not mix tylenol with nyquil  Recommend ween off of nyquil by substituting with benadryl at bedtime  DO NOT TAKE BENADRYL WITH MUSCLE RELAXER FLEXERIL (CYCLOBENZAPRINE)  Patient expressed understanding and agreed to plan  risks of muscle relaxant medications (examples: flexeril, cyclobenzaprin, robaxin, methocarbamol, zanaflex, tizanidine)  include dizziness and drowsiness which may lead to falls or motor vehicle accidents and significant injury to self or others  Due to this, I recommend against taking muscle relaxants over 72years of age or if considered frail or have multiple chronic medical conditions, against driving or operating machinery, and against caring for children while taking muscle relaxants  There are also risks of mixing muscle relaxants with other medications such as benadryl, diphenhydramine, sleeping pills, opiate pain pills or other narcotic pain pills alcohol, benzodiazepines such as xanax (alprazolam), ativan (lorazepam), clonazepam, valium (diazepam) and other sedating medications including worsening drowsiness and risk of fall or motor vehicle accident, respiratory depression or trouble breathing, and death

## 2022-02-09 ENCOUNTER — HOSPITAL ENCOUNTER (OUTPATIENT)
Dept: MRI IMAGING | Facility: HOSPITAL | Age: 67
Discharge: HOME | End: 2022-02-09
Attending: FAMILY MEDICINE
Payer: MEDICARE

## 2022-02-09 DIAGNOSIS — M54.2 NECK PAIN: ICD-10-CM

## 2022-02-09 PROCEDURE — G1004 CDSM NDSC: HCPCS

## 2022-02-09 PROCEDURE — 72141 MRI NECK SPINE W/O DYE: CPT

## 2022-02-16 ENCOUNTER — CONSULT (OUTPATIENT)
Dept: PAIN MEDICINE | Facility: CLINIC | Age: 67
End: 2022-02-16
Payer: MEDICARE

## 2022-02-16 VITALS
BODY MASS INDEX: 25.99 KG/M2 | HEART RATE: 56 BPM | SYSTOLIC BLOOD PRESSURE: 142 MMHG | DIASTOLIC BLOOD PRESSURE: 86 MMHG | WEIGHT: 156 LBS | HEIGHT: 65 IN | TEMPERATURE: 97.8 F

## 2022-02-16 DIAGNOSIS — M47.812 CERVICAL SPONDYLOSIS: Primary | ICD-10-CM

## 2022-02-16 DIAGNOSIS — M54.2 NECK PAIN: ICD-10-CM

## 2022-02-16 PROCEDURE — 99204 OFFICE O/P NEW MOD 45 MIN: CPT | Performed by: ANESTHESIOLOGY

## 2022-02-16 NOTE — PROGRESS NOTES
Assessment  1  Cervical spondylosis    2  Neck pain        Plan    Melba's neck pain persists despite time, relative rest, activity modification and therapy  Based on the patient's symptoms and examination, I suspect that her pain is being generated by the cervical facet joints  The facet joints are only one of several possible cervical pain generators  Unfortunately, studies have demonstrated that history and examination alone are unreliable  I will schedule the patient for diagnostic cervical medial branch blockade using a double block paradigm  If the patient receives significant pain relief of appropriate duration with bupivacaine 0 25%, we will confirm with bupivacaine  0 75%  If the patient demonstrates appropriate response to medial branch blockade we will schedule for radiofrequency ablation of the blocked nerves to provide long-term pain relief  In the office today, we reviewed the nature of the patient's pathology in depth using  diagrams and models  I discussed the approach we would use for the medial branch block and provided literature for home review  The patient understands the risks associated with the procedure including bleeding, infection, tissue injury, allergic reaction and paralysis and provided written and verbal consent in the office today  My impressions and treatment recommendations were discussed in detail with the patient who verbalized understanding and had no further questions  Discharge instructions were provided  I personally saw and examined the patient and I agree with the above discussed plan of care  This note is created using dictation transcription  It may contain typographical errors, grammatical errors, improperly dictated words, background noise and other errors      Orders Placed This Encounter   Procedures    FL spine and pain procedure     Standing Status:   Future     Standing Expiration Date:   2/16/2026     Order Specific Question:   Reason for Exam: Answer:   right C4,5,6 MBB with 0 25 % bupiv     Order Specific Question:   Anticoagulant hold needed? Answer:   no     No orders of the defined types were placed in this encounter  Referred By: Seema Breaux III DO  History of Present Illness    Mya Guillen is a 77 y o  female with one year history of right-sided neck and shoulder pain  This occurred after a fall on February 15, 2021  She has undergone physical therapy has tried nonsteroidal anti-inflammatories but her pain persists which is moderate to severe  She has undergone osteopathic manipulation as well  She rates her pain between seven to 8/10 on the visual analog scale interfering with daily living activities  Is no typical pattern describes sharp and shooting denies any weakness of her upper lower limbs  She reports that lying down and exercise aggravates her symptoms  I have personally reviewed and/or updated the patient's past medical history, past surgical history, family history, social history, current medications, allergies, and vital signs today  Review of Systems   Constitutional: Negative for fever and unexpected weight change  HENT: Negative for trouble swallowing  Eyes: Negative for visual disturbance  Respiratory: Negative for shortness of breath and wheezing  Cardiovascular: Negative for chest pain and palpitations  Gastrointestinal: Negative for constipation, diarrhea, nausea and vomiting  Endocrine: Positive for polyuria  Negative for cold intolerance, heat intolerance and polydipsia  Genitourinary: Negative for difficulty urinating and frequency  Musculoskeletal: Negative for arthralgias, gait problem, joint swelling and myalgias  Skin: Negative for rash  Neurological: Negative for dizziness, seizures, syncope, weakness and headaches  Hematological: Does not bruise/bleed easily  Psychiatric/Behavioral: Negative for dysphoric mood     All other systems reviewed and are negative  Patient Active Problem List   Diagnosis    Postprocedural hypothyroidism    Mixed hyperlipidemia    Benign essential hypertension    Anxiety    Chronic fatigue    Encounter for screening colonoscopy    Sleep disorder    Elevated LFTs    Pain in both lower extremities    Acute pain of both knees    Chronic bilateral low back pain with bilateral sciatica    Right shoulder injury    Neck pain    Left foot pain    Chronic pain of left ankle    Concussion without loss of consciousness    BMI 25 0-25 9,adult       Past Medical History:   Diagnosis Date    Anxiety     Arthritis     Disease of thyroid gland     Hypertension        Past Surgical History:   Procedure Laterality Date    BREAST LUMPECTOMY Left     Benign    THYROID SURGERY      Total Thyroidectomy       Family History   Problem Relation Age of Onset    Arthritis Mother     Lung cancer Mother     Lung cancer Father     COPD Brother     Diabetes Maternal Grandmother         Mellitus    Diabetes Paternal Grandmother         Mellitus       Social History     Occupational History    Not on file   Tobacco Use    Smoking status: Former Smoker     Packs/day:      Years: 30      Pack years: 30      Types: Cigarettes     Quit date:      Years since quittin 1    Smokeless tobacco: Never Used   Vaping Use    Vaping Use: Never used   Substance and Sexual Activity    Alcohol use:  Yes     Alcohol/week: 14 0 standard drinks     Types: 14 Glasses of wine per week     Comment: Social    Drug use: No    Sexual activity: Not on file       Current Outpatient Medications on File Prior to Visit   Medication Sig    Ipava Thyroid 15 MG tablet Take 15 mg by mouth daily     Ipava Thyroid 60 MG tablet Take 60 mg by mouth daily     atenolol (TENORMIN) 25 mg tablet Take 1 tablet (25 mg total) by mouth daily At 8 PM    cyclobenzaprine (FLEXERIL) 10 mg tablet Take 1 tablet (10 mg total) by mouth daily at bedtime as needed for muscle spasms    LORazepam (ATIVAN) 0 5 mg tablet Take 1 tablet (0 5 mg total) by mouth daily as needed for anxiety    LORazepam (ATIVAN) 0 5 mg tablet      No current facility-administered medications on file prior to visit  No Known Allergies    Physical Exam    /86 (BP Location: Left arm, Patient Position: Sitting, Cuff Size: Standard)   Pulse 56   Temp 97 8 °F (36 6 °C)   Ht 5' 5" (1 651 m)   Wt 70 8 kg (156 lb)   LMP  (LMP Unknown)   BMI 25 96 kg/m²     Constitutional: normal, well developed, well nourished, alert, in no distress and non-toxic and no overt pain behavior  and overweight  Eyes: anicteric  HEENT: grossly intact  Neck: supple, symmetric, trachea midline and no masses   Pulmonary:even and unlabored  Cardiovascular:No edema or pitting edema present  Skin:Normal without rashes or lesions and well hydrated  Psychiatric:Mood and affect appropriate  Neurologic:Cranial Nerves II-XII grossly intact  Musculoskeletal:normal,   Inspection:  Normal station and gait  Normal cervical curves and head posture  Skin intact without erythema  No sensory loss  There is no atrophy  Palpation:  There is tenderness to palpation overlying the right greater than left cervical paraspinals, cervical facet joints  There is no tenderness over the upper trapezius muscles bilateral  No shoulder tenderness  Motor/Strength:  5/5 strength in the bilateral upper extremities  Reflexes:  equal and symmetric in the upper limbs  Sensation:   Sensation intact to light touch and pinprick in the upper limbs  Maneuvers:  Negative Spurling's maneuver  Negative Lhermitte's sign  Positive cervical facet loading on the right            Imaging  MRI CERVICAL SPINE WITHOUT CONTRAST @ 2-9-22     INDICATION: Chronic right-sided neck pain      COMPARISON:  X-ray of the cervical spine from April 12, 2021      TECHNIQUE:  Sagittal T1, sagittal T2, sagittal inversion recovery, axial T2, axial  2D merge  Imaging performed on 1 5T MRI    IMAGE QUALITY:  Diagnostic     FINDINGS:     ALIGNMENT:  Straightening of the cervical spine  No subluxation  Maintained vertebral body stature      MARROW SIGNAL:  Normal marrow signal is identified within the visualized bony structures  No discrete marrow lesion      CERVICAL AND VISUALIZED THORACIC CORD:  Normal signal within the visualized cord      PREVERTEBRAL AND PARASPINAL SOFT TISSUES:  The thyroid gland is not identified, presumably surgically absent      VISUALIZED POSTERIOR FOSSA:  The visualized posterior fossa demonstrates no abnormal signal      CERVICAL DISC SPACES:  Mild disc space narrowing at C5 upon C6      C2-C3:  Normal      C3-C4:  Small central protrusion  Moderate to severe right facet arthropathy with ipsilateral foraminal stenosis      C4-C5:  Tiny central herniation without spinal canal stenosis  Left greater than right facet arthropathy with mild left foraminal stenosis      C5-C6:  Posterior disc osteophyte complex with left facet arthropathy and mild bilateral uncovertebral hypertrophy  No significant spinal canal or foraminal stenosis      C6-C7:  Mild disc bulging without spinal canal or foraminal stenosis      C7-T1:  Normal      UPPER THORACIC DISC SPACES:  Normal      IMPRESSION:     Mild noncompressive degenerative discogenic disease as discussed above      Multilevel facet arthropathy, most notably on the right at C3-C4, on the left at C4-C5, on the left at C5-C6      No cord signal abnormality      CERVICAL SPINE @  4-12-21     INDICATION:   S49  91XS: Unspecified injury of right shoulder and upper arm, sequela  M54 2: Cervicalgia  M79 672: Pain in left foot  M25 572: Pain in left ankle and joints of left foot  G89 29: Other chronic pain  W19  XXXS: Unspecified fall, sequela      COMPARISON:  None     VIEWS:  XR SPINE CERVICAL 2 OR 3 VW INJURY         FINDINGS:     No fracture or subluxation       Alignment is anatomic     Minimal disc space narrowing is seen at the C7-T1 level  Additional minimal narrowing is seen at C5-C6       The prevertebral soft tissues are within normal limits        The lung apices are clear      IMPRESSION:     Mild degenerative disc disease C7-T1 and C5-C6 levels  I have personally reviewed pertinent films in PACS and my interpretation is Multilevel cervical spondylosis

## 2022-02-16 NOTE — PATIENT INSTRUCTIONS
Cervical Facet Block   WHAT YOU NEED TO KNOW:   A cervical facet block is a procedure to inject medicine at the facet joints in your cervical (neck) spine  Facet joints are found at the back of each vertebrae  HOW TO PREPARE:   The week before your procedure:   · Arrange to have someone drive you home after your procedure  · Tell your healthcare provider about all the medicines you currently take  He or she will tell you if you need to stop any medicine before the procedure, and when to stop  He or she will tell you which medicines to take or not take on the day of the procedure  · Tell your provider about all your allergies  Tell him or her if you had an allergic reaction to anesthesia, antibiotics, or contrast liquid  · You may need to have blood and urine tests  Tests such as x-rays, a CT scan, or an MRI may also be done  The night before your procedure: You may be told not to eat or drink anything after midnight  The day of your procedure:   · Take only the medicines your healthcare provider told you to take  · You or a close family member will be asked to sign a legal document called a consent form  It gives healthcare providers permission to do the procedure or surgery  It also explains the problems that may happen, and your choices  Make sure all your questions are answered before you sign this form  · Healthcare providers may insert an intravenous tube (IV) into your vein  A vein in the arm is usually chosen  Through the IV tube, you may be given liquids and medicine  · An anesthesiologist will talk to you before your surgery  You may need medicine to keep you asleep or numb an area of your body during surgery  Tell healthcare providers if you or anyone in your family has had a problem with anesthesia in the past     WHAT WILL HAPPEN:   What will happen:   · You will lie on your stomach, with your head and body slightly turned to the side   Your healthcare provider will insert a thin needle near your cervical spine and into the facet joint  He or she will use an x-ray with contrast liquid or a CT scan to help guide the needle  · Your provider will place the needle tip inside or just outside the facet joint and inject the medicine  The medicine may include steroids and anesthesia  Your healthcare provider may inject medicine into more than one area  · Bandages will be placed over the areas where the needles were inserted  After your procedure: You will be taken to a recovery room to rest  Healthcare providers will watch you closely for any problems  Do not get out of bed until your healthcare provider says it is okay  When healthcare providers see that you are okay, you may be able to go home  · Bandages will cover the procedure area  The bandages keep the area clean and dry to prevent infection  A healthcare provider may remove the bandages soon after your procedure to check the injection sites  · Medicines may be given to treat pain, swelling, or fever, or to prevent an infection  CONTACT YOUR HEALTHCARE PROVIDER IF:   · You have a fever  · You have a skin infection or an infected wound on or near the back of your neck  · You have questions or concerns about your procedure  RISKS:   You may have bleeding at the injection site  Nerves, blood vessels, ligaments, muscles, and bones near your spine may be damaged  The medicine may spread past the facet joint and cause numbness in other areas  You may have trouble breathing  Even after you have this procedure, you may still have shoulder or back pain  You may also develop a headache from the procedure  CARE AGREEMENT:   You have the right to help plan your care  Learn about your health condition and how it may be treated  Discuss treatment options with your healthcare providers to decide what care you want to receive  You always have the right to refuse treatment     © Copyright [employer] 2021 Information is for End User's use only and may not be sold, redistributed or otherwise used for commercial purposes  All illustrations and images included in CareNotes® are the copyrighted property of A D A M [employer] Inc  or [employer]  The above information is an  only  It is not intended as medical advice for individual conditions or treatments  Talk to your doctor, nurse or pharmacist before following any medical regimen to see if it is safe and effective for you

## 2022-02-28 ENCOUNTER — HOSPITAL ENCOUNTER (OUTPATIENT)
Dept: RADIOLOGY | Facility: CLINIC | Age: 67
Discharge: HOME | End: 2022-02-28
Attending: ANESTHESIOLOGY
Payer: MEDICARE

## 2022-02-28 VITALS
HEART RATE: 62 BPM | SYSTOLIC BLOOD PRESSURE: 162 MMHG | OXYGEN SATURATION: 97 % | DIASTOLIC BLOOD PRESSURE: 87 MMHG | RESPIRATION RATE: 20 BRPM | TEMPERATURE: 97.2 F

## 2022-02-28 DIAGNOSIS — M54.2 NECK PAIN: ICD-10-CM

## 2022-02-28 DIAGNOSIS — M47.812 CERVICAL SPONDYLOSIS: ICD-10-CM

## 2022-02-28 PROCEDURE — 64490 INJ PARAVERT F JNT C/T 1 LEV: CPT | Performed by: ANESTHESIOLOGY

## 2022-02-28 PROCEDURE — 64491 INJ PARAVERT F JNT C/T 2 LEV: CPT | Performed by: ANESTHESIOLOGY

## 2022-02-28 RX ORDER — BUPIVACAINE HCL/PF 2.5 MG/ML
10 VIAL (ML) INJECTION ONCE
Status: COMPLETED | OUTPATIENT
Start: 2022-02-28 | End: 2022-02-28

## 2022-02-28 RX ADMIN — BUPIVACAINE HYDROCHLORIDE 3 ML: 2.5 INJECTION, SOLUTION EPIDURAL; INFILTRATION; INTRACAUDAL at 10:25

## 2022-02-28 NOTE — DISCHARGE INSTRUCTIONS
ACTIVITY  · Please do activities that will bring on the normal pain that we are rating  For example, if vacuuming or walking increases the pain, do that  This will give the most accurate response to the diary  · You may shower, but no tub baths today, or applied heat  CARE OF THE INJECTION SITE  · This area may be numb for several hours after the injection  · [facility]ify [facility] if you have any of the following:  redness, drainage, swelling, or fever above 100°F     SPECIAL INSTRUCTIONS  · Please return the MBB diary to our office by mail, fax, or drop it off  MEDICATIONS  · Please do not take any break through or short acting pain medications for 8 hours after the block  · Continue to take all routine medications  · Our office may have instructed you to hold some medications  As no general anesthesia was used in today's procedure, you should not experience any side effects related to anesthesia  If you have a problem specifically related to your procedure, please call our office at [phone]  Problems not related to your procedure should be directed to your primary care physician

## 2022-02-28 NOTE — H&P
History of Present Illness: The patient is a 77 y o  female who presents with complaints of neck pain      Patient Active Problem List   Diagnosis    Postprocedural hypothyroidism    Mixed hyperlipidemia    Benign essential hypertension    Anxiety    Chronic fatigue    Encounter for screening colonoscopy    Sleep disorder    Elevated LFTs    Pain in both lower extremities    Acute pain of both knees    Chronic bilateral low back pain with bilateral sciatica    Right shoulder injury    Neck pain    Left foot pain    Chronic pain of left ankle    Concussion without loss of consciousness    BMI 25 0-25 9,adult    Cervical spondylosis       Past Medical History:   Diagnosis Date    Anxiety     Arthritis     Disease of thyroid gland     Hypertension        Past Surgical History:   Procedure Laterality Date    BREAST LUMPECTOMY Left     Benign    THYROID SURGERY      Total Thyroidectomy         Current Outpatient Medications:     [place] Thyroid 15 MG tablet, Take 15 mg by mouth daily , Disp: , Rfl:     [place] Thyroid 60 MG tablet, Take 60 mg by mouth daily , Disp: , Rfl:     atenolol (TENORMIN) 25 mg tablet, Take 1 tablet (25 mg total) by mouth daily At 8 PM, Disp: 90 tablet, Rfl: 1    cyclobenzaprine (FLEXERIL) 10 mg tablet, Take 1 tablet (10 mg total) by mouth daily at bedtime as needed for muscle spasms, Disp: 30 tablet, Rfl: 1    LORazepam (ATIVAN) 0 5 mg tablet, Take 1 tablet (0 5 mg total) by mouth daily as needed for anxiety, Disp: 30 tablet, Rfl: 0    LORazepam (ATIVAN) 0 5 mg tablet, , Disp: , Rfl:     Current Facility-Administered Medications:     bupivacaine (PF) (MARCAINE) 0 25 % injection 10 mL, 10 mL, Perineural, Once, Kenny Schulz, DO    No Known Allergies    Physical Exam:   General: Awake, Alert, Oriented x 3, Mood and affect appropriate  Respiratory: Respirations even and unlabored  Cardiovascular: Peripheral pulses intact; no edema  Musculoskeletal Exam:  Decreased range of motion cervical spine    ASA Score: II         Assessment:   1  Neck pain    2   Cervical spondylosis        Plan: right C4,5,6 MBB with 0 25 % bupiv

## 2022-03-04 DIAGNOSIS — I10 ESSENTIAL HYPERTENSION: ICD-10-CM

## 2022-03-04 DIAGNOSIS — I10 BENIGN ESSENTIAL HYPERTENSION: ICD-10-CM

## 2022-03-04 RX ORDER — ATENOLOL 25 MG/1
25 TABLET ORAL DAILY
Qty: 90 TABLET | Refills: 1 | Status: SHIPPED | OUTPATIENT
Start: 2022-03-04

## 2022-03-11 ENCOUNTER — HOSPITAL ENCOUNTER (OUTPATIENT)
Dept: RADIOLOGY | Facility: CLINIC | Age: 67
Discharge: HOME | End: 2022-03-11
Attending: ANESTHESIOLOGY | Admitting: ANESTHESIOLOGY
Payer: MEDICARE

## 2022-03-11 VITALS
HEART RATE: 60 BPM | DIASTOLIC BLOOD PRESSURE: 80 MMHG | SYSTOLIC BLOOD PRESSURE: 140 MMHG | OXYGEN SATURATION: 94 % | RESPIRATION RATE: 20 BRPM | TEMPERATURE: 97.5 F

## 2022-03-11 DIAGNOSIS — M47.812 CERVICAL SPONDYLOSIS: ICD-10-CM

## 2022-03-11 PROCEDURE — 64491 INJ PARAVERT F JNT C/T 2 LEV: CPT | Performed by: ANESTHESIOLOGY

## 2022-03-11 PROCEDURE — 64490 INJ PARAVERT F JNT C/T 1 LEV: CPT | Performed by: ANESTHESIOLOGY

## 2022-03-11 RX ORDER — BUPIVACAINE HYDROCHLORIDE 7.5 MG/ML
10 INJECTION, SOLUTION EPIDURAL; RETROBULBAR ONCE
Status: COMPLETED | OUTPATIENT
Start: 2022-03-11 | End: 2022-03-11

## 2022-03-11 RX ADMIN — BUPIVACAINE HYDROCHLORIDE 3.5 ML: 7.5 INJECTION, SOLUTION EPIDURAL; RETROBULBAR at 09:44

## 2022-03-11 NOTE — H&P
History of Present Illness: The patient is a 77 y o  female who presents with complaints of neck pain  Patient Active Problem List   Diagnosis    Postprocedural hypothyroidism    Mixed hyperlipidemia    Benign essential hypertension    Anxiety    Chronic fatigue    Encounter for screening colonoscopy    Sleep disorder    Elevated LFTs    Pain in both lower extremities    Acute pain of both knees    Chronic bilateral low back pain with bilateral sciatica    Right shoulder injury    Neck pain    Left foot pain    Chronic pain of left ankle    Concussion without loss of consciousness    BMI 25 0-25 9,adult    Cervical spondylosis       Past Medical History:   Diagnosis Date    Anxiety     Arthritis     Disease of thyroid gland     Hypertension        Past Surgical History:   Procedure Laterality Date    BREAST LUMPECTOMY Left     Benign    THYROID SURGERY      Total Thyroidectomy         Current Outpatient Medications:     [place] Thyroid 15 MG tablet, Take 15 mg by mouth daily , Disp: , Rfl:     [place] Thyroid 60 MG tablet, Take 60 mg by mouth daily , Disp: , Rfl:     atenolol (TENORMIN) 25 mg tablet, Take 1 tablet (25 mg total) by mouth daily At 8 PM, Disp: 90 tablet, Rfl: 1    cyclobenzaprine (FLEXERIL) 10 mg tablet, Take 1 tablet (10 mg total) by mouth daily at bedtime as needed for muscle spasms, Disp: 30 tablet, Rfl: 1    LORazepam (ATIVAN) 0 5 mg tablet, Take 1 tablet (0 5 mg total) by mouth daily as needed for anxiety, Disp: 30 tablet, Rfl: 0    LORazepam (ATIVAN) 0 5 mg tablet, , Disp: , Rfl:     No Known Allergies    Physical Exam:   General: Awake, Alert, Oriented x 3, Mood and affect appropriate  Respiratory: Respirations even and unlabored  Cardiovascular: Peripheral pulses intact; no edema  Musculoskeletal Exam:  Decreased range of motion cervical spine    ASA Score: II         Assessment:   1   Cervical spondylosis        Plan: Right C4,5,6 MBB 0 75 [facility] pain Diary

## 2022-03-28 ENCOUNTER — TELEPHONE (OUTPATIENT)
Dept: PAIN MEDICINE | Facility: MEDICAL CENTER | Age: 67
End: 2022-03-28

## 2022-03-28 ENCOUNTER — HOSPITAL ENCOUNTER (OUTPATIENT)
Dept: RADIOLOGY | Facility: CLINIC | Age: 67
Discharge: HOME | End: 2022-03-28
Attending: ANESTHESIOLOGY | Admitting: ANESTHESIOLOGY
Payer: MEDICARE

## 2022-03-28 VITALS
RESPIRATION RATE: 16 BRPM | SYSTOLIC BLOOD PRESSURE: 154 MMHG | OXYGEN SATURATION: 95 % | TEMPERATURE: 97 F | HEART RATE: 60 BPM | DIASTOLIC BLOOD PRESSURE: 93 MMHG

## 2022-03-28 DIAGNOSIS — M47.812 CERVICAL SPONDYLOSIS: ICD-10-CM

## 2022-03-28 PROCEDURE — 64634 DESTROY C/TH FACET JNT ADDL: CPT | Performed by: ANESTHESIOLOGY

## 2022-03-28 PROCEDURE — 64633 DESTROY CERV/THOR FACET JNT: CPT | Performed by: ANESTHESIOLOGY

## 2022-03-28 RX ADMIN — LIDOCAINE HYDROCHLORIDE 5 ML: 20 INJECTION, SOLUTION EPIDURAL; INFILTRATION; INTRACAUDAL at 11:43

## 2022-03-28 NOTE — H&P
History of Present Illness: The patient is a 77 y o  female who presents with complaints of neck pain      Patient Active Problem List   Diagnosis    Postprocedural hypothyroidism    Mixed hyperlipidemia    Benign essential hypertension    Anxiety    Chronic fatigue    Encounter for screening colonoscopy    Sleep disorder    Elevated LFTs    Pain in both lower extremities    Acute pain of both knees    Chronic bilateral low back pain with bilateral sciatica    Right shoulder injury    Neck pain    Left foot pain    Chronic pain of left ankle    Concussion without loss of consciousness    BMI 25 0-25 9,adult    Cervical spondylosis       Past Medical History:   Diagnosis Date    Anxiety     Arthritis     Disease of thyroid gland     Hypertension        Past Surgical History:   Procedure Laterality Date    BREAST LUMPECTOMY Left     Benign    THYROID SURGERY      Total Thyroidectomy         Current Outpatient Medications:     [facility] Thyroid 15 MG tablet, Take 15 mg by mouth daily , Disp: , Rfl:     [facility] Thyroid 60 MG tablet, Take 60 mg by mouth daily , Disp: , Rfl:     atenolol (TENORMIN) 25 mg tablet, Take 1 tablet (25 mg total) by mouth daily At 8 PM, Disp: 90 tablet, Rfl: 1    cyclobenzaprine (FLEXERIL) 10 mg tablet, Take 1 tablet (10 mg total) by mouth daily at bedtime as needed for muscle spasms, Disp: 30 tablet, Rfl: 1    LORazepam (ATIVAN) 0 5 mg tablet, Take 1 tablet (0 5 mg total) by mouth daily as needed for anxiety, Disp: 30 tablet, Rfl: 0    LORazepam (ATIVAN) 0 5 mg tablet, , Disp: , Rfl:     Current Facility-Administered Medications:     lidocaine (PF) (XYLOCAINE-MPF) 2 % injection 5 mL, 5 mL, Perineural, Once, Kenny Schulz, DO    No Known Allergies    Physical Exam:   General: Awake, Alert, Oriented x 3, Mood and affect appropriate  Respiratory: Respirations even and unlabored  Cardiovascular: Peripheral pulses intact; no edema  Musculoskeletal Exam:  Pain with cervical extension    ASA Score: II         Assessment:   1   Cervical spondylosis        Plan: Right C4,5,6FA

## 2022-03-28 NOTE — DISCHARGE INSTRUCTIONS

## 2022-03-29 NOTE — TELEPHONE ENCOUNTER
S/w pt, stated that she has some pain at the procedure site but her chronic pain symptoms have improved  Advised pt to allow 2-6 weeks for the full effect  Cb if s/s infection present: redness, drainage, swelling at the site or fever 100+, or if a sunburn like sensation in the area of the procedure presents  Ok to continue w/ rest, ice / heat, medication as prescribed / directed  Pt verbalized understanding and appreciation

## 2022-04-18 DIAGNOSIS — F41.9 ANXIETY: ICD-10-CM

## 2022-04-18 NOTE — TELEPHONE ENCOUNTER
Pt called, requesting a refill for the following that was selected   Pharmacy information was verified and confirmed per pt's request

## 2022-04-18 NOTE — TELEPHONE ENCOUNTER
Per HF needs 6 month appt  No upcoming appointments scheduled  Medication failed [facility] protocol  Please forward to your office staff for further review as this medication was reviewed by a [facility] RN

## 2022-04-19 RX ORDER — LORAZEPAM 0.5 MG/1
0.5 TABLET ORAL DAILY PRN
Qty: 30 TABLET | Refills: 0 | Status: SHIPPED | OUTPATIENT
Start: 2022-04-19

## 2022-04-25 ENCOUNTER — OFFICE VISIT (OUTPATIENT)
Dept: PAIN MEDICINE | Facility: CLINIC | Age: 67
End: 2022-04-25
Payer: MEDICARE

## 2022-04-25 VITALS
SYSTOLIC BLOOD PRESSURE: 110 MMHG | TEMPERATURE: 97.7 F | WEIGHT: 171 LBS | DIASTOLIC BLOOD PRESSURE: 80 MMHG | HEART RATE: 60 BPM | HEIGHT: 65 IN | BODY MASS INDEX: 28.49 KG/M2

## 2022-04-25 DIAGNOSIS — M79.18 MYOFASCIAL PAIN SYNDROME: ICD-10-CM

## 2022-04-25 DIAGNOSIS — M47.812 CERVICAL SPONDYLOSIS: ICD-10-CM

## 2022-04-25 DIAGNOSIS — M54.2 NECK PAIN: ICD-10-CM

## 2022-04-25 DIAGNOSIS — G89.4 CHRONIC PAIN SYNDROME: Primary | ICD-10-CM

## 2022-04-25 PROCEDURE — 99214 OFFICE O/P EST MOD 30 MIN: CPT | Performed by: NURSE PRACTITIONER

## 2022-04-25 RX ORDER — METHOCARBAMOL 750 MG/1
TABLET, FILM COATED ORAL
Qty: 60 TABLET | Refills: 0 | Status: SHIPPED | OUTPATIENT
Start: 2022-04-25

## 2022-04-25 NOTE — PROGRESS NOTES
Assessment:  1  Chronic pain syndrome    2  Neck pain    3  Cervical spondylosis    4  Myofascial pain syndrome        Plan:  The patient is a 77 y o  female last seen on 02/16/2022 who presents for a follow up office visit in regards to chronic pain secondary to neck pain, and cervical spondylosis  Patient presents today with ongoing neck pain  She had underwent a right C4-C6 radiofrequency ablation which had provided 50% pain relief  However she continues to be symptomatic  Majority of her pain appears to be myofascial   She has trigger point in the right splenius capitis muscle as well as the right trapezius  I will start her on methocarbamol 750 mg 1 tablet twice a day  She was made aware the medication may cause drowsiness and dizziness, and not drive while taking the medication  I asked that she contact the office in 2 weeks with an update on her pain symptoms  If there is no improvement, will schedule her for trigger point injections  to help with myofascial release    The patient will follow-up as needed for reevaluation  The patient was advised to contact the office should their symptoms worsen in the interim  The patient was agreeable and verbalized an understanding  History of Present Illness: The patient is a 77 y o  female last seen on 02/16/2022 who presents for a follow up office visit in regards to chronic pain secondary to neck pain, and cervical spondylosis  Her last office visit was March 28, 2022, in which she underwent a right C4-C6 radiofrequency ablation  Patient presents today with ongoing neck pain  The pain is located on the right side of her head  She feels 50% pain improvement since undergoing the radiofrequency ablation, but continues to be symptomatic  She does notice increased range of motion, but still feels pain when turning her head to the right side  She denies headaches  The pain radiates from the neck into the right shoulder    The pain is intermittent and described as sharp and throbbing  She is rating her pain a 6/10 on numeric rating scale    Patient has done physical therapy in the past which did provide some mild pain relief  She was prescribed cyclobenzaprine 10 mg by orthopedics, but is not taking this medication since it provided no relief initially when prescribed in January      I have personally reviewed and/or updated the patient's past medical history, past surgical history, family history, social history, current medications, allergies, and vital signs today  Review of Systems:    Review of Systems   Respiratory: Negative for shortness of breath  Cardiovascular: Negative for chest pain  Gastrointestinal: Negative for constipation, diarrhea, nausea and vomiting  Musculoskeletal: Negative for arthralgias, gait problem, joint swelling and myalgias  Skin: Negative for rash  Neurological: Negative for dizziness, seizures and weakness  All other systems reviewed and are negative  Past Medical History:   Diagnosis Date    Anxiety     Arthritis     Disease of thyroid gland     Hypertension        Past Surgical History:   Procedure Laterality Date    BREAST LUMPECTOMY Left     Benign    THYROID SURGERY      Total Thyroidectomy       Family History   Problem Relation Age of Onset    Arthritis Mother     Lung cancer Mother     Lung cancer Father     COPD Brother     Diabetes Maternal Grandmother         Mellitus    Diabetes Paternal Grandmother         Mellitus       Social History     Occupational History    Not on file   Tobacco Use    Smoking status: Former Smoker     Packs/day: 1 00     Years: 30 00     Pack years: 30 00     Types: Cigarettes     Quit date:      Years since quittin 3    Smokeless tobacco: Never Used   Vaping Use    Vaping Use: Never used   Substance and Sexual Activity    Alcohol use:  Yes     Alcohol/week: 14 0 standard drinks     Types: 14 Glasses of wine per week     Comment: Social  Drug use: No    Sexual activity: Not on file         Current Outpatient Medications:     [place] Thyroid 15 MG tablet, Take 15 mg by mouth daily , Disp: , Rfl:     [place] Thyroid 60 MG tablet, Take 60 mg by mouth daily , Disp: , Rfl:     atenolol (TENORMIN) 25 mg tablet, Take 1 tablet (25 mg total) by mouth daily At 8 PM, Disp: 90 tablet, Rfl: 1    LORazepam (ATIVAN) 0 5 mg tablet, Take 1 tablet (0 5 mg total) by mouth daily as needed for anxiety, Disp: 30 tablet, Rfl: 0    LORazepam (ATIVAN) 0 5 mg tablet, , Disp: , Rfl:     methocarbamol (ROBAXIN) 750 mg tablet, Take 0 5-1 tab in the evening and 1 tab at night, Disp: 60 tablet, Rfl: 0    No Known Allergies    Physical Exam:    /80 (BP Location: Left arm, Patient Position: Sitting, Cuff Size: Adult)   Pulse 60   Temp 97 7 °F (36 5 °C)   Ht 5' 5" (1 651 m)   Wt 77 6 kg (171 lb)   LMP  (LMP Unknown)   BMI 28 46 kg/m²     Constitutional:normal, well developed, well nourished, alert, in no distress and non-toxic and no overt pain behavior    Eyes:anicteric  HEENT:grossly intact  Neck:supple, symmetric, trachea midline and no masses   Pulmonary:even and unlabored  Cardiovascular:No edema or pitting edema present  Skin:Normal without rashes or lesions and well hydrated  Psychiatric:Mood and affect appropriate  Neurologic:Cranial Nerves II-XII grossly intact  Musculoskeletal:normal     Cervical Spine Exam    Appearance:  Normal lordosis  Palpation/Tenderness:  right trapezium tenderness  trigger points palpable  right splenius capitis with trigger point   Range of Motion:  Rotation - Left:  Minimally limited  without pain  Rotation - Right:  Minimally limited  with pain        Imaging    MRI CERVICAL SPINE WITHOUT CONTRAST     INDICATION: Chronic right-sided neck pain      COMPARISON:  X-ray of the cervical spine from April 12, 2021      TECHNIQUE:  Sagittal T1, sagittal T2, sagittal inversion recovery, axial T2, axial  2D merge  Imaging performed on 1 5T MRI    IMAGE QUALITY:  Diagnostic     FINDINGS:     ALIGNMENT:  Straightening of the cervical spine  No subluxation  Maintained vertebral body stature      MARROW SIGNAL:  Normal marrow signal is identified within the visualized bony structures  No discrete marrow lesion      CERVICAL AND VISUALIZED THORACIC CORD:  Normal signal within the visualized cord      PREVERTEBRAL AND PARASPINAL SOFT TISSUES:  The thyroid gland is not identified, presumably surgically absent      VISUALIZED POSTERIOR FOSSA:  The visualized posterior fossa demonstrates no abnormal signal      CERVICAL DISC SPACES:  Mild disc space narrowing at C5 upon C6      C2-C3:  Normal      C3-C4:  Small central protrusion  Moderate to severe right facet arthropathy with ipsilateral foraminal stenosis      C4-C5:  Tiny central herniation without spinal canal stenosis  Left greater than right facet arthropathy with mild left foraminal stenosis      C5-C6:  Posterior disc osteophyte complex with left facet arthropathy and mild bilateral uncovertebral hypertrophy  No significant spinal canal or foraminal stenosis      C6-C7:  Mild disc bulging without spinal canal or foraminal stenosis      C7-T1:  Normal      UPPER THORACIC DISC SPACES:  Normal      IMPRESSION:     Mild noncompressive degenerative discogenic disease as discussed above      Multilevel facet arthropathy, most notably on the right at C3-C4, on the left at C4-C5, on the left at C5-C6      No cord signal abnormality        No orders to display         No orders of the defined types were placed in this encounter

## 2022-06-16 ENCOUNTER — OFFICE VISIT (OUTPATIENT)
Dept: PAIN MEDICINE | Facility: CLINIC | Age: 67
End: 2022-06-16
Payer: MEDICARE

## 2022-06-16 VITALS
WEIGHT: 152 LBS | HEART RATE: 60 BPM | HEIGHT: 65 IN | BODY MASS INDEX: 25.33 KG/M2 | TEMPERATURE: 98 F | DIASTOLIC BLOOD PRESSURE: 88 MMHG | SYSTOLIC BLOOD PRESSURE: 122 MMHG

## 2022-06-16 DIAGNOSIS — M47.812 CERVICAL SPONDYLOSIS: ICD-10-CM

## 2022-06-16 DIAGNOSIS — M79.18 CERVICAL MYOFASCIAL PAIN SYNDROME: Primary | ICD-10-CM

## 2022-06-16 PROCEDURE — 99214 OFFICE O/P EST MOD 30 MIN: CPT | Performed by: ANESTHESIOLOGY

## 2022-06-16 NOTE — PROGRESS NOTES
Assessment  1  Cervical myofascial pain syndrome    2  Cervical spondylosis        Plan    Patient presents and follow-up from cervical radiofrequency  She notice significant relief however she is persistent pain in his specific dermatome and she has distinct trigger points which is contributing to her ongoing pain  I will refer her to Dr Bev David for evaluation and consider of trigger point injections  I did explain the theory of trigger points as well as the procedure in detail and provided literature for home review  Information will be forwarded  Patient is in agreement with the plan  My impressions and treatment recommendations were discussed in detail with the patient who verbalized understanding and had no further questions  Discharge instructions were provided  I personally saw and examined the patient and I agree with the above discussed plan of care  This note is created using dictation transcription  It may contain typographical errors, grammatical errors, improperly dictated words, background noise and other errors  Orders Placed This Encounter   Procedures    Ambulatory Referral to Neurology     Standing Status:   Future     Standing Expiration Date:   6/16/2023     Referral Priority:   Routine     Referral Type:   Consult - AMB     Referral Reason:   Specialty Services Required     Referred to Provider:   Shruti Woodruff     Requested Specialty:   Neurology     Number of Visits Requested:   1     Expiration Date:   6/16/2023     No orders of the defined types were placed in this encounter  History of Present Illness    Nella Barnes is a 77 y o  female presents and follow-up from cervical radiofrequency approximately six months ago  She is persistent right-sided neck pain in particular muscle group which interferes with daily living activities  It is constant ribs sharp and shooting in the right side of her neck  She denies any radiating pain    Turning and bending aggravate her symptoms  I have personally reviewed and/or updated the patient's past medical history, past surgical history, family history, social history, current medications, allergies, and vital signs today  Review of Systems   Constitutional: Negative for fever and unexpected weight change  HENT: Negative for trouble swallowing  Eyes: Negative for visual disturbance  Respiratory: Negative for shortness of breath and wheezing  Cardiovascular: Negative for chest pain and palpitations  Gastrointestinal: Negative for constipation, diarrhea, nausea and vomiting  Endocrine: Negative for cold intolerance, heat intolerance and polydipsia  Genitourinary: Negative for difficulty urinating and frequency  Musculoskeletal: Negative for arthralgias, gait problem, joint swelling and myalgias  Skin: Negative for rash  Neurological: Negative for dizziness, seizures, syncope, weakness and headaches  Hematological: Does not bruise/bleed easily  Psychiatric/Behavioral: Negative for dysphoric mood  All other systems reviewed and are negative        Patient Active Problem List   Diagnosis    Postprocedural hypothyroidism    Mixed hyperlipidemia    Benign essential hypertension    Anxiety    Chronic fatigue    Encounter for screening colonoscopy    Sleep disorder    Elevated LFTs    Pain in both lower extremities    Acute pain of both knees    Chronic bilateral low back pain with bilateral sciatica    Right shoulder injury    Neck pain    Left foot pain    Chronic pain of left ankle    Concussion without loss of consciousness    BMI 25 0-25 9,adult    Cervical spondylosis       Past Medical History:   Diagnosis Date    Anxiety     Arthritis     Disease of thyroid gland     Hypertension        Past Surgical History:   Procedure Laterality Date    BREAST LUMPECTOMY Left     Benign    THYROID SURGERY      Total Thyroidectomy       Family History   Problem Relation Age of Onset  Arthritis Mother     Lung cancer Mother     Lung cancer Father     COPD Brother     Diabetes Maternal Grandmother         Mellitus    Diabetes Paternal Grandmother         Mellitus       Social History     Occupational History    Not on file   Tobacco Use    Smoking status: Former Smoker     Packs/day: 1 00     Years: 30 00     Pack years: 30 00     Types: Cigarettes     Quit date:      Years since quittin 4    Smokeless tobacco: Never Used   Vaping Use    Vaping Use: Never used   Substance and Sexual Activity    Alcohol use: Yes     Alcohol/week: 14 0 standard drinks     Types: 14 Glasses of wine per week     Comment: Social    Drug use: No    Sexual activity: Not on file       Current Outpatient Medications on File Prior to Visit   Medication Sig    [facility] Thyroid 15 MG tablet Take 15 mg by mouth daily     [facility] Thyroid 60 MG tablet Take 60 mg by mouth daily     atenolol (TENORMIN) 25 mg tablet Take 1 tablet (25 mg total) by mouth daily At 8 PM    LORazepam (ATIVAN) 0 5 mg tablet Take 1 tablet (0 5 mg total) by mouth daily as needed for anxiety    methocarbamol (ROBAXIN) 750 mg tablet Take 0 5-1 tab in the evening and 1 tab at night    LORazepam (ATIVAN) 0 5 mg tablet      No current facility-administered medications on file prior to visit  No Known Allergies    Physical Exam    /88 (BP Location: Left arm, Patient Position: Sitting, Cuff Size: Standard)   Pulse 60   Temp 98 °F (36 7 °C)   Ht 5' 5" (1 651 m)   Wt 68 9 kg (152 lb)   LMP  (LMP Unknown)   BMI 25 29 kg/m²     Constitutional: normal, well developed, well nourished, alert, in no distress and non-toxic and no overt pain behavior    Eyes: anicteric  HEENT: grossly intact  Neck: supple, symmetric, trachea midline and no masses   Pulmonary:even and unlabored  Cardiovascular:No edema or pitting edema present  Skin:Normal without rashes or lesions and well hydrated  Psychiatric:Mood and affect appropriate  Neurologic:Cranial Nerves II-XII grossly intact  Musculoskeletal:normal,   Inspection:  Normal station and gait  Normal cervical curves and head posture  Skin intact without erythema  No sensory loss  There is no atrophy  Palpation:  There is tenderness to palpation overlying the right l cervical paraspinals  There is no tenderness over the upper trapezius muscles bilateral  No shoulder tenderness  Motor/Strength:  5/5 strength in the bilateral upper extremities  Reflexes:  equal and symmetric in the upper limbs  Sensation:   Sensation intact to light touch and pinprick in the upper limbs  Maneuvers:  Negative Spurling's maneuver  Negative Lhermitte's sign  Imaging  MRI CERVICAL SPINE WITHOUT CONTRAST     INDICATION: Chronic right-sided neck pain      COMPARISON:  X-ray of the cervical spine from April 12, 2021      TECHNIQUE:  Sagittal T1, sagittal T2, sagittal inversion recovery, axial T2, axial  2D merge  Imaging performed on 1 5T MRI    IMAGE QUALITY:  Diagnostic     FINDINGS:     ALIGNMENT:  Straightening of the cervical spine  No subluxation  Maintained vertebral body stature      MARROW SIGNAL:  Normal marrow signal is identified within the visualized bony structures  No discrete marrow lesion      CERVICAL AND VISUALIZED THORACIC CORD:  Normal signal within the visualized cord      PREVERTEBRAL AND PARASPINAL SOFT TISSUES:  The thyroid gland is not identified, presumably surgically absent      VISUALIZED POSTERIOR FOSSA:  The visualized posterior fossa demonstrates no abnormal signal      CERVICAL DISC SPACES:  Mild disc space narrowing at C5 upon C6      C2-C3:  Normal      C3-C4:  Small central protrusion  Moderate to severe right facet arthropathy with ipsilateral foraminal stenosis      C4-C5:  Tiny central herniation without spinal canal stenosis    Left greater than right facet arthropathy with mild left foraminal stenosis      C5-C6:  Posterior disc osteophyte complex with left facet arthropathy and mild bilateral uncovertebral hypertrophy  No significant spinal canal or foraminal stenosis      C6-C7:  Mild disc bulging without spinal canal or foraminal stenosis      C7-T1:  Normal      UPPER THORACIC DISC SPACES:  Normal      IMPRESSION:     Mild noncompressive degenerative discogenic disease as discussed above      Multilevel facet arthropathy, most notably on the right at C3-C4, on the left at C4-C5, on the left at C5-C6  I have personally reviewed pertinent films in PACS and my interpretation is Cervical spondylosis

## 2022-07-27 ENCOUNTER — TELEPHONE (OUTPATIENT)
Dept: NEUROLOGY | Facility: CLINIC | Age: 67
End: 2022-07-27

## 2022-07-27 NOTE — TELEPHONE ENCOUNTER
Patient calling to schedule new patient appointment for myofascial pain syndrome  Testing done  Triage intake sent

## 2022-08-04 ENCOUNTER — OFFICE VISIT (OUTPATIENT)
Dept: FAMILY MEDICINE CLINIC | Facility: CLINIC | Age: 67
End: 2022-08-04
Payer: MEDICARE

## 2022-08-04 VITALS
RESPIRATION RATE: 17 BRPM | TEMPERATURE: 98.2 F | BODY MASS INDEX: 24.83 KG/M2 | DIASTOLIC BLOOD PRESSURE: 80 MMHG | SYSTOLIC BLOOD PRESSURE: 118 MMHG | HEIGHT: 65 IN | WEIGHT: 149 LBS

## 2022-08-04 DIAGNOSIS — I10 BENIGN ESSENTIAL HYPERTENSION: ICD-10-CM

## 2022-08-04 DIAGNOSIS — Z12.12 SCREENING FOR COLORECTAL CANCER: ICD-10-CM

## 2022-08-04 DIAGNOSIS — Z13.1 SCREENING FOR DIABETES MELLITUS: ICD-10-CM

## 2022-08-04 DIAGNOSIS — Z12.31 ENCOUNTER FOR SCREENING MAMMOGRAM FOR BREAST CANCER: ICD-10-CM

## 2022-08-04 DIAGNOSIS — Z00.00 MEDICARE ANNUAL WELLNESS VISIT, INITIAL: Primary | ICD-10-CM

## 2022-08-04 DIAGNOSIS — K63.5 POLYP OF COLON, UNSPECIFIED PART OF COLON, UNSPECIFIED TYPE: ICD-10-CM

## 2022-08-04 DIAGNOSIS — Z78.0 ASYMPTOMATIC POSTMENOPAUSAL STATE: ICD-10-CM

## 2022-08-04 DIAGNOSIS — E89.0 POSTPROCEDURAL HYPOTHYROIDISM: ICD-10-CM

## 2022-08-04 DIAGNOSIS — R07.9 CHEST PAIN, UNSPECIFIED TYPE: ICD-10-CM

## 2022-08-04 DIAGNOSIS — R15.0 INCOMPLETE PASSAGE OF STOOL: ICD-10-CM

## 2022-08-04 DIAGNOSIS — Z23 ENCOUNTER FOR IMMUNIZATION: ICD-10-CM

## 2022-08-04 DIAGNOSIS — S06.0X0S CONCUSSION WITHOUT LOSS OF CONSCIOUSNESS, SEQUELA (HCC): ICD-10-CM

## 2022-08-04 DIAGNOSIS — Z13.6 SCREENING FOR CARDIOVASCULAR CONDITION: ICD-10-CM

## 2022-08-04 DIAGNOSIS — E78.2 MIXED HYPERLIPIDEMIA: ICD-10-CM

## 2022-08-04 DIAGNOSIS — F41.9 ANXIETY: ICD-10-CM

## 2022-08-04 DIAGNOSIS — Z12.11 SCREENING FOR COLORECTAL CANCER: ICD-10-CM

## 2022-08-04 DIAGNOSIS — Z12.4 SCREENING FOR CERVICAL CANCER: ICD-10-CM

## 2022-08-04 DIAGNOSIS — M47.812 CERVICAL SPONDYLOSIS: ICD-10-CM

## 2022-08-04 PROBLEM — S06.0X0A CONCUSSION WITHOUT LOSS OF CONSCIOUSNESS: Status: RESOLVED | Noted: 2021-04-12 | Resolved: 2022-08-04

## 2022-08-04 PROBLEM — M25.572 CHRONIC PAIN OF LEFT ANKLE: Status: RESOLVED | Noted: 2021-04-12 | Resolved: 2022-08-04

## 2022-08-04 PROBLEM — M79.672 LEFT FOOT PAIN: Status: RESOLVED | Noted: 2021-04-12 | Resolved: 2022-08-04

## 2022-08-04 PROBLEM — M79.604 PAIN IN BOTH LOWER EXTREMITIES: Status: RESOLVED | Noted: 2019-05-31 | Resolved: 2022-08-04

## 2022-08-04 PROBLEM — M79.605 PAIN IN BOTH LOWER EXTREMITIES: Status: RESOLVED | Noted: 2019-05-31 | Resolved: 2022-08-04

## 2022-08-04 PROBLEM — G89.29 CHRONIC PAIN OF LEFT ANKLE: Status: RESOLVED | Noted: 2021-04-12 | Resolved: 2022-08-04

## 2022-08-04 PROCEDURE — G0009 ADMIN PNEUMOCOCCAL VACCINE: HCPCS

## 2022-08-04 PROCEDURE — G0438 PPPS, INITIAL VISIT: HCPCS | Performed by: FAMILY MEDICINE

## 2022-08-04 PROCEDURE — 90677 PCV20 VACCINE IM: CPT

## 2022-08-04 NOTE — PATIENT INSTRUCTIONS
Medicare Preventive Visit Patient Instructions  Thank you for completing your Welcome to Medicare Visit or Medicare Annual Wellness Visit today  Your next wellness visit will be due in one year (8/5/2023)  The screening/preventive services that you may require over the next 5-10 years are detailed below  Some tests may not apply to you based off risk factors and/or age  Screening tests ordered at today's visit but not completed yet may show as past due  Also, please note that scanned in results may not display below  Preventive Screenings:  Service Recommendations Previous Testing/Comments   Colorectal Cancer Screening  * Colonoscopy    * Fecal Occult Blood Test (FOBT)/Fecal Immunochemical Test (FIT)  * Fecal DNA/Cologuard Test  * Flexible Sigmoidoscopy Age: 54-65 years old   Colonoscopy: every 10 years (may be performed more frequently if at higher risk)  OR  FOBT/FIT: every 1 year  OR  Cologuard: every 3 years  OR  Sigmoidoscopy: every 5 years  Screening may be recommended earlier than age 48 if at higher risk for colorectal cancer  Also, an individualized decision between you and your healthcare provider will decide whether screening between the ages of 74-80 would be appropriate  Colonoscopy: 12/17/2013  FOBT/FIT: Not on file  Cologuard: Not on file  Sigmoidoscopy: Not on file          Breast Cancer Screening Age: 36 years old  Frequency: every 1-2 years  Not required if history of left and right mastectomy Mammogram: 12/04/2019        Cervical Cancer Screening Between the ages of 21-29, pap smear recommended once every 3 years  Between the ages of 33-67, can perform pap smear with HPV co-testing every 5 years     Recommendations may differ for women with a history of total hysterectomy, cervical cancer, or abnormal pap smears in past  Pap Smear: 03/09/2020        Hepatitis C Screening Once for adults born between 1945 and 1965  More frequently in patients at high risk for Hepatitis C Hep C Antibody: 01/29/2019        Diabetes Screening 1-2 times per year if you're at risk for diabetes or have pre-diabetes Fasting glucose: No results in last 5 years   A1C: No results in last 5 years        Cholesterol Screening Once every 5 years if you don't have a lipid disorder  May order more often based on risk factors  Lipid panel: 06/17/2021          Other Preventive Screenings Covered by Medicare:  1  Abdominal Aortic Aneurysm (AAA) Screening: covered once if your at risk  You're considered to be at risk if you have a family history of AAA  2  Lung Cancer Screening: covers low dose CT scan once per year if you meet all of the following conditions: (1) Age 50-69; (2) No signs or symptoms of lung cancer; (3) Current smoker or have quit smoking within the last 15 years; (4) You have a tobacco smoking history of at least 30 pack years (packs per day multiplied by number of years you smoked); (5) You get a written order from a healthcare provider  3  Glaucoma Screening: covered annually if you're considered high risk: (1) You have diabetes OR (2) Family history of glaucoma OR (3)  aged 48 and older OR (3)  American aged 72 and older  3  Osteoporosis Screening: covered every 2 years if you meet one of the following conditions: (1) You're estrogen deficient and at risk for osteoporosis based off medical history and other findings; (2) Have a vertebral abnormality; (3) On glucocorticoid therapy for more than 3 months; (4) Have primary hyperparathyroidism; (5) On osteoporosis medications and need to assess response to drug therapy  · Last bone density test (DXA Scan): 03/06/2015  5  HIV Screening: covered annually if you're between the age of 12-76  Also covered annually if you are younger than 13 and older than 72 with risk factors for HIV infection  For pregnant patients, it is covered up to 3 times per pregnancy      Immunizations:  Immunization Recommendations   Influenza Vaccine Annual influenza vaccination during flu season is recommended for all persons aged >= 6 months who do not have contraindications   Pneumococcal Vaccine (Prevnar and Pneumovax)  * Prevnar = PCV13  * Pneumovax = PPSV23   Adults 25-60 years old: 1-3 doses may be recommended based on certain risk factors  Adults 72 years old: Prevnar (PCV13) vaccine recommended followed by Pneumovax (PPSV23) vaccine  If already received PPSV23 since turning 65, then PCV13 recommended at least one year after PPSV23 dose  Hepatitis B Vaccine 3 dose series if at intermediate or high risk (ex: diabetes, end stage renal disease, liver disease)   Tetanus (Td) Vaccine - COST NOT COVERED BY MEDICARE PART B Following completion of primary series, a booster dose should be given every 10 years to maintain immunity against tetanus  Td may also be given as tetanus wound prophylaxis  Tdap Vaccine - COST NOT COVERED BY MEDICARE PART B Recommended at least once for all adults  For pregnant patients, recommended with each pregnancy  Shingles Vaccine (Shingrix) - COST NOT COVERED BY MEDICARE PART B  2 shot series recommended in those aged 48 and above     Health Maintenance Due:      Topic Date Due    Colorectal Cancer Screening  12/17/2018    DXA SCAN  03/06/2020    Breast Cancer Screening: Mammogram  12/04/2020    Cervical Cancer Screening  03/09/2021    Hepatitis C Screening  Completed     Immunizations Due:      Topic Date Due    COVID-19 Vaccine (1) Never done    Pneumococcal Vaccine: 65+ Years (2 - PPSV23 or PCV20) 06/15/2022    Influenza Vaccine (1) 09/01/2022     Advance Directives   What are advance directives? Advance directives are legal documents that state your wishes and plans for medical care  These plans are made ahead of time in case you lose your ability to make decisions for yourself  Advance directives can apply to any medical decision, such as the treatments you want, and if you want to donate organs     What are the types of advance directives? There are many types of advance directives, and each state has rules about how to use them  You may choose a combination of any of the following:  · Living will: This is a written record of the treatment you want  You can also choose which treatments you do not want, which to limit, and which to stop at a certain time  This includes surgery, medicine, IV fluid, and tube feedings  · Durable power of  for healthcare [facility]): This is a written record that states who you want to make healthcare choices for you when you are unable to make them for yourself  This person, called a proxy, is usually a family member or a friend  You may choose more than 1 proxy  · Do not resuscitate (DNR) order:  A DNR order is used in case your heart stops beating or you stop breathing  It is a request not to have certain forms of treatment, such as CPR  A DNR order may be included in other types of advance directives  · Medical directive: This covers the care that you want if you are in a coma, near death, or unable to make decisions for yourself  You can list the treatments you want for each condition  Treatment may include pain medicine, surgery, blood transfusions, dialysis, IV or tube feedings, and a ventilator (breathing machine)  · Values history: This document has questions about your views, beliefs, and how you feel and think about life  This information can help others choose the care that you would choose  Why are advance directives important? An advance directive helps you control your care  Although spoken wishes may be used, it is better to have your wishes written down  Spoken wishes can be misunderstood, or not followed  Treatments may be given even if you do not want them  An advance directive may make it easier for your family to make difficult choices about your care     Weight Management   Why it is important to manage your weight:  Being overweight increases your risk of health conditions such as heart disease, high blood pressure, type 2 diabetes, and certain types of cancer  It can also increase your risk for osteoarthritis, sleep apnea, and other respiratory problems  Aim for a slow, steady weight loss  Even a small amount of weight loss can lower your risk of health problems  How to lose weight safely:  A safe and healthy way to lose weight is to eat fewer calories and get regular exercise  You can lose up about 1 pound a week by decreasing the number of calories you eat by 500 calories each day  Healthy meal plan for weight management:  A healthy meal plan includes a variety of foods, contains fewer calories, and helps you stay healthy  A healthy meal plan includes the following:  · Eat whole-grain foods more often  A healthy meal plan should contain fiber  Fiber is the part of grains, fruits, and vegetables that is not broken down by your body  Whole-grain foods are healthy and provide extra fiber in your diet  Some examples of whole-grain foods are whole-wheat breads and pastas, oatmeal, brown rice, and bulgur  · Eat a variety of vegetables every day  Include dark, leafy greens such as spinach, kale, lucille greens, and mustard greens  Eat yellow and orange vegetables such as carrots, sweet potatoes, and winter squash  · Eat a variety of fruits every day  Choose fresh or canned fruit (canned in its own juice or light syrup) instead of juice  Fruit juice has very little or no fiber  · Eat low-fat dairy foods  Drink fat-free (skim) milk or 1% milk  Eat fat-free yogurt and low-fat cottage cheese  Try low-fat cheeses such as mozzarella and other reduced-fat cheeses  · Choose meat and other protein foods that are low in fat  Choose beans or other legumes such as split peas or lentils  Choose fish, skinless poultry (chicken or turkey), or lean cuts of red meat (beef or pork)  Before you cook meat or poultry, cut off any visible fat  · Use less fat and oil    Try baking foods instead of frying them  Add less fat, such as margarine, sour cream, regular salad dressing and mayonnaise to foods  Eat fewer high-fat foods  Some examples of high-fat foods include french fries, doughnuts, ice cream, and cakes  · Eat fewer sweets  Limit foods and drinks that are high in sugar  This includes candy, cookies, regular soda, and sweetened drinks  Exercise:  Exercise at least 30 minutes per day on most days of the week  Some examples of exercise include walking, biking, dancing, and swimming  You can also fit in more physical activity by taking the stairs instead of the elevator or parking farther away from stores  Ask your healthcare provider about the best exercise plan for you  © Copyright [employer] 2018 Information is for End User's use only and may not be sold, redistributed or otherwise used for commercial purposes   All illustrations and images included in CareNotes® are the copyrighted property of A D A M , Inc  or [address] [place] [address]

## 2022-08-04 NOTE — PROGRESS NOTES
Assessment and Plan:     Problem List Items Addressed This Visit        Endocrine    Postprocedural hypothyroidism     Follows with endocrine in [address] [place] [address]  On medication  S/p complete thyroidectomy             Cardiovascular and Mediastinum    Benign essential hypertension    Relevant Orders    Lipid panel    Comprehensive metabolic panel       Musculoskeletal and Integument    Cervical spondylosis     Has seen dr Vick Roque, was referred for trigger point injections             Other    Mixed hyperlipidemia    Relevant Orders    Lipid panel    Comprehensive metabolic panel    Anxiety    BMI 25 0-25 9,adult     BMI Counseling: Body mass index is 25 03 kg/m²  The BMI is above normal  Nutrition recommendations include reducing portion sizes, decreasing overall calorie intake and 3-5 servings of fruits/vegetables daily  Exercise recommendations include vigorous aerobic physical activity for 75 minutes/week           Relevant Orders    Lipid panel    Comprehensive metabolic panel    Chest pain     She was having symptoms which have mostly resolved since her endocrine changed her medication  Will check stress test and labs as well          Relevant Orders    Stress test only, exercise      Other Visit Diagnoses     Medicare annual wellness visit, initial    -  Primary    Relevant Orders    Lipid panel    Comprehensive metabolic panel    Screening for diabetes mellitus        Relevant Orders    Lipid panel    Comprehensive metabolic panel    Screening for cardiovascular condition        Relevant Orders    Lipid panel    Comprehensive metabolic panel    Encounter for screening mammogram for breast cancer        Relevant Orders    Mammo screening bilateral w 3d & cad    Asymptomatic postmenopausal state        Relevant Orders    DXA bone density spine hip and pelvis    Screening for colorectal cancer        Relevant Orders    Ambulatory referral to Gastroenterology    Polyp of colon, unspecified part of colon, unspecified type Relevant Orders    Ambulatory referral to Gastroenterology    Incomplete passage of stool        Relevant Orders    Ambulatory referral to Gastroenterology    Screening for cervical cancer        Relevant Orders    Ambulatory Referral to Obstetrics / Gynecology    Concussion without loss of consciousness, sequela ([facility] [address] )        Encounter for immunization        Relevant Orders    Pneumococcal Conjugate Vaccine 20-valent (Pcv20) (Completed)          Depression Screening and Follow-up Plan: Patient was screened for depression during today's encounter  They screened negative with a PHQ-2 score of 0  Falls Plan of Care: balance, strength, and gait training instructions were provided  Preventive health issues were discussed with patient, and age appropriate screening tests were ordered as noted in patient's After Visit Summary  Personalized health advice and appropriate referrals for health education or preventive services given if needed, as noted in patient's After Visit Summary  History of Present Illness:     Patient presents for a Medicare Wellness Visit    HPI   Patient Care Team:  Augie Haney DO as PCP - General (Family Medicine)  Lucia Sanz MD  [address] MD     Review of Systems:     Review of Systems   Constitutional: Negative for chills, fatigue and fever  HENT: Negative for congestion, postnasal drip, rhinorrhea and sinus pressure  Eyes: Negative for photophobia and visual disturbance  Respiratory: Negative for cough and shortness of breath  Cardiovascular: Negative for chest pain, palpitations and leg swelling  Gastrointestinal: Negative for abdominal pain, constipation, diarrhea, nausea and vomiting  Genitourinary: Negative for difficulty urinating and dysuria  Musculoskeletal: Negative for arthralgias and myalgias  Skin: Negative for color change and rash  Neurological: Negative for dizziness, weakness, light-headedness and headaches  Problem List:     Patient Active Problem List   Diagnosis    Postprocedural hypothyroidism    Mixed hyperlipidemia    Benign essential hypertension    Anxiety    Chronic fatigue    Encounter for screening colonoscopy    Sleep disorder    Elevated LFTs    Acute pain of both knees    Chronic bilateral low back pain with bilateral sciatica    Right shoulder injury    Neck pain    BMI 25 0-25 9,adult    Cervical spondylosis    Chest pain      Past Medical and Surgical History:     Past Medical History:   Diagnosis Date    Anxiety     Arthritis     Disease of thyroid gland     Hypertension      Past Surgical History:   Procedure Laterality Date    BREAST LUMPECTOMY Left     Benign    THYROID SURGERY      Total Thyroidectomy      Family History:     Family History   Problem Relation Age of Onset    Arthritis Mother     Lung cancer Mother    Bucky Rajiv Lung cancer Father     COPD Brother     Diabetes Maternal Grandmother         Mellitus    Diabetes Paternal Grandmother         Mellitus      Social History:     Social History     Socioeconomic History    Marital status:      Spouse name: None    Number of children: 2    Years of education: None    Highest education level: None   Occupational History    None   Tobacco Use    Smoking status: Former Smoker     Packs/day: 1 00     Years: 30 00     Pack years: 30 00     Types: Cigarettes     Quit date:      Years since quittin 6    Smokeless tobacco: Never Used   Vaping Use    Vaping Use: Never used   Substance and Sexual Activity    Alcohol use:  Yes     Alcohol/week: 14 0 standard drinks     Types: 14 Glasses of wine per week     Comment: Social    Drug use: No    Sexual activity: None   Other Topics Concern    None   Social History Narrative    Caffeine use     Social Determinants of Health     Financial Resource Strain: Not on file   Food Insecurity: Not on file   Transportation Needs: Not on file   Physical Activity: Not on file Stress: Not on file   Social Connections: Not on file   Intimate Partner Violence: Not At Risk    Fear of Current or Ex-Partner: No    Emotionally Abused: No    Physically Abused: No    Sexually Abused: No   Housing Stability: Not on file      Medications and Allergies:     Current Outpatient Medications   Medication Sig Dispense Refill    [place] Thyroid 60 MG tablet Take 60 mg by mouth daily       atenolol (TENORMIN) 25 mg tablet Take 1 tablet (25 mg total) by mouth daily At 8 PM 90 tablet 1    LORazepam (ATIVAN) 0 5 mg tablet Take 1 tablet (0 5 mg total) by mouth daily as needed for anxiety 30 tablet 0    [place] Thyroid 15 MG tablet Take 15 mg by mouth daily  (Patient not taking: Reported on 8/4/2022)      methocarbamol (ROBAXIN) 750 mg tablet Take 0 5-1 tab in the evening and 1 tab at night (Patient not taking: Reported on 8/4/2022) 60 tablet 0     No current facility-administered medications for this visit  No Known Allergies   Immunizations:     Immunization History   Administered Date(s) Administered    Influenza, seasonal, injectable 11/19/2013    Pneumococcal Conjugate 13-Valent 06/15/2021    Pneumococcal Conjugate Vaccine 20-valent (Pcv20), Polysace 08/04/2022    Tdap 06/15/2021      Health Maintenance:         Topic Date Due    Colorectal Cancer Screening  12/17/2018    DXA SCAN  03/06/2020    Breast Cancer Screening: Mammogram  12/04/2020    Cervical Cancer Screening  03/09/2021    Hepatitis C Screening  Completed         Topic Date Due    COVID-19 Vaccine (1) Never done    Influenza Vaccine (1) 09/01/2022      Medicare Screening Tests and Risk Assessments:     Justino Reyna is here for her Initial Wellness visit  Last Medicare Wellness visit information reviewed, patient interviewed, no change since last AWV  Health Risk Assessment:   Patient rates overall health as good  Patient feels that their physical health rating is same  Patient is satisfied with their life   Eyesight was rated as same  Hearing was rated as same  Patient feels that their emotional and mental health rating is same  Patients states they are never, rarely angry  Patient states they are never, rarely unusually tired/fatigued  Pain experienced in the last 7 days has been some  Patient's pain rating has been 6/10  Patient states that she has experienced no weight loss or gain in last 6 months  Depression Screening:   PHQ-2 Score: 0      Fall Risk Screening: In the past year, patient has experienced: history of falling in past year    Number of falls: 2 or more  Injured during fall?: No    Feels unsteady when standing or walking?: No    Worried about falling?: No      Urinary Incontinence Screening:   Patient has not leaked urine accidently in the last six months  Home Safety:  Patient does not have trouble with stairs inside or outside of their home  Patient has working smoke alarms and has working carbon monoxide detector  Home safety hazards include: none  Nutrition:   Current diet is Regular  Medications:   Patient is not currently taking any over-the-counter supplements  Patient is able to manage medications  Activities of Daily Living (ADLs)/Instrumental Activities of Daily Living (IADLs):   Walk and transfer into and out of bed and chair?: Yes  Dress and groom yourself?: Yes    Bathe or shower yourself?: Yes    Feed yourself?  Yes  Do your laundry/housekeeping?: Yes  Manage your money, pay your bills and track your expenses?: Yes  Make your own meals?: Yes    Do your own shopping?: Yes    Previous Hospitalizations:   Any hospitalizations or ED visits within the last 12 months?: No      Advance Care Planning:   Living will: Yes    Advanced directive: Yes    Advanced directive counseling given: Yes      PREVENTIVE SCREENINGS      Cardiovascular Screening:    General: Screening Not Indicated, History Lipid Disorder and Risks and Benefits Discussed      Diabetes Screening:     General: Risks and Benefits Discussed      Colorectal Cancer Screening:     General: Screening Current      Breast Cancer Screening:     General: Risks and Benefits Discussed      Cervical Cancer Screening:    General: Screening Not Indicated and Risks and Benefits Discussed      Lung Cancer Screening:     General: Screening Not Indicated      Hepatitis C Screening:    General: Screening Current    Screening, Brief Intervention, and Referral to Treatment (SBIRT)    Screening  Typical number of drinks in a day: 0  Typical number of drinks in a week: 0  Interpretation: Low risk drinking behavior  Single Item Drug Screening:  How often have you used an illegal drug (including marijuana) or a prescription medication for non-medical reasons in the past year? never    Single Item Drug Screen Score: 0  Interpretation: Negative screen for possible drug use disorder     Visual Acuity Screening    Right eye Left eye Both eyes   Without correction:      With correction: 20/20 20/20 20/20   Comments: Glasses        Physical Exam:     /80   Temp 98 2 °F (36 8 °C) (Tympanic)   Resp 17   Ht 5' 4 7" (1 643 m)   Wt 67 6 kg (149 lb)   LMP  (LMP Unknown)   Breastfeeding No   BMI 25 03 kg/m²     Physical Exam  Constitutional:       General: She is not in acute distress  Appearance: Normal appearance  She is not ill-appearing, toxic-appearing or diaphoretic  HENT:      Head: Normocephalic and atraumatic  Right Ear: Tympanic membrane and ear canal normal       Left Ear: Tympanic membrane and ear canal normal       Nose: Nose normal  No congestion  Mouth/Throat:      Mouth: Mucous membranes are moist       Pharynx: Oropharynx is clear  No oropharyngeal exudate  Eyes:      Extraocular Movements: Extraocular movements intact  Conjunctiva/sclera: Conjunctivae normal       Pupils: Pupils are equal, round, and reactive to light  Cardiovascular:      Rate and Rhythm: Normal rate and regular rhythm        Pulses: Normal pulses  Heart sounds: No murmur heard  Pulmonary:      Effort: Pulmonary effort is normal       Breath sounds: Normal breath sounds  No wheezing, rhonchi or rales  Abdominal:      General: Bowel sounds are normal  There is no distension  Palpations: Abdomen is soft  Tenderness: There is no abdominal tenderness  Musculoskeletal:         General: No swelling or tenderness  Normal range of motion  Cervical back: Normal range of motion and neck supple  Skin:     General: Skin is warm and dry  Capillary Refill: Capillary refill takes less than 2 seconds  Neurological:      General: No focal deficit present  Mental Status: She is alert and oriented to person, place, and time  Cranial Nerves: No cranial nerve deficit  Psychiatric:         Mood and Affect: Mood normal          Behavior: Behavior normal          Thought Content:  Thought content normal           Balinda Dakins, DO

## 2022-08-04 NOTE — ASSESSMENT & PLAN NOTE
She was having symptoms which have mostly resolved since her endocrine changed her medication  Will check stress test and labs as well

## 2022-08-06 LAB
ALBUMIN SERPL-MCNC: 4.4 G/DL (ref 3.8–4.8)
ALBUMIN/GLOB SERPL: 1.9 {RATIO} (ref 1.2–2.2)
ALP SERPL-CCNC: 85 IU/L (ref 44–121)
ALT SERPL-CCNC: 39 IU/L (ref 0–32)
AST SERPL-CCNC: 21 IU/L (ref 0–40)
BILIRUB SERPL-MCNC: 0.5 MG/DL (ref 0–1.2)
BUN SERPL-MCNC: 15 MG/DL (ref 8–27)
BUN/CREAT SERPL: 19 (ref 12–28)
CALCIUM SERPL-MCNC: 9.5 MG/DL (ref 8.7–10.3)
CHLORIDE SERPL-SCNC: 100 MMOL/L (ref 96–106)
CHOLEST SERPL-MCNC: 248 MG/DL (ref 100–199)
CHOLEST/HDLC SERPL: 4.8 RATIO (ref 0–4.4)
CO2 SERPL-SCNC: 24 MMOL/L (ref 20–29)
CREAT SERPL-MCNC: 0.78 MG/DL (ref 0.57–1)
EGFR: 84 ML/MIN/1.73
GLOBULIN SER-MCNC: 2.3 G/DL (ref 1.5–4.5)
GLUCOSE SERPL-MCNC: 89 MG/DL (ref 65–99)
HDLC SERPL-MCNC: 52 MG/DL
LDLC SERPL CALC-MCNC: 153 MG/DL (ref 0–99)
POTASSIUM SERPL-SCNC: 4.6 MMOL/L (ref 3.5–5.2)
PROT SERPL-MCNC: 6.7 G/DL (ref 6–8.5)
SL AMB VLDL CHOLESTEROL CALC: 43 MG/DL (ref 5–40)
SODIUM SERPL-SCNC: 137 MMOL/L (ref 134–144)
TRIGL SERPL-MCNC: 235 MG/DL (ref 0–149)

## 2022-08-16 DIAGNOSIS — F41.9 ANXIETY: ICD-10-CM

## 2022-08-18 RX ORDER — LORAZEPAM 0.5 MG/1
0.5 TABLET ORAL DAILY PRN
Qty: 30 TABLET | Refills: 0 | Status: SHIPPED | OUTPATIENT
Start: 2022-08-18

## 2022-08-29 DIAGNOSIS — I10 ESSENTIAL HYPERTENSION: ICD-10-CM

## 2022-08-29 DIAGNOSIS — I10 BENIGN ESSENTIAL HYPERTENSION: ICD-10-CM

## 2022-08-29 RX ORDER — ATENOLOL 25 MG/1
25 TABLET ORAL DAILY
Qty: 90 TABLET | Refills: 1 | Status: SHIPPED | OUTPATIENT
Start: 2022-08-29

## 2022-09-16 ENCOUNTER — HOSPITAL ENCOUNTER (OUTPATIENT)
Dept: BONE DENSITY | Facility: CLINIC | Age: 67
Discharge: HOME | End: 2022-09-16
Payer: MEDICARE

## 2022-09-16 ENCOUNTER — HOSPITAL ENCOUNTER (OUTPATIENT)
Dept: MAMMOGRAPHY | Facility: CLINIC | Age: 67
Discharge: HOME | End: 2022-09-16
Payer: MEDICARE

## 2022-09-16 VITALS — WEIGHT: 153.2 LBS | HEIGHT: 64 IN | BODY MASS INDEX: 26.15 KG/M2

## 2022-09-16 DIAGNOSIS — Z12.31 ENCOUNTER FOR SCREENING MAMMOGRAM FOR BREAST CANCER: ICD-10-CM

## 2022-09-16 DIAGNOSIS — Z78.0 ASYMPTOMATIC POSTMENOPAUSAL STATE: ICD-10-CM

## 2022-09-16 PROCEDURE — 77080 DXA BONE DENSITY AXIAL: CPT

## 2022-09-16 PROCEDURE — 77063 BREAST TOMOSYNTHESIS BI: CPT

## 2022-09-16 PROCEDURE — 77067 SCR MAMMO BI INCL CAD: CPT

## 2022-11-21 DIAGNOSIS — F41.9 ANXIETY: ICD-10-CM

## 2022-11-21 RX ORDER — LORAZEPAM 0.5 MG/1
0.5 TABLET ORAL DAILY PRN
Qty: 30 TABLET | Refills: 0 | Status: SHIPPED | OUTPATIENT
Start: 2022-11-21

## 2023-01-30 ENCOUNTER — OFFICE VISIT (OUTPATIENT)
Dept: OBGYN CLINIC | Facility: CLINIC | Age: 68
End: 2023-01-30

## 2023-01-30 VITALS
DIASTOLIC BLOOD PRESSURE: 62 MMHG | BODY MASS INDEX: 25.81 KG/M2 | HEIGHT: 64 IN | WEIGHT: 151.2 LBS | SYSTOLIC BLOOD PRESSURE: 122 MMHG

## 2023-01-30 DIAGNOSIS — Z01.419 ENCOUNTER FOR ANNUAL ROUTINE GYNECOLOGICAL EXAMINATION: Primary | ICD-10-CM

## 2023-01-30 DIAGNOSIS — Z12.31 ENCOUNTER FOR SCREENING MAMMOGRAM FOR MALIGNANT NEOPLASM OF BREAST: ICD-10-CM

## 2023-01-30 RX ORDER — THYROID,PORK 15 MG
TABLET ORAL
COMMUNITY
Start: 2023-01-18

## 2023-01-30 NOTE — PROGRESS NOTES
Assessment/Plan:     Diagnoses and all orders for this visit:    Encounter for annual routine gynecological examination    Encounter for screening mammogram for malignant neoplasm of breast  -     Mammo screening bilateral w 3d & cad; Future    Other orders  -     [place] Thyroid 15 MG tablet; TAKE ONE TABLET BY MOUTH EVERY DAY BEFORE BREAKFAST         Anastacio Aggarwal is a 79 y o  female who presents for annual exam  The patient has no complaints today  The patient is sexually active  The patient is not taking hormone replacement therapy  Patient denies post-menopausal vaginal bleeding  She reports improvement in urinary leakage with weight loss and decreased caffeine consumption  Menstrual History:  OB History        2    Para   2    Term   2            AB        Living   2       SAB        IAB        Ectopic        Multiple        Live Births   2                No LMP recorded (lmp unknown)  Patient is postmenopausal       Pap: 3/9/20 Neg/Neg   Mammo: 22 Neg   Dexa: 22 Normal   Colon: 5-6 yrs ago    The following portions of the patient's history were reviewed and updated as appropriate: allergies, current medications, past family history, past medical history, past social history, past surgical history and problem list     Review of Systems  Pertinent items are noted in HPI  Objective      /62 (BP Location: Right arm, Patient Position: Sitting, Cuff Size: Standard)   Ht 5' 4" (1 626 m)   Wt 68 6 kg (151 lb 3 2 oz)   LMP  (LMP Unknown)   BMI 25 95 kg/m²     General:   alert and oriented, in no acute distress   Heart:  Breasts: regular rate and rhythm   appear normal, no suspicious masses, no skin or nipple changes or axillary nodes     Lungs: effort normal   Abdomen: soft, non-tender, without masses or organomegaly   Vulva: normal   Vagina: normal mucosa   Cervix: no lesions   Uterus: normal size, mobile, non-tender   Adnexa: normal adnexa and no mass, fullness, tenderness

## 2023-02-09 ENCOUNTER — TELEPHONE (OUTPATIENT)
Dept: FAMILY MEDICINE CLINIC | Facility: CLINIC | Age: 68
End: 2023-02-09

## 2023-02-09 ENCOUNTER — TELEPHONE (OUTPATIENT)
Dept: OTHER | Facility: OTHER | Age: 68
End: 2023-02-09

## 2023-02-09 NOTE — TELEPHONE ENCOUNTER
Patient is calling regarding cancelling an appointment      Date/Time: 02/09 @2:15    Patient was rescheduled: YES [] NO [x]    Patient requesting call back to reschedule: YES [] NO [x]

## 2023-02-20 DIAGNOSIS — I10 BENIGN ESSENTIAL HYPERTENSION: ICD-10-CM

## 2023-02-20 DIAGNOSIS — I10 ESSENTIAL HYPERTENSION: ICD-10-CM

## 2023-02-20 RX ORDER — ATENOLOL 25 MG/1
25 TABLET ORAL DAILY
Qty: 90 TABLET | Refills: 1 | Status: SHIPPED | OUTPATIENT
Start: 2023-02-20

## 2023-02-26 DIAGNOSIS — F41.9 ANXIETY: ICD-10-CM

## 2023-02-27 RX ORDER — LORAZEPAM 0.5 MG/1
0.5 TABLET ORAL DAILY PRN
Qty: 30 TABLET | Refills: 0 | Status: SHIPPED | OUTPATIENT
Start: 2023-02-27

## 2023-02-27 NOTE — TELEPHONE ENCOUNTER
Medication Refill Request     Name LORazepam (ATIVAN) 0 5 mg tablet   Dose/Frequency Take 1 tablet (0 5 mg total) by mouth daily as needed for anxiety  Quantity 30 tablet  Verified pharmacy   [x]  Verified ordering Provider   [x]  Does patient have enough for the next 3 days?  Yes [x] No []

## 2023-05-04 ENCOUNTER — OFFICE VISIT (OUTPATIENT)
Dept: FAMILY MEDICINE CLINIC | Facility: CLINIC | Age: 68
End: 2023-05-04

## 2023-05-04 VITALS
TEMPERATURE: 97.8 F | DIASTOLIC BLOOD PRESSURE: 86 MMHG | SYSTOLIC BLOOD PRESSURE: 120 MMHG | OXYGEN SATURATION: 98 % | HEIGHT: 64 IN | HEART RATE: 65 BPM | WEIGHT: 151.8 LBS | BODY MASS INDEX: 25.92 KG/M2 | RESPIRATION RATE: 20 BRPM

## 2023-05-04 DIAGNOSIS — J01.00 ACUTE NON-RECURRENT MAXILLARY SINUSITIS: ICD-10-CM

## 2023-05-04 DIAGNOSIS — R05.3 CHRONIC COUGH: Primary | ICD-10-CM

## 2023-05-04 RX ORDER — AMOXICILLIN AND CLAVULANATE POTASSIUM 875; 125 MG/1; MG/1
1 TABLET, FILM COATED ORAL EVERY 12 HOURS SCHEDULED
Qty: 14 TABLET | Refills: 0 | Status: SHIPPED | OUTPATIENT
Start: 2023-05-04 | End: 2023-05-11

## 2023-05-04 NOTE — PROGRESS NOTES
Rg Yu 1955 female MRN: [id number]    Family Medicine Acute Visit    ASSESSMENT/PLAN  Problem List Items Addressed This Visit    None  Visit Diagnoses     Chronic cough    -  Primary    Relevant Orders    XR chest pa & lateral    Acute non-recurrent maxillary sinusitis        Relevant Medications    amoxicillin-clavulanate (AUGMENTIN) 875-125 mg per tablet            Follow up for AWV in August   If patients symptoms continue would refer to ENT    Future Appointments   Date Time Provider Jessica Saenz   8/7/2023  9:15 AM DO NIKOS Becker  [facility]   2/5/2024  2:00 PM MD KWADWO [facility]cd[facility]          SUBJECTIVE  CC: Nasal Congestion (For 2 months-OTC meds are not working )      HPI:  Rg Yu is a 79 y o  female who presents for sick visit  Reports congestion, cough, mucous x 2 months  Tried all the over the counter medications without relief  No fever  covid negative  Review of Systems   Constitutional: Negative for chills, fatigue and fever  HENT: Positive for congestion, postnasal drip, rhinorrhea and sinus pressure  Eyes: Negative for photophobia and visual disturbance  Respiratory: Positive for cough  Negative for shortness of breath  Cardiovascular: Negative for chest pain, palpitations and leg swelling  Gastrointestinal: Negative for abdominal pain, constipation, diarrhea, nausea and vomiting  Genitourinary: Negative for difficulty urinating and dysuria  Musculoskeletal: Negative for arthralgias and myalgias  Skin: Negative for color change and rash  Neurological: Negative for dizziness, weakness, light-headedness and headaches         Historical Information   The patient history was reviewed as follows:  Past Medical History:   Diagnosis Date    Anxiety     Arthritis     Disease of thyroid gland     Hypertension          Past Surgical History:   Procedure Laterality Date    BREAST CYST EXCISION Left 2012 Benign    BREAST LUMPECTOMY      THYROID SURGERY      Total Thyroidectomy     Family History   Problem Relation Age of Onset    Arthritis Mother     Lung cancer Mother 71    Lung cancer Father 71    No Known Problems Daughter     No Known Problems Daughter     Diabetes Maternal Grandmother         Mellitus    No Known Problems Maternal Grandfather     Diabetes Paternal Grandmother         Mellitus    No Known Problems Paternal Grandfather     Lung cancer Brother 79    COPD Brother     Cancer Brother 79        Unknown origin    No Known Problems Paternal Aunt     No Known Problems Paternal Aunt     No Known Problems Paternal Aunt       Social History   Social History     Substance and Sexual Activity   Alcohol Use Yes    Alcohol/week: 14 0 standard drinks    Types: 14 Glasses of wine per week    Comment: Social     Social History     Substance and Sexual Activity   Drug Use No     Social History     Tobacco Use   Smoking Status Former    Packs/day:     Years: 30 00    Pack years: 30 00    Types: Cigarettes    Quit date: 1997    Years since quittin 3   Smokeless Tobacco Never       Medications:     Current Outpatient Medications:     amoxicillin-clavulanate (AUGMENTIN) 875-125 mg per tablet, Take 1 tablet by mouth every 12 (twelve) hours for 7 days, Disp: 14 tablet, Rfl: 0    [place] Thyroid 15 MG tablet, TAKE ONE TABLET BY MOUTH EVERY DAY BEFORE BREAKFAST, Disp: , Rfl:     [place] Thyroid 60 MG tablet, Take 60 mg by mouth daily , Disp: , Rfl:     atenolol (TENORMIN) 25 mg tablet, Take 1 tablet (25 mg total) by mouth daily At 8 PM, Disp: 90 tablet, Rfl: 1    LORazepam (ATIVAN) 0 5 mg tablet, Take 1 tablet (0 5 mg total) by mouth daily as needed for anxiety, Disp: 30 tablet, Rfl: 0    No Known Allergies    OBJECTIVE  Vitals:   Vitals:    23 0956   BP: 120/86   BP Location: Left arm   Patient Position: Sitting   Cuff Size: Standard   Pulse: 65   Resp: 20   Temp: 97 8 °F "(36 6 °C)   TempSrc: Tympanic   SpO2: 98%   Weight: 68 9 kg (151 lb 12 8 oz)   Height: 5' 4\" (1 626 m)         Physical Exam  Constitutional:       Appearance: She is well-developed  HENT:      Head: Normocephalic and atraumatic  Right Ear: Ear canal normal  A middle ear effusion is present  Left Ear: Ear canal normal  A middle ear effusion is present  Nose: Congestion and rhinorrhea present  Mouth/Throat:      Pharynx: Posterior oropharyngeal erythema present  Eyes:      Extraocular Movements: Extraocular movements intact  Conjunctiva/sclera: Conjunctivae normal       Pupils: Pupils are equal, round, and reactive to light  Cardiovascular:      Rate and Rhythm: Normal rate and regular rhythm  Heart sounds: Normal heart sounds  Pulmonary:      Effort: Pulmonary effort is normal  No respiratory distress  Breath sounds: Normal breath sounds  No wheezing  Musculoskeletal:         General: No tenderness  Normal range of motion  Cervical back: Normal range of motion and neck supple  Skin:     General: Skin is warm and dry  Neurological:      Mental Status: She is alert and oriented to person, place, and time     Psychiatric:         Mood and Affect: Mood normal          Behavior: Behavior normal                     Lary Rodriguez DO    5/4/2023      "

## 2023-05-05 ENCOUNTER — APPOINTMENT (OUTPATIENT)
Dept: RADIOLOGY | Facility: CLINIC | Age: 68
End: 2023-05-05

## 2023-05-05 DIAGNOSIS — R05.3 CHRONIC COUGH: ICD-10-CM

## 2023-05-24 ENCOUNTER — TELEPHONE (OUTPATIENT)
Dept: GASTROENTEROLOGY | Facility: CLINIC | Age: 68
End: 2023-05-24

## 2023-05-24 ENCOUNTER — PREP FOR PROCEDURE (OUTPATIENT)
Dept: GASTROENTEROLOGY | Facility: CLINIC | Age: 68
End: 2023-05-24

## 2023-05-24 DIAGNOSIS — Z86.010 HISTORY OF COLON POLYPS: Primary | ICD-10-CM

## 2023-05-24 NOTE — TELEPHONE ENCOUNTER
"[facility] Assessment    Name: Nai Siegel  YOB: 1955  Last Height: 5' 4\" (1 626 m)  Last weight: 68 9 kg (151 lb 12 8 oz)  BMI: 26 06 kg/m²  Procedure: colonoscopy  Diagnosis: history of polyps  Date of procedure: 7/24/23  Prep:   Responsible :? Phone#: ?  Name completing form: Jacob Lee  Date form completed: 05/24/23      If the patient answers yes to any of these questions, schedule in a hospital  Are you pregnant: No  Do you rely on a wheelchair for mobility: No  Have you been diagnosed with End Stage Renal Disease (ESRD): No  Do you need oxygen during the day: No  Have you had a heart attack or stroke within the past three months: No  Have you had a seizure within the past three months: No  Have you ever been informed by anesthesia that you have a difficult airway: No  Additional Questions  Have you had any cardiac testing or are under the care of a Cardiologist (see cardiac list): No  Cardiac list:   Do you have an implanted cardiac defibrillator: No (Comment:  This patient should be scheduled in the hospital)    Have any bleeding problems, such as anemia or hemophilia (If patient has H&H result below 8, schedule in hospital   H&H must be within 30 days of procedure): No    Had an organ transplant within the past 3 months: No    Do you have any present infections: No  Do you get short of breath when walking a few blocks: Yes  Have you been diagnosed with diabetes: No  Comments (provide cardiac provider information if applicable):        "

## 2023-05-24 NOTE — TELEPHONE ENCOUNTER
Scheduled date of colonoscopy (as of today):7/24/23    Physician performing colonoscopy:Dr Jarrett Gray    Location of colonoscopy:[facility]    Clearances: N/A

## 2023-05-25 ENCOUNTER — HOSPITAL ENCOUNTER (OUTPATIENT)
Dept: NON INVASIVE DIAGNOSTICS | Facility: HOSPITAL | Age: 68
Discharge: HOME | End: 2023-05-25

## 2023-05-25 DIAGNOSIS — R07.9 CHEST PAIN, UNSPECIFIED TYPE: ICD-10-CM

## 2023-05-26 ENCOUNTER — TELEPHONE (OUTPATIENT)
Dept: CARDIOLOGY CLINIC | Facility: CLINIC | Age: 68
End: 2023-05-26

## 2023-05-26 LAB
CHEST PAIN STATEMENT: NORMAL
MAX DIASTOLIC BP: 79 MMHG
MAX HEART RATE: 133 BPM
MAX HR PERCENT: 86 %
MAX HR: 133 BPM
MAX PREDICTED HEART RATE: 153 BPM
MAX. SYSTOLIC BP: 167 MMHG
PROTOCOL NAME: NORMAL
RATE PRESSURE PRODUCT: NORMAL
REASON FOR TERMINATION: NORMAL
SL CV STRESS STAGE REACHED: 3
STRESS BASELINE HR: 69 BPM
STRESS PEAK HR: 133 BPM
STRESS POST ESTIMATED WORKLOAD: 10.1 METS
STRESS POST EXERCISE DUR MIN: 9 MIN
STRESS POST EXERCISE DUR SEC: 0 SEC
STRESS POST PEAK BP: 167 MMHG
TARGET HR FORMULA: NORMAL
TEST INDICATION: NORMAL
TIME IN EXERCISE PHASE: NORMAL

## 2023-06-19 DIAGNOSIS — F41.9 ANXIETY: ICD-10-CM

## 2023-06-19 RX ORDER — LORAZEPAM 0.5 MG/1
0.5 TABLET ORAL DAILY PRN
Qty: 30 TABLET | Refills: 0 | Status: SHIPPED | OUTPATIENT
Start: 2023-06-19

## 2023-07-10 ENCOUNTER — TELEPHONE (OUTPATIENT)
Dept: GASTROENTEROLOGY | Facility: CLINIC | Age: 68
End: 2023-07-10

## 2023-07-10 DIAGNOSIS — Z12.11 SCREENING FOR COLON CANCER: Primary | ICD-10-CM

## 2023-07-10 NOTE — TELEPHONE ENCOUNTER
Procedure confirmed  Colonoscopy     Via: Voice mail    Instructions given: Foz[place]     Prep Given: Golytely    Call the office if there are any questions.

## 2023-07-23 ENCOUNTER — NURSE TRIAGE (OUTPATIENT)
Dept: OTHER | Facility: OTHER | Age: 68
End: 2023-07-23

## 2023-07-23 ENCOUNTER — ANESTHESIA EVENT (OUTPATIENT)
Dept: ANESTHESIOLOGY | Facility: AMBULATORY SURGERY CENTER | Age: 68
End: 2023-07-23

## 2023-07-23 ENCOUNTER — ANESTHESIA (OUTPATIENT)
Dept: ANESTHESIOLOGY | Facility: AMBULATORY SURGERY CENTER | Age: 68
End: 2023-07-23

## 2023-07-23 NOTE — TELEPHONE ENCOUNTER
Patient states she does not feel well. She feels like she's developing a cold and not sure if she will feel worse tomorrow and unsure if she should proceed with scheduled procedure tomorrow. Patient is calling regarding cancelling a procedure.     Date/Time: 7/24/23 8:30am    Performing Physician: Dr. Naomi Rivera    Performing Physician/Nursing Supervisor Notified?:  YES [x] NO []    Patient requesting call back to reschedule: YES [x] NO []

## 2023-07-23 NOTE — TELEPHONE ENCOUNTER
Regarding: Colonoscopy Question, Cold Symptoms Started  ----- Message from Laurie Caro sent at 7/23/2023  1:18 PM EDT -----  " I am Scheduled for a Colonoscopy tomorrow morning, I started having Stuffy Nose, Congestion in my Chest, Headache, Coughing.  I need to start my prep soon."

## 2023-07-24 ENCOUNTER — TELEPHONE (OUTPATIENT)
Dept: GASTROENTEROLOGY | Facility: CLINIC | Age: 68
End: 2023-07-24

## 2023-07-24 ENCOUNTER — TELEPHONE (OUTPATIENT)
Dept: FAMILY MEDICINE CLINIC | Facility: CLINIC | Age: 68
End: 2023-07-24

## 2023-07-24 DIAGNOSIS — Z12.11 SCREENING FOR COLON CANCER: Primary | ICD-10-CM

## 2023-07-24 NOTE — TELEPHONE ENCOUNTER
Scheduled date of colonoscopy (as of today):08/14/2023  Physician performing colonoscopy: Dr Michelle Almonte  Location of colonoscopy:[facility]  Bowel prep: mansi  Patient has instructions  Clearances: none

## 2023-07-24 NOTE — TELEPHONE ENCOUNTER
Spoke to pt via telephone, advised symptomatic care with OTC medications and that Paxlovid is also an option. Discussed paxlovid, pt will c/b if she would like to take it.

## 2023-07-24 NOTE — TELEPHONE ENCOUNTER
Patient called. Patient states she started feeling sick on Friday. She took a covid test today and it was positive. Patient has congestion and a sore throat. Patient was wondering what you would advise she does with these symptoms.     Please advise  Thank you

## 2023-07-24 NOTE — TELEPHONE ENCOUNTER
Patient rescheduled colonoscopy. Scheduled date of colonoscopy (as of today):08/14/2023  Physician performing colonoscopy: Dr Valarie Oates  Location of colonoscopy:[address]  Bowel prep: mansi  Patient has instructions  Clearances: none    Please send a new prescription for golytely to [facility] pharmacy.

## 2023-07-27 ENCOUNTER — TELEPHONE (OUTPATIENT)
Dept: GASTROENTEROLOGY | Facility: CLINIC | Age: 68
End: 2023-07-27

## 2023-07-31 DIAGNOSIS — Z12.11 SCREENING FOR COLON CANCER: Primary | ICD-10-CM

## 2023-08-02 NOTE — TELEPHONE ENCOUNTER
Pt has Golytely, it was resent for some reason that is why it was denied.  Pt has prep and instructions and is good to go for procedure

## 2023-08-04 NOTE — TELEPHONE ENCOUNTER
Patient called in regards to the Golytely states that she had the prescription at home but her spouse accidentally threw it away is asking for a new prescription to be sent to the pharmacy.

## 2023-08-13 ENCOUNTER — ANESTHESIA EVENT (OUTPATIENT)
Dept: ANESTHESIOLOGY | Facility: AMBULATORY SURGERY CENTER | Age: 68
End: 2023-08-13

## 2023-08-13 ENCOUNTER — ANESTHESIA (OUTPATIENT)
Dept: ANESTHESIOLOGY | Facility: AMBULATORY SURGERY CENTER | Age: 68
End: 2023-08-13

## 2023-08-14 ENCOUNTER — ANESTHESIA (OUTPATIENT)
Dept: GASTROENTEROLOGY | Facility: AMBULATORY SURGERY CENTER | Age: 68
End: 2023-08-14

## 2023-08-14 ENCOUNTER — HOSPITAL ENCOUNTER (OUTPATIENT)
Dept: GASTROENTEROLOGY | Facility: AMBULATORY SURGERY CENTER | Age: 68
Discharge: HOME | End: 2023-08-14
Payer: MEDICARE

## 2023-08-14 ENCOUNTER — ANESTHESIA EVENT (OUTPATIENT)
Dept: GASTROENTEROLOGY | Facility: AMBULATORY SURGERY CENTER | Age: 68
End: 2023-08-14

## 2023-08-14 VITALS
SYSTOLIC BLOOD PRESSURE: 136 MMHG | WEIGHT: 151 LBS | OXYGEN SATURATION: 97 % | RESPIRATION RATE: 14 BRPM | DIASTOLIC BLOOD PRESSURE: 82 MMHG | HEIGHT: 64 IN | HEART RATE: 59 BPM | BODY MASS INDEX: 25.78 KG/M2 | TEMPERATURE: 98.9 F

## 2023-08-14 DIAGNOSIS — Z86.010 HISTORY OF COLON POLYPS: ICD-10-CM

## 2023-08-14 PROCEDURE — G0121 COLON CA SCRN NOT HI RSK IND: HCPCS | Performed by: STUDENT IN AN ORGANIZED HEALTH CARE EDUCATION/TRAINING PROGRAM

## 2023-08-14 RX ORDER — PROPOFOL 10 MG/ML
INJECTION, EMULSION INTRAVENOUS AS NEEDED
Status: DISCONTINUED | OUTPATIENT
Start: 2023-08-14 | End: 2023-08-14

## 2023-08-14 RX ORDER — PROPOFOL 10 MG/ML
INJECTION, EMULSION INTRAVENOUS CONTINUOUS PRN
Status: DISCONTINUED | OUTPATIENT
Start: 2023-08-14 | End: 2023-08-14

## 2023-08-14 RX ORDER — LIDOCAINE HYDROCHLORIDE 10 MG/ML
INJECTION, SOLUTION EPIDURAL; INFILTRATION; INTRACAUDAL; PERINEURAL AS NEEDED
Status: DISCONTINUED | OUTPATIENT
Start: 2023-08-14 | End: 2023-08-14

## 2023-08-14 RX ORDER — SODIUM CHLORIDE, SODIUM LACTATE, POTASSIUM CHLORIDE, CALCIUM CHLORIDE 600; 310; 30; 20 MG/100ML; MG/100ML; MG/100ML; MG/100ML
50 INJECTION, SOLUTION INTRAVENOUS CONTINUOUS
Status: DISCONTINUED | OUTPATIENT
Start: 2023-08-14 | End: 2023-08-18 | Stop reason: HOSPADM

## 2023-08-14 RX ADMIN — LIDOCAINE HYDROCHLORIDE 50 MG: 10 INJECTION, SOLUTION EPIDURAL; INFILTRATION; INTRACAUDAL; PERINEURAL at 10:08

## 2023-08-14 RX ADMIN — PROPOFOL 100 MG: 10 INJECTION, EMULSION INTRAVENOUS at 10:08

## 2023-08-14 RX ADMIN — PROPOFOL 120 MCG/KG/MIN: 10 INJECTION, EMULSION INTRAVENOUS at 10:08

## 2023-08-14 RX ADMIN — SODIUM CHLORIDE, SODIUM LACTATE, POTASSIUM CHLORIDE, CALCIUM CHLORIDE 50 ML/HR: 600; 310; 30; 20 INJECTION, SOLUTION INTRAVENOUS at 09:51

## 2023-08-14 NOTE — H&P
History and Physical - [facility]  Martin Yanez 79 y.o. female MRN: [id number]    HPI: Martin Yanez is a 79 y.o. female who presents for CRC screening, last colonoscopy  with no polyps    REVIEW OF SYSTEMS: Per the HPI, and otherwise unremarkable.     Historical Information   Past Medical History:   Diagnosis Date   • Anxiety    • Arthritis    • Disease of thyroid gland    • Hypertension      Past Surgical History:   Procedure Laterality Date   • BREAST CYST EXCISION Left     Benign   • BREAST LUMPECTOMY     • THYROID SURGERY      Total Thyroidectomy     Social History   Social History     Substance and Sexual Activity   Alcohol Use Yes   • Alcohol/week: 14.0 standard drinks of alcohol   • Types: 14 Glasses of wine per week    Comment: Social     Social History     Substance and Sexual Activity   Drug Use No     Social History     Tobacco Use   Smoking Status Former   • Packs/day: 1.00   • Years: 30.00   • Total pack years: 30.00   • Types: Cigarettes   • Quit date: 1997   • Years since quittin.6   Smokeless Tobacco Never     Family History   Problem Relation Age of Onset   • Arthritis Mother    • Lung cancer Mother 71   • Lung cancer Father 71   • No Known Problems Daughter    • No Known Problems Daughter    • Diabetes Maternal Grandmother         Mellitus   • No Known Problems Maternal Grandfather    • Diabetes Paternal Grandmother         Mellitus   • No Known Problems Paternal Grandfather    • Lung cancer Brother 79   • COPD Brother    • Cancer Brother 79        Unknown origin   • No Known Problems Paternal Aunt    • No Known Problems Paternal Aunt    • No Known Problems Paternal Aunt        Meds/Allergies       Current Outpatient Medications:   •  [place] Thyroid 15 MG tablet  •  [place] Thyroid 60 MG tablet  •  atenolol (TENORMIN) 25 mg tablet  •  LORazepam (ATIVAN) 0.5 mg tablet  •  polyethylene glycol (GOLYTELY) 4000 mL solution  •  polyethylene glycol (GOLYTELY) 4000 mL solution    Current Facility-Administered Medications:   •  lactated ringers infusion, 50 mL/hr, Intravenous, Continuous, Continue from Pre-op at 08/14/23 0956    No Known Allergies    Objective     /82   Pulse 57   Temp 98.9 °F (37.2 °C) (Temporal)   Resp 19   Ht 5' 4" (1.626 m)   Wt 68.5 kg (151 lb)   LMP  (LMP Unknown)   SpO2 96%   BMI 25.92 kg/m²     PHYSICAL EXAM    Gen: NAD AAOx3  Head: Normocephalic, Atraumatic  CV: S1S2 RRR no m/r/g  CHEST: Clear b/l no c/r/w  ABD: soft, +BS NT/ND  EXT: no edema    ASSESSMENT/PLAN:  This is a 79y.o. year old female here for colonoscopy, and she is stable and optimized for her procedure.

## 2023-08-14 NOTE — ANESTHESIA PREPROCEDURE EVALUATION
Procedure:  COLONOSCOPY    Relevant Problems   CARDIO   (+) Benign essential hypertension   (+) Chest pain   (+) Mixed hyperlipidemia      ENDO   (+) Postprocedural hypothyroidism      MUSCULOSKELETAL   (+) Cervical spondylosis   (+) Chronic bilateral low back pain with bilateral sciatica      NEURO/PSYCH   (+) Anxiety   (+) Chronic bilateral low back pain with bilateral sciatica   Covid with URI symptoms 3 weeks ago. Resolved, Back to normal activity     Physical Exam    Airway    Mallampati score: II  TM Distance: >3 FB  Neck ROM: full     Dental   No notable dental hx     Cardiovascular      Pulmonary      Other Findings        Anesthesia Plan  ASA Score- 2     Anesthesia Type- IV sedation with anesthesia with ASA Monitors. Additional Monitors:   Airway Plan:           Plan Factors-Exercise tolerance (METS): >4 METS. Chart reviewed. Patient summary reviewed. Patient is not a current smoker. Induction- intravenous. Postoperative Plan-     Informed Consent- Anesthetic plan and risks discussed with patient. I personally reviewed this patient with the CRNA. Discussed and agreed on the Anesthesia Plan with the CRNA. Scott Yepez
No pertinent past medical history

## 2023-08-14 NOTE — ANESTHESIA POSTPROCEDURE EVALUATION
Post-Op Assessment Note    CV Status:  Stable  Pain Score: 0    Pain management: adequate     Mental Status:  Alert and awake   Hydration Status:  Euvolemic   PONV Controlled:  Controlled   Airway Patency:  Patent      Post Op Vitals Reviewed: Yes      Staff: CRNA         No notable events documented.     BP   120/70   Temp      Pulse   58   Resp   16   SpO2   97%

## 2023-08-16 ENCOUNTER — NURSE TRIAGE (OUTPATIENT)
Age: 68
End: 2023-08-16

## 2023-08-16 NOTE — TELEPHONE ENCOUNTER
Called patient and told her we would not be able to call anything in for her without her being seen. [place] and has no car. Went to urgent care but said the wait was to long.

## 2023-08-16 NOTE — TELEPHONE ENCOUNTER
Patient called in to report severe burning pain and lower abdominal pressure starting Tuesday 8/14 afternoon post colonoscopy. Patient suspects UTI. Currently in [place]. Requesting order for antibiotic; [facility] [place] added to profile. Patient was at urgent care but left due to pain and extensive wait for service. Please follow up with patient. Reason for Disposition  • SEVERE pain with urination    Additional Information  • Pain or burning with passing urine (urination) and female    Answer Assessment - Initial Assessment Questions  1. SYMPTOM: "What's the main symptom you're concerned about?" (e.g., frequency, incontinence)      Burning pain and pressure, cramping jeffrey lower abdomen  2. ONSET: "When did the symptoms start?"      Tuesday 8/15/23  3. PAIN: "Is there any pain?" If Yes, ask: "How bad is it?" (Scale: 1-10; mild, moderate, severe)      Severe (9-10)  4. CAUSE: "What do you think is causing the symptoms?"      UTI  5. OTHER SYMPTOMS: "Do you have any other symptoms?" (e.g., fever, flank pain, blood in urine, pain with urination)      Slight blood post wiping possible from colonoscopy 8/14/23  6.  PREGNANCY: "Is there any chance you are pregnant?" "When was your last menstrual period?"     Post menopausal    Protocols used: URINATION PAIN - FEMALE-ADULT-[place], URINARY SYMPTOMS-ADULT-[place]

## 2023-08-24 DIAGNOSIS — I10 BENIGN ESSENTIAL HYPERTENSION: ICD-10-CM

## 2023-08-24 DIAGNOSIS — I10 ESSENTIAL HYPERTENSION: ICD-10-CM

## 2023-08-24 RX ORDER — ATENOLOL 25 MG/1
25 TABLET ORAL DAILY
Qty: 90 TABLET | Refills: 1 | Status: SHIPPED | OUTPATIENT
Start: 2023-08-24

## 2023-09-07 ENCOUNTER — OFFICE VISIT (OUTPATIENT)
Dept: FAMILY MEDICINE CLINIC | Facility: CLINIC | Age: 68
End: 2023-09-07
Payer: MEDICARE

## 2023-09-07 VITALS
TEMPERATURE: 99 F | DIASTOLIC BLOOD PRESSURE: 82 MMHG | BODY MASS INDEX: 25.46 KG/M2 | HEIGHT: 65 IN | OXYGEN SATURATION: 96 % | RESPIRATION RATE: 16 BRPM | WEIGHT: 152.8 LBS | SYSTOLIC BLOOD PRESSURE: 128 MMHG | HEART RATE: 69 BPM

## 2023-09-07 DIAGNOSIS — I10 BENIGN ESSENTIAL HYPERTENSION: ICD-10-CM

## 2023-09-07 DIAGNOSIS — K21.9 GASTROESOPHAGEAL REFLUX DISEASE, UNSPECIFIED WHETHER ESOPHAGITIS PRESENT: ICD-10-CM

## 2023-09-07 DIAGNOSIS — Z13.1 SCREENING FOR DIABETES MELLITUS: ICD-10-CM

## 2023-09-07 DIAGNOSIS — Z13.6 SCREENING FOR CARDIOVASCULAR CONDITION: ICD-10-CM

## 2023-09-07 DIAGNOSIS — E78.2 MIXED HYPERLIPIDEMIA: ICD-10-CM

## 2023-09-07 DIAGNOSIS — Z00.00 MEDICARE ANNUAL WELLNESS VISIT, SUBSEQUENT: Primary | ICD-10-CM

## 2023-09-07 DIAGNOSIS — F41.9 ANXIETY: ICD-10-CM

## 2023-09-07 DIAGNOSIS — E89.0 POSTPROCEDURAL HYPOTHYROIDISM: ICD-10-CM

## 2023-09-07 PROBLEM — M47.812 CERVICAL SPONDYLOSIS WITHOUT MYELOPATHY: Status: ACTIVE | Noted: 2022-08-22

## 2023-09-07 PROBLEM — M25.562 ACUTE PAIN OF BOTH KNEES: Status: RESOLVED | Noted: 2019-05-31 | Resolved: 2023-09-07

## 2023-09-07 PROBLEM — M25.561 ACUTE PAIN OF BOTH KNEES: Status: RESOLVED | Noted: 2019-05-31 | Resolved: 2023-09-07

## 2023-09-07 PROBLEM — R07.9 CHEST PAIN: Status: RESOLVED | Noted: 2022-08-04 | Resolved: 2023-09-07

## 2023-09-07 PROBLEM — M60.811 OTHER MYOSITIS, RIGHT SHOULDER: Status: ACTIVE | Noted: 2021-04-12

## 2023-09-07 PROCEDURE — G0439 PPPS, SUBSEQ VISIT: HCPCS | Performed by: FAMILY MEDICINE

## 2023-09-07 RX ORDER — LORAZEPAM 0.5 MG/1
0.5 TABLET ORAL DAILY PRN
Qty: 30 TABLET | Refills: 0 | Status: SHIPPED | OUTPATIENT
Start: 2023-09-07

## 2023-09-07 RX ORDER — PANTOPRAZOLE SODIUM 40 MG/1
40 TABLET, DELAYED RELEASE ORAL
Qty: 30 TABLET | Refills: 5 | Status: SHIPPED | OUTPATIENT
Start: 2023-09-07 | End: 2024-03-05

## 2023-09-07 NOTE — PROGRESS NOTES
Assessment and Plan:     Problem List Items Addressed This Visit        Digestive    GERD (gastroesophageal reflux disease)    Relevant Medications    pantoprazole (PROTONIX) 40 mg tablet       Endocrine    Postprocedural hypothyroidism     Follows with endocrine in [place] who manages her medications. Relevant Orders    Lipid panel    Comprehensive metabolic panel    CBC and differential    TSH, 3rd generation with Free T4 reflex    UA (URINE) with reflex to Scope       Cardiovascular and Mediastinum    Benign essential hypertension    Relevant Orders    Lipid panel    Comprehensive metabolic panel    CBC and differential    TSH, 3rd generation with Free T4 reflex    UA (URINE) with reflex to Scope       Other    Mixed hyperlipidemia    Relevant Orders    Lipid panel    Comprehensive metabolic panel    CBC and differential    TSH, 3rd generation with Free T4 reflex    UA (URINE) with reflex to Scope    Anxiety    Relevant Medications    LORazepam (ATIVAN) 0.5 mg tablet    Other Relevant Orders    Lipid panel    Comprehensive metabolic panel    CBC and differential    TSH, 3rd generation with Free T4 reflex    UA (URINE) with reflex to Scope    BMI 25.0-25.9,adult     BMI Counseling: Body mass index is 25.51 kg/m². The BMI is above normal. Nutrition recommendations include reducing portion sizes, decreasing overall calorie intake and 3-5 servings of fruits/vegetables daily. Exercise recommendations include vigorous aerobic physical activity for 75 minutes/week.         Other Visit Diagnoses     Medicare annual wellness visit, subsequent    -  Primary    Relevant Orders    Lipid panel    Comprehensive metabolic panel    CBC and differential    TSH, 3rd generation with Free T4 reflex    UA (URINE) with reflex to Scope    Screening for diabetes mellitus        Relevant Orders    Lipid panel    Comprehensive metabolic panel    Screening for cardiovascular condition        Relevant Orders    Lipid panel Comprehensive metabolic panel           Preventive health issues were discussed with patient, and age appropriate screening tests were ordered as noted in patient's After Visit Summary. Personalized health advice and appropriate referrals for health education or preventive services given if needed, as noted in patient's After Visit Summary. History of Present Illness:     Patient presents for a Medicare Wellness Visit    HPI   Patient Care Team:  Celia Young DO as PCP - General (Family Medicine)  Luis Fernando Abdi MD  [id number]ario MD     Review of Systems:     Review of Systems   Constitutional: Negative for chills, fatigue and fever. HENT: Negative for congestion, postnasal drip, rhinorrhea and sinus pressure. Eyes: Negative for photophobia and visual disturbance. Respiratory: Negative for cough and shortness of breath. Cardiovascular: Negative for chest pain, palpitations and leg swelling. Gastrointestinal: Negative for abdominal pain, constipation, diarrhea, nausea and vomiting. Genitourinary: Negative for difficulty urinating and dysuria. Musculoskeletal: Negative for arthralgias and myalgias. Skin: Negative for color change and rash. Neurological: Negative for dizziness, weakness, light-headedness and headaches.         Problem List:     Patient Active Problem List   Diagnosis   • Postprocedural hypothyroidism   • Mixed hyperlipidemia   • Benign essential hypertension   • Anxiety   • Chronic fatigue   • Encounter for screening colonoscopy   • Sleep disorder   • Elevated LFTs   • Chronic bilateral low back pain with bilateral sciatica   • Other myositis, right shoulder   • Neck pain   • BMI 25.0-25.9,adult   • Cervical spondylosis without myelopathy   • GERD (gastroesophageal reflux disease)      Past Medical and Surgical History:     Past Medical History:   Diagnosis Date   • Anxiety    • Arthritis    • Disease of thyroid gland    • Hypertension      Past Surgical History:   Procedure Laterality Date   • BREAST CYST EXCISION Left     Benign   • BREAST LUMPECTOMY     • THYROID SURGERY      Total Thyroidectomy      Family History:     Family History   Problem Relation Age of Onset   • Arthritis Mother    • Lung cancer Mother 71   • Lung cancer Father 71   • No Known Problems Daughter    • No Known Problems Daughter    • Diabetes Maternal Grandmother         Mellitus   • No Known Problems Maternal Grandfather    • Diabetes Paternal Grandmother         Mellitus   • No Known Problems Paternal Grandfather    • Lung cancer Brother 79   • COPD Brother    • Cancer Brother 79        Unknown origin   • No Known Problems Paternal Aunt    • No Known Problems Paternal Aunt    • No Known Problems Paternal Aunt       Social History:     Social History     Socioeconomic History   • Marital status:      Spouse name: None   • Number of children: 2   • Years of education: None   • Highest education level: None   Occupational History   • None   Tobacco Use   • Smoking status: Former     Packs/day: 1.00     Years: 30.00     Total pack years: 30.00     Types: Cigarettes     Quit date: 1997     Years since quittin.6   • Smokeless tobacco: Never   Vaping Use   • Vaping Use: Never used   Substance and Sexual Activity   • Alcohol use:  Yes     Alcohol/week: 14.0 standard drinks of alcohol     Types: 14 Glasses of wine per week     Comment: Social   • Drug use: No   • Sexual activity: Yes     Partners: Male     Birth control/protection: Post-menopausal, None   Other Topics Concern   • None   Social History Narrative    Caffeine use     Social Determinants of Health     Financial Resource Strain: Low Risk  (2023)    Overall Financial Resource Strain (CARDIA)    • Difficulty of Paying Living Expenses: Not hard at all   Food Insecurity: No Food Insecurity (2021)    Hunger Vital Sign    • Worried About Running Out of Food in the Last Year: Never true    • Ran Out of Food in the Last Year: Never true   Transportation Needs: No Transportation Needs (9/7/2023)    PRAPARE - Transportation    • Lack of Transportation (Medical): No    • Lack of Transportation (Non-Medical): No   Physical Activity: Inactive (4/12/2021)    Exercise Vital Sign    • Days of Exercise per Week: 0 days    • Minutes of Exercise per Session: 0 min   Stress: Stress Concern Present (4/12/2021)    [facility]    • Feeling of Stress : To some extent   Social Connections: Socially Isolated (4/12/2021)    Social Connection and Isolation Panel [NHANES]    • Frequency of Communication with Friends and Family: More than three times a week    • Frequency of Social Gatherings with Friends and Family: More than three times a week    • Attends Druze Services: Never    • Active Member of Clubs or Organizations: No    • Attends Club or Organization Meetings: Never    • Marital Status:    Intimate Partner Violence: Not At Risk (9/7/2023)    Humiliation, Afraid, Rape, and Kick questionnaire    • Fear of Current or Ex-Partner: No    • Emotionally Abused: No    • Physically Abused: No    • Sexually Abused: No   Housing Stability: Not on file      Medications and Allergies:     Current Outpatient Medications   Medication Sig Dispense Refill   • [place] Thyroid 15 MG tablet TAKE ONE TABLET BY MOUTH EVERY DAY BEFORE BREAKFAST     • [place] Thyroid 60 MG tablet Take 60 mg by mouth daily      • atenolol (TENORMIN) 25 mg tablet Take 1 tablet (25 mg total) by mouth daily At 8 PM 90 tablet 1   • LORazepam (ATIVAN) 0.5 mg tablet Take 1 tablet (0.5 mg total) by mouth daily as needed for anxiety 30 tablet 0   • pantoprazole (PROTONIX) 40 mg tablet Take 1 tablet (40 mg total) by mouth daily before breakfast 30 tablet 5     No current facility-administered medications for this visit.      No Known Allergies   Immunizations:     Immunization History   Administered Date(s) Administered   • INFLUENZA 11/19/2013   • Influenza, seasonal, injectable 11/19/2013   • Pneumococcal Conjugate 13-Valent 06/15/2021, 06/15/2021   • Pneumococcal Conjugate Vaccine 20-valent (Pcv20), Polysace 08/04/2022   • Tdap 06/15/2021, 06/15/2021      Health Maintenance:         Topic Date Due   • Cervical Cancer Screening  03/09/2021   • Breast Cancer Screening: Mammogram  09/16/2023   • DXA SCAN  09/16/2027   • Colorectal Cancer Screening  08/11/2033   • Hepatitis C Screening  Completed         Topic Date Due   • COVID-19 Vaccine (1) Never done   • Influenza Vaccine (1) 09/01/2023      Medicare Screening Tests and Risk Assessments:     Hollie Echeverria is here for her Subsequent Wellness visit. Last Medicare Wellness visit information reviewed, patient interviewed, no change since last AWV. Depression Screening:   PHQ-2 Score: 0      PREVENTIVE SCREENINGS      Cardiovascular Screening:    General: Screening Not Indicated and History Lipid Disorder      Colorectal Cancer Screening:     General: Screening Current      Breast Cancer Screening:     General: Screening Current      Cervical Cancer Screening:    General: Screening Not Indicated      Osteoporosis Screening:    General: Screening Current      Lung Cancer Screening:     General: Screening Not Indicated      Hepatitis C Screening:    General: Screening Current    No results found. Physical Exam:     /82   Pulse 69   Temp 99 °F (37.2 °C) (Tympanic)   Resp 16   Ht 5' 4.9" (1.648 m)   Wt 69.3 kg (152 lb 12.8 oz)   LMP  (LMP Unknown)   SpO2 96%   BMI 25.51 kg/m²     Physical Exam  Constitutional:       General: She is not in acute distress. Appearance: Normal appearance. She is not ill-appearing, toxic-appearing or diaphoretic. HENT:      Head: Normocephalic and atraumatic. Right Ear: Tympanic membrane and ear canal normal.      Left Ear: Tympanic membrane and ear canal normal.      Nose: Nose normal. No congestion.       Mouth/Throat: Mouth: Mucous membranes are moist.      Pharynx: Oropharynx is clear. No oropharyngeal exudate. Eyes:      Extraocular Movements: Extraocular movements intact. Conjunctiva/sclera: Conjunctivae normal.      Pupils: Pupils are equal, round, and reactive to light. Cardiovascular:      Rate and Rhythm: Normal rate and regular rhythm. Pulses: Normal pulses. Heart sounds: No murmur heard. Pulmonary:      Effort: Pulmonary effort is normal.      Breath sounds: Normal breath sounds. No wheezing, rhonchi or rales. Abdominal:      General: Bowel sounds are normal. There is no distension. Palpations: Abdomen is soft. Tenderness: There is no abdominal tenderness. Musculoskeletal:         General: No swelling or tenderness. Normal range of motion. Cervical back: Normal range of motion and neck supple. Skin:     General: Skin is warm and dry. Capillary Refill: Capillary refill takes less than 2 seconds. Neurological:      General: No focal deficit present. Mental Status: She is alert and oriented to person, place, and time. Cranial Nerves: No cranial nerve deficit. Psychiatric:         Mood and Affect: Mood normal.         Behavior: Behavior normal.         Thought Content:  Thought content normal.          [place], DO

## 2023-09-07 NOTE — PATIENT INSTRUCTIONS
Medicare Preventive Visit Patient Instructions  Thank you for completing your Welcome to Medicare Visit or Medicare Annual Wellness Visit today. Your next wellness visit will be due in one year (9/7/2024). The screening/preventive services that you may require over the next 5-10 years are detailed below. Some tests may not apply to you based off risk factors and/or age. Screening tests ordered at today's visit but not completed yet may show as past due. Also, please note that scanned in results may not display below. Preventive Screenings:  Service Recommendations Previous Testing/Comments   Colorectal Cancer Screening  * Colonoscopy    * Fecal Occult Blood Test (FOBT)/Fecal Immunochemical Test (FIT)  * Fecal DNA/Cologuard Test  * Flexible Sigmoidoscopy Age: 43-73 years old   Colonoscopy: every 10 years (may be performed more frequently if at higher risk)  OR  FOBT/FIT: every 1 year  OR  Cologuard: every 3 years  OR  Sigmoidoscopy: every 5 years  Screening may be recommended earlier than age 39 if at higher risk for colorectal cancer. Also, an individualized decision between you and your healthcare provider will decide whether screening between the ages of 77-80 would be appropriate. Colonoscopy: 08/14/2023  FOBT/FIT: Not on file  Cologuard: Not on file  Sigmoidoscopy: Not on file          Breast Cancer Screening Age: 36 years old  Frequency: every 1-2 years  Not required if history of left and right mastectomy Mammogram: 09/16/2022        Cervical Cancer Screening Between the ages of 21-29, pap smear recommended once every 3 years. Between the ages of 32-69, can perform pap smear with HPV co-testing every 5 years.    Recommendations may differ for women with a history of total hysterectomy, cervical cancer, or abnormal pap smears in past. Pap Smear: 01/30/2023        Hepatitis C Screening Once for adults born between 1945 and 1965  More frequently in patients at high risk for Hepatitis C Hep C Antibody: 01/29/2019        Diabetes Screening 1-2 times per year if you're at risk for diabetes or have pre-diabetes Fasting glucose: No results in last 5 years (No results in last 5 years)  A1C: No results in last 5 years (No results in last 5 years)      Cholesterol Screening Once every 5 years if you don't have a lipid disorder. May order more often based on risk factors. Lipid panel: 08/05/2022          Other Preventive Screenings Covered by Medicare:  1. Abdominal Aortic Aneurysm (AAA) Screening: covered once if your at risk. You're considered to be at risk if you have a family history of AAA. 2. Lung Cancer Screening: covers low dose CT scan once per year if you meet all of the following conditions: (1) Age 48-67; (2) No signs or symptoms of lung cancer; (3) Current smoker or have quit smoking within the last 15 years; (4) You have a tobacco smoking history of at least 20 pack years (packs per day multiplied by number of years you smoked); (5) You get a written order from a healthcare provider. 3. Glaucoma Screening: covered annually if you're considered high risk: (1) You have diabetes OR (2) Family history of glaucoma OR (3)  aged 48 and older OR (3)  American aged 72 and older  3. Osteoporosis Screening: covered every 2 years if you meet one of the following conditions: (1) You're estrogen deficient and at risk for osteoporosis based off medical history and other findings; (2) Have a vertebral abnormality; (3) On glucocorticoid therapy for more than 3 months; (4) Have primary hyperparathyroidism; (5) On osteoporosis medications and need to assess response to drug therapy. · Last bone density test (DXA Scan): 09/16/2022.  5. HIV Screening: covered annually if you're between the age of 15-65. Also covered annually if you are younger than 13 and older than 72 with risk factors for HIV infection. For pregnant patients, it is covered up to 3 times per pregnancy.     Immunizations:  Immunization Recommendations   Influenza Vaccine Annual influenza vaccination during flu season is recommended for all persons aged >= 6 months who do not have contraindications   Pneumococcal Vaccine   * Pneumococcal conjugate vaccine = PCV13 (Prevnar 13), PCV15 (Vaxneuvance), PCV20 (Prevnar 20)  * Pneumococcal polysaccharide vaccine = PPSV23 (Pneumovax) Adults 20-63 years old: 1-3 doses may be recommended based on certain risk factors  Adults 72 years old: 1-2 doses may be recommended based off what pneumonia vaccine you previously received   Hepatitis B Vaccine 3 dose series if at intermediate or high risk (ex: diabetes, end stage renal disease, liver disease)   Tetanus (Td) Vaccine - COST NOT COVERED BY MEDICARE PART B Following completion of primary series, a booster dose should be given every 10 years to maintain immunity against tetanus. Td may also be given as tetanus wound prophylaxis. Tdap Vaccine - COST NOT COVERED BY MEDICARE PART B Recommended at least once for all adults. For pregnant patients, recommended with each pregnancy. Shingles Vaccine (Shingrix) - COST NOT COVERED BY MEDICARE PART B  2 shot series recommended in those aged 48 and above     Health Maintenance Due:      Topic Date Due   • Cervical Cancer Screening  03/09/2021   • Breast Cancer Screening: Mammogram  09/16/2023   • DXA SCAN  09/16/2027   • Colorectal Cancer Screening  08/11/2033   • Hepatitis C Screening  Completed     Immunizations Due:      Topic Date Due   • COVID-19 Vaccine (1) Never done   • Influenza Vaccine (1) 09/01/2023     Advance Directives   What are advance directives? Advance directives are legal documents that state your wishes and plans for medical care. These plans are made ahead of time in case you lose your ability to make decisions for yourself. Advance directives can apply to any medical decision, such as the treatments you want, and if you want to donate organs. What are the types of advance directives?   There are many types of advance directives, and each state has rules about how to use them. You may choose a combination of any of the following:  · Living will: This is a written record of the treatment you want. You can also choose which treatments you do not want, which to limit, and which to stop at a certain time. This includes surgery, medicine, IV fluid, and tube feedings. · Durable power of  for healthcare [facility]): This is a written record that states who you want to make healthcare choices for you when you are unable to make them for yourself. This person, called a proxy, is usually a family member or a friend. You may choose more than 1 proxy. · Do not resuscitate (DNR) order:  A DNR order is used in case your heart stops beating or you stop breathing. It is a request not to have certain forms of treatment, such as CPR. A DNR order may be included in other types of advance directives. · Medical directive: This covers the care that you want if you are in a coma, near death, or unable to make decisions for yourself. You can list the treatments you want for each condition. Treatment may include pain medicine, surgery, blood transfusions, dialysis, IV or tube feedings, and a ventilator (breathing machine). · Values history: This document has questions about your views, beliefs, and how you feel and think about life. This information can help others choose the care that you would choose. Why are advance directives important? An advance directive helps you control your care. Although spoken wishes may be used, it is better to have your wishes written down. Spoken wishes can be misunderstood, or not followed. Treatments may be given even if you do not want them. An advance directive may make it easier for your family to make difficult choices about your care.    Weight Management   Why it is important to manage your weight:  Being overweight increases your risk of health conditions such as heart disease, high blood pressure, type 2 diabetes, and certain types of cancer. It can also increase your risk for osteoarthritis, sleep apnea, and other respiratory problems. Aim for a slow, steady weight loss. Even a small amount of weight loss can lower your risk of health problems. How to lose weight safely:  A safe and healthy way to lose weight is to eat fewer calories and get regular exercise. You can lose up about 1 pound a week by decreasing the number of calories you eat by 500 calories each day. Healthy meal plan for weight management:  A healthy meal plan includes a variety of foods, contains fewer calories, and helps you stay healthy. A healthy meal plan includes the following:  · Eat whole-grain foods more often. A healthy meal plan should contain fiber. Fiber is the part of grains, fruits, and vegetables that is not broken down by your body. Whole-grain foods are healthy and provide extra fiber in your diet. Some examples of whole-grain foods are whole-wheat breads and pastas, oatmeal, brown rice, and bulgur. · Eat a variety of vegetables every day. Include dark, leafy greens such as spinach, kale, lucille greens, and mustard greens. Eat yellow and orange vegetables such as carrots, sweet potatoes, and winter squash. · Eat a variety of fruits every day. Choose fresh or canned fruit (canned in its own juice or light syrup) instead of juice. Fruit juice has very little or no fiber. · Eat low-fat dairy foods. Drink fat-free (skim) milk or 1% milk. Eat fat-free yogurt and low-fat cottage cheese. Try low-fat cheeses such as mozzarella and other reduced-fat cheeses. · Choose meat and other protein foods that are low in fat. Choose beans or other legumes such as split peas or lentils. Choose fish, skinless poultry (chicken or turkey), or lean cuts of red meat (beef or pork). Before you cook meat or poultry, cut off any visible fat. · Use less fat and oil. Try baking foods instead of frying them.  Add less fat, such as margarine, sour cream, regular salad dressing and mayonnaise to foods. Eat fewer high-fat foods. Some examples of high-fat foods include french fries, doughnuts, ice cream, and cakes. · Eat fewer sweets. Limit foods and drinks that are high in sugar. This includes candy, cookies, regular soda, and sweetened drinks. Exercise:  Exercise at least 30 minutes per day on most days of the week. Some examples of exercise include walking, biking, dancing, and swimming. You can also fit in more physical activity by taking the stairs instead of the elevator or parking farther away from stores. Ask your healthcare provider about the best exercise plan for you. © Copyright [employer] 2018 Information is for End User's use only and may not be sold, redistributed or otherwise used for commercial purposes.  All illustrations and images included in CareNotes® are the copyrighted property of [employer].A[employer] Inc[employer] or [address] [place] [address]

## 2023-09-07 NOTE — ASSESSMENT & PLAN NOTE
BMI Counseling: Body mass index is 25.51 kg/m². The BMI is above normal. Nutrition recommendations include reducing portion sizes, decreasing overall calorie intake and 3-5 servings of fruits/vegetables daily. Exercise recommendations include vigorous aerobic physical activity for 75 minutes/week.

## 2023-09-12 LAB
ALBUMIN SERPL-MCNC: 4.4 G/DL (ref 3.9–4.9)
ALBUMIN/GLOB SERPL: 2.1 {RATIO} (ref 1.2–2.2)
ALP SERPL-CCNC: 77 IU/L (ref 44–121)
ALT SERPL-CCNC: 42 IU/L (ref 0–32)
APPEARANCE UR: CLEAR
AST SERPL-CCNC: 21 IU/L (ref 0–40)
BACTERIA URNS QL MICRO: ABNORMAL
BASOPHILS # BLD AUTO: 0.1 X10E3/UL (ref 0–0.2)
BASOPHILS NFR BLD AUTO: 1 %
BILIRUB SERPL-MCNC: 0.5 MG/DL (ref 0–1.2)
BILIRUB UR QL STRIP: NEGATIVE
BUN SERPL-MCNC: 17 MG/DL (ref 8–27)
BUN/CREAT SERPL: 24 (ref 12–28)
CALCIUM SERPL-MCNC: 9.4 MG/DL (ref 8.7–10.3)
CASTS URNS QL MICRO: ABNORMAL /LPF
CHLORIDE SERPL-SCNC: 103 MMOL/L (ref 96–106)
CHOLEST SERPL-MCNC: 216 MG/DL (ref 100–199)
CHOLEST/HDLC SERPL: 4.4 RATIO (ref 0–4.4)
CO2 SERPL-SCNC: 26 MMOL/L (ref 20–29)
COLOR UR: YELLOW
CREAT SERPL-MCNC: 0.72 MG/DL (ref 0.57–1)
EGFR: 92 ML/MIN/1.73
EOSINOPHIL # BLD AUTO: 0.2 X10E3/UL (ref 0–0.4)
EOSINOPHIL NFR BLD AUTO: 3 %
EPI CELLS #/AREA URNS HPF: ABNORMAL /HPF (ref 0–10)
ERYTHROCYTE [DISTWIDTH] IN BLOOD BY AUTOMATED COUNT: 12.9 % (ref 11.7–15.4)
GLOBULIN SER-MCNC: 2.1 G/DL (ref 1.5–4.5)
GLUCOSE SERPL-MCNC: 93 MG/DL (ref 70–99)
GLUCOSE UR QL: NEGATIVE
HCT VFR BLD AUTO: 40 % (ref 34–46.6)
HDLC SERPL-MCNC: 49 MG/DL
HGB BLD-MCNC: 13.6 G/DL (ref 11.1–15.9)
HGB UR QL STRIP: NEGATIVE
IMM GRANULOCYTES # BLD: 0 X10E3/UL (ref 0–0.1)
IMM GRANULOCYTES NFR BLD: 0 %
KETONES UR QL STRIP: NEGATIVE
LDLC SERPL CALC-MCNC: 122 MG/DL (ref 0–99)
LDLC SERPL DIRECT ASSAY-MCNC: 138 MG/DL (ref 0–99)
LEUKOCYTE ESTERASE UR QL STRIP: ABNORMAL
LYMPHOCYTES # BLD AUTO: 2.5 X10E3/UL (ref 0.7–3.1)
LYMPHOCYTES NFR BLD AUTO: 43 %
MCH RBC QN AUTO: 31 PG (ref 26.6–33)
MCHC RBC AUTO-ENTMCNC: 34 G/DL (ref 31.5–35.7)
MCV RBC AUTO: 91 FL (ref 79–97)
MICRO URNS: ABNORMAL
MONOCYTES # BLD AUTO: 0.4 X10E3/UL (ref 0.1–0.9)
MONOCYTES NFR BLD AUTO: 7 %
NEUTROPHILS # BLD AUTO: 2.7 X10E3/UL (ref 1.4–7)
NEUTROPHILS NFR BLD AUTO: 46 %
NITRITE UR QL STRIP: POSITIVE
PH UR STRIP: 5.5 [PH] (ref 5–7.5)
PLATELET # BLD AUTO: 301 X10E3/UL (ref 150–450)
POTASSIUM SERPL-SCNC: 4.9 MMOL/L (ref 3.5–5.2)
PROT SERPL-MCNC: 6.5 G/DL (ref 6–8.5)
PROT UR QL STRIP: NEGATIVE
RBC # BLD AUTO: 4.39 X10E6/UL (ref 3.77–5.28)
RBC #/AREA URNS HPF: ABNORMAL /HPF (ref 0–2)
SL AMB VLDL CHOLESTEROL CALC: 45 MG/DL (ref 5–40)
SODIUM SERPL-SCNC: 140 MMOL/L (ref 134–144)
SP GR UR: 1.01 (ref 1–1.03)
TRIGL SERPL-MCNC: 255 MG/DL (ref 0–149)
TSH SERPL DL<=0.005 MIU/L-ACNC: 2.02 UIU/ML (ref 0.45–4.5)
UROBILINOGEN UR STRIP-ACNC: 0.2 MG/DL (ref 0.2–1)
WBC # BLD AUTO: 5.8 X10E3/UL (ref 3.4–10.8)
WBC #/AREA URNS HPF: ABNORMAL /HPF (ref 0–5)

## 2023-09-15 DIAGNOSIS — N30.00 ACUTE CYSTITIS WITHOUT HEMATURIA: ICD-10-CM

## 2023-09-15 DIAGNOSIS — R79.89 ELEVATED LFTS: Primary | ICD-10-CM

## 2023-09-15 RX ORDER — CIPROFLOXACIN 250 MG/1
250 TABLET, FILM COATED ORAL EVERY 12 HOURS SCHEDULED
Qty: 14 TABLET | Refills: 0 | Status: SHIPPED | OUTPATIENT
Start: 2023-09-15 | End: 2023-09-22

## 2023-09-24 ENCOUNTER — HOSPITAL ENCOUNTER (OUTPATIENT)
Dept: ULTRASOUND IMAGING | Facility: HOSPITAL | Age: 68
Discharge: HOME | End: 2023-09-24
Payer: MEDICARE

## 2023-09-24 DIAGNOSIS — R79.89 ELEVATED LFTS: ICD-10-CM

## 2023-09-24 PROCEDURE — 76700 US EXAM ABDOM COMPLETE: CPT

## 2023-09-28 ENCOUNTER — TELEPHONE (OUTPATIENT)
Dept: FAMILY MEDICINE CLINIC | Facility: CLINIC | Age: 68
End: 2023-09-28

## 2023-09-28 NOTE — TELEPHONE ENCOUNTER
Patient called, wants to know if you can call her about her ultrasound results.  She said she feels like she may be starting to get another UTI. thanks

## 2023-09-29 ENCOUNTER — TELEPHONE (OUTPATIENT)
Dept: FAMILY MEDICINE CLINIC | Facility: CLINIC | Age: 68
End: 2023-09-29

## 2023-09-29 DIAGNOSIS — N39.0 RECURRENT UTI: Primary | ICD-10-CM

## 2023-09-29 DIAGNOSIS — R93.422 ABNORMAL ULTRASOUND OF LEFT KIDNEY: ICD-10-CM

## 2023-09-29 RX ORDER — SULFAMETHOXAZOLE AND TRIMETHOPRIM 800; 160 MG/1; MG/1
1 TABLET ORAL 2 TIMES DAILY
Qty: 14 TABLET | Refills: 0 | Status: SHIPPED | OUTPATIENT
Start: 2023-09-29 | End: 2023-10-06

## 2023-10-05 ENCOUNTER — HOSPITAL ENCOUNTER (OUTPATIENT)
Dept: CT IMAGING | Facility: HOSPITAL | Age: 68
Discharge: HOME | End: 2023-10-05
Payer: MEDICARE

## 2023-10-05 DIAGNOSIS — N39.0 RECURRENT UTI: ICD-10-CM

## 2023-10-05 DIAGNOSIS — R93.422 ABNORMAL ULTRASOUND OF LEFT KIDNEY: ICD-10-CM

## 2023-10-05 PROCEDURE — 74176 CT ABD & PELVIS W/O CONTRAST: CPT

## 2023-10-05 PROCEDURE — G1004 CDSM NDSC: HCPCS

## 2023-10-13 ENCOUNTER — TELEPHONE (OUTPATIENT)
Dept: FAMILY MEDICINE CLINIC | Facility: CLINIC | Age: 68
End: 2023-10-13

## 2023-10-13 DIAGNOSIS — N39.0 RECURRENT UTI: Primary | ICD-10-CM

## 2023-10-13 DIAGNOSIS — R93.429 ABNORMAL CT SCAN, KIDNEY: ICD-10-CM

## 2023-10-13 NOTE — TELEPHONE ENCOUNTER
Patient called, her CT results came in and she wanted to know if you could call her and explain to her what it says.

## 2023-11-09 DIAGNOSIS — F41.9 ANXIETY: ICD-10-CM

## 2023-11-10 RX ORDER — LORAZEPAM 0.5 MG/1
0.5 TABLET ORAL DAILY PRN
Qty: 30 TABLET | Refills: 0 | Status: SHIPPED | OUTPATIENT
Start: 2023-11-10

## 2023-12-22 DIAGNOSIS — F41.9 ANXIETY: ICD-10-CM

## 2023-12-22 RX ORDER — LORAZEPAM 0.5 MG/1
0.5 TABLET ORAL DAILY PRN
Qty: 30 TABLET | Refills: 0 | Status: SHIPPED | OUTPATIENT
Start: 2023-12-22

## 2024-02-14 DIAGNOSIS — I10 BENIGN ESSENTIAL HYPERTENSION: ICD-10-CM

## 2024-02-14 DIAGNOSIS — I10 ESSENTIAL HYPERTENSION: ICD-10-CM

## 2024-02-14 RX ORDER — ATENOLOL 25 MG/1
25 TABLET ORAL DAILY
Qty: 90 TABLET | Refills: 1 | Status: SHIPPED | OUTPATIENT
Start: 2024-02-14

## 2024-02-19 DIAGNOSIS — F41.9 ANXIETY: ICD-10-CM

## 2024-02-20 RX ORDER — LORAZEPAM 0.5 MG/1
0.5 TABLET ORAL DAILY PRN
Qty: 30 TABLET | Refills: 0 | Status: SHIPPED | OUTPATIENT
Start: 2024-02-20

## 2024-03-12 DIAGNOSIS — N39.0 RECURRENT UTI: Primary | ICD-10-CM

## 2024-03-12 RX ORDER — SULFAMETHOXAZOLE AND TRIMETHOPRIM 800; 160 MG/1; MG/1
1 TABLET ORAL EVERY 12 HOURS SCHEDULED
Qty: 10 TABLET | Refills: 0 | Status: SHIPPED | OUTPATIENT
Start: 2024-03-12 | End: 2024-03-17

## 2024-03-18 ENCOUNTER — HOSPITAL ENCOUNTER (OUTPATIENT)
Dept: MAMMOGRAPHY | Facility: CLINIC | Age: 69
Discharge: HOME | End: 2024-03-18
Payer: MEDICARE

## 2024-03-18 ENCOUNTER — OFFICE VISIT (OUTPATIENT)
Dept: URGENT CARE | Facility: CLINIC | Age: 69
End: 2024-03-18
Payer: MEDICARE

## 2024-03-18 VITALS
DIASTOLIC BLOOD PRESSURE: 80 MMHG | HEART RATE: 66 BPM | RESPIRATION RATE: 16 BRPM | SYSTOLIC BLOOD PRESSURE: 126 MMHG | TEMPERATURE: 96.3 F | BODY MASS INDEX: 25.66 KG/M2 | OXYGEN SATURATION: 99 % | WEIGHT: 154 LBS | HEIGHT: 65 IN

## 2024-03-18 VITALS — HEIGHT: 65 IN | WEIGHT: 152 LBS | BODY MASS INDEX: 25.33 KG/M2

## 2024-03-18 DIAGNOSIS — J01.10 ACUTE NON-RECURRENT FRONTAL SINUSITIS: Primary | ICD-10-CM

## 2024-03-18 DIAGNOSIS — Z12.31 SCREENING MAMMOGRAM, ENCOUNTER FOR: ICD-10-CM

## 2024-03-18 PROCEDURE — 99213 OFFICE O/P EST LOW 20 MIN: CPT | Performed by: PHYSICIAN ASSISTANT

## 2024-03-18 PROCEDURE — 77067 SCR MAMMO BI INCL CAD: CPT

## 2024-03-18 PROCEDURE — 77063 BREAST TOMOSYNTHESIS BI: CPT

## 2024-03-18 PROCEDURE — G0463 HOSPITAL OUTPT CLINIC VISIT: HCPCS | Performed by: PHYSICIAN ASSISTANT

## 2024-03-18 RX ORDER — AZITHROMYCIN 250 MG/1
TABLET, FILM COATED ORAL
COMMUNITY
Start: 2024-02-22 | End: 2024-03-20

## 2024-03-18 RX ORDER — AMOXICILLIN AND CLAVULANATE POTASSIUM 875; 125 MG/1; MG/1
1 TABLET, FILM COATED ORAL EVERY 12 HOURS SCHEDULED
Qty: 14 TABLET | Refills: 0 | Status: SHIPPED | OUTPATIENT
Start: 2024-03-18 | End: 2024-03-25

## 2024-03-18 NOTE — PROGRESS NOTES
[facility]        NAME: Melba Busby is a 68 y.o. female  : 1955    MRN: [id number]  DATE: 2024  TIME: 1:09 PM    Assessment and Plan   Acute non-recurrent frontal sinusitis [J01.10]  1. Acute non-recurrent frontal sinusitis  amoxicillin-clavulanate (AUGMENTIN) 875-125 mg per tablet            Patient Instructions     Take antibiotics as directed    Start Flonase or Nasonex nasal spray    Follow up with PCP in 3-5 days.  Proceed to  ER if symptoms worsen.    If tests have been performed at [facility], our office will contact you with results if changes need to be made to the care plan discussed with you at the visit.  [facility]    Chief Complaint     Chief Complaint   Patient presents with    Cold Like Symptoms     Pt reports nasal congestion, sinus pressure/pain x3 weeks. Reports being seen at [facility] urgent care and given Zpack 3 weeks ago with some improvement. Was on an airplane last week and feels symptoms are worse in the past week. Taking allegra-d and nyquil. Denies fevers.          History of Present Illness       Sinusitis  This is a new problem. Episode onset: 3 weeks. The problem has been waxing and waning since onset. There has been no fever. Associated symptoms include congestion, coughing, ear pain, sinus pressure and a sore throat. Pertinent negatives include no chills, headaches or shortness of breath. Past treatments include antibiotics and saline nose sprays (Aleve D, NyQuil). The treatment provided moderate relief.   Patient reports she was treated with a Z-aaron 3 weeks ago which provided partial relief of her symptoms.  She was then on vacation about 1 week ago and upon returned noted a significant worsening in her nasal congestion and pressure with associated cough, mild laryngitis, ear pressure.  She c/o pressure behind her eyes but denies dental pain.    She reports this is her 3rd sinus infection this winter she  believes.     Review of Systems   Review of Systems   Constitutional:  Negative for chills and fever.   HENT:  Positive for congestion, ear pain, sinus pressure and sore throat. Negative for dental problem.    Eyes:  Negative for pain and visual disturbance.   Respiratory:  Positive for cough. Negative for shortness of breath.    Cardiovascular:  Negative for chest pain and palpitations.   Gastrointestinal:  Negative for abdominal pain and vomiting.   Genitourinary:  Negative for dysuria and hematuria.   Musculoskeletal:  Negative for arthralgias and back pain.   Skin:  Negative for color change and rash.   Neurological:  Negative for seizures, syncope and headaches.   All other systems reviewed and are negative.        Current Medications       Current Outpatient Medications:     amoxicillin-clavulanate (AUGMENTIN) 875-125 mg per tablet, Take 1 tablet by mouth every 12 (twelve) hours for 7 days, Disp: 14 tablet, Rfl: 0    [place] Thyroid 15 MG tablet, TAKE ONE TABLET BY MOUTH EVERY DAY BEFORE BREAKFAST, Disp: , Rfl:     [place] Thyroid 60 MG tablet, Take 60 mg by mouth daily , Disp: , Rfl:     atenolol (TENORMIN) 25 mg tablet, TAKE 1 TABLET (25 MG TOTAL) BY MOUTH DAILY AT 8 PM, Disp: 90 tablet, Rfl: 1    LORazepam (ATIVAN) 0.5 mg tablet, Take 1 tablet (0.5 mg total) by mouth daily as needed for anxiety, Disp: 30 tablet, Rfl: 0    azithromycin (ZITHROMAX) 250 mg tablet, TAKE 2 TABLETS BY MOUTH TODAY, THEN TAKE 1 TABLET DAILY FOR 4 DAYS AS DIRECTED (Patient not taking: Reported on 3/18/2024), Disp: , Rfl:     pantoprazole (PROTONIX) 40 mg tablet, TAKE 1 TABLET BY MOUTH DAILY BEFORE BREAKFAST (Patient not taking: Reported on 3/18/2024), Disp: 90 tablet, Rfl: 0    Current Allergies     Allergies as of 03/18/2024    (No Known Allergies)            The following portions of the patient's history were reviewed and updated as appropriate: allergies, current medications, past family history, past medical history, past social  "history, past surgical history and problem list.     Past Medical History:   Diagnosis Date    Anxiety     Arthritis     Disease of thyroid gland     Hypertension        Past Surgical History:   Procedure Laterality Date    BREAST CYST EXCISION Left 2012    Benign    BREAST LUMPECTOMY      THYROID SURGERY      Total Thyroidectomy       Family History   Problem Relation Age of Onset    Arthritis Mother     Lung cancer Mother 69    Lung cancer Father 69    No Known Problems Daughter     No Known Problems Daughter     Diabetes Maternal Grandmother         Mellitus    No Known Problems Maternal Grandfather     Diabetes Paternal Grandmother         Mellitus    No Known Problems Paternal Grandfather     Lung cancer Brother 70    COPD Brother     Cancer Brother 70        Unknown origin    No Known Problems Paternal Aunt     No Known Problems Paternal Aunt     No Known Problems Paternal Aunt          Medications have been verified.        Objective   /80   Pulse 66   Temp (!) 96.3 °F (35.7 °C)   Resp 16   Ht 5' 5\" (1.651 m)   Wt 69.9 kg (154 lb)   LMP  (LMP Unknown)   SpO2 99%   BMI 25.63 kg/m²   No LMP recorded (lmp unknown). Patient is postmenopausal.       Physical Exam     Physical Exam  Vitals and nursing note reviewed.   Constitutional:       General: She is not in acute distress.     Appearance: Normal appearance.   HENT:      Head: Normocephalic and atraumatic.      Right Ear: Tympanic membrane and ear canal normal.      Left Ear: Tympanic membrane and ear canal normal.      Nose:      Comments: Bilat turbinate erythema and edema  Frontal sinus tenderness     Mouth/Throat:      Mouth: Mucous membranes are moist.      Pharynx: No posterior oropharyngeal erythema.   Eyes:      Conjunctiva/sclera: Conjunctivae normal.   Cardiovascular:      Rate and Rhythm: Normal rate and regular rhythm.      Pulses: Normal pulses.      Heart sounds: Normal heart sounds.   Pulmonary:      Effort: Pulmonary effort is " normal.      Breath sounds: Normal breath sounds.   Lymphadenopathy:      Cervical: No cervical adenopathy.   Skin:     General: Skin is warm and dry.   Neurological:      Mental Status: She is alert and oriented to person, place, and time.   Psychiatric:         Mood and Affect: Mood normal.         Behavior: Behavior normal.

## 2024-03-18 NOTE — PATIENT INSTRUCTIONS
Take antibiotics as directed    Start Flonase or Nasonex nasal spray    Follow up with PCP in 3-5 days.  Proceed to  ER if symptoms worsen.    If tests have been performed at [facility], our office will contact you with results if changes need to be made to the care plan discussed with you at the visit.  You can review your full results on [facility].

## 2024-03-20 ENCOUNTER — OFFICE VISIT (OUTPATIENT)
Age: 69
End: 2024-03-20
Payer: MEDICARE

## 2024-03-20 VITALS
SYSTOLIC BLOOD PRESSURE: 112 MMHG | HEIGHT: 65 IN | BODY MASS INDEX: 25.66 KG/M2 | DIASTOLIC BLOOD PRESSURE: 82 MMHG | WEIGHT: 154 LBS

## 2024-03-20 DIAGNOSIS — Z11.59 SCREENING FOR VIRAL DISEASE: ICD-10-CM

## 2024-03-20 DIAGNOSIS — R79.89 ABNORMAL LFTS: ICD-10-CM

## 2024-03-20 DIAGNOSIS — K76.0 FATTY LIVER: Primary | ICD-10-CM

## 2024-03-20 DIAGNOSIS — K80.20 GALLSTONES: ICD-10-CM

## 2024-03-20 DIAGNOSIS — Z12.11 COLON CANCER SCREENING: ICD-10-CM

## 2024-03-20 PROCEDURE — 99215 OFFICE O/P EST HI 40 MIN: CPT | Performed by: INTERNAL MEDICINE

## 2024-03-20 NOTE — PROGRESS NOTES
[facility] - Outpatient Consultation  Melba Busby 68 y.o. female MRN: [id number]  Encounter: [id number]    ASSESSMENT AND PLAN:      1. Fatty liver  --Patient with fatty liver noted on imaging and mild elevation of her ALT on serial examinations.  No stigmata of significant liver disease on physical exam.  -Patient's BMI is near normal.  She does have 1 or 2 glasses of wine per night    -Do not suspect advanced liver disease.  Recheck LFTs fib 4 and elasticity  -Does have some chronic fatigue but this may be unrelated to this issue  - Comprehensive metabolic panel; Future  - FIB-4 w/Reflex HENRY FibroSure; Future  - US elastography; Future    2. Colon cancer screening  --Up-to-date with colon cancer screening.  Negative colonoscopy August 2023    3. Abnormal LFTs  --Evaluate for alternative reasons for mild elevation of LFTs.  - Iron Panel (Includes Ferritin, Iron Sat%, Iron, and TIBC); Future  - Anti-smooth muscle antibody, IgG; Future    4. Gallstones  -Incidental finding.  Patient counseled about biliary tract symptoms.  Post prandial nausea and epigastric right upper quadrant pain or symptoms.  Can be sporadic or recurrent.  Patient without any of the symptoms at present    5. Screening for viral disease  --As above  - Chronic Hepatitis Panel; Future      Followup Appointment:  6 mo   ______________________________________________________________________    Chief Complaint   Patient presents with    fatty liver     Patient is referred by his PCP Dr. Tesfaye for evaluation of elevated lft's and fatty liver.     HPI:   Melba Busby is a 68 y.o. year old female who evaluation of abnormal liver function studies and fatty liver.  Over the last several years patient has had some minor transaminase elevation.  Specifically, over the last 2 years her ALT has been 5 to 20 units higher than the normal range.  Abdominal examination the ultrasound was revealing for echogenic  liver and gallstones.  Patient had a normal-appearing spleen.  CAT scan examination done in the course of urologic workup indicated diffuse fatty liver.  This was a noncontrast study.  Patient has no previous history of hepatitis.  She drinks 1 or 2 glasses of wine on a nightly basis.  She has been doing this for the last 12 years or so.  She has complained a little bit of weight over the years.  She is nondiabetic and on review of labs has mild hyper triglyceridemia.  Patient has no symptoms of abdominal pain.  There is no family history of liver disease.  Patient does report some issues with chronic fatigue.  We have seen the patient for screening colonoscopy which she had last August which was negative for any adenomatous polyps and positive for mild diverticulosis    Historical Information   Past Medical History:   Diagnosis Date    Anxiety     Arthritis     Disease of thyroid gland     Fatty liver     Hypertension      Past Surgical History:   Procedure Laterality Date    BREAST CYST EXCISION Left     Benign    BREAST LUMPECTOMY      COLONOSCOPY  2023    THYROID SURGERY      Total Thyroidectomy     Social History     Substance and Sexual Activity   Alcohol Use Yes    Alcohol/week: 14.0 standard drinks of alcohol    Types: 14 Glasses of wine per week    Comment: Social     Social History     Substance and Sexual Activity   Drug Use No     Social History     Tobacco Use   Smoking Status Former    Current packs/day: 0.00    Average packs/day: 1 pack/day for 30.0 years (30.0 ttl pk-yrs)    Types: Cigarettes    Start date: 1967    Quit date: 1997    Years since quittin.2   Smokeless Tobacco Never     Family History   Problem Relation Age of Onset    Arthritis Mother     Lung cancer Mother 69    Lung cancer Father 69    No Known Problems Daughter     No Known Problems Daughter     Diabetes Maternal Grandmother         Mellitus    No Known Problems Maternal Grandfather     Diabetes Paternal  "Grandmother         Mellitus    No Known Problems Paternal Grandfather     Lung cancer Brother 70    COPD Brother     Cancer Brother 70        Unknown origin    No Known Problems Paternal Aunt     No Known Problems Paternal Aunt     No Known Problems Paternal Aunt        Meds/Allergies     Current Outpatient Medications:     amoxicillin-clavulanate (AUGMENTIN) 875-125 mg per tablet    [place] Thyroid 15 MG tablet    [place] Thyroid 60 MG tablet    atenolol (TENORMIN) 25 mg tablet    LORazepam (ATIVAN) 0.5 mg tablet    No Known Allergies    PHYSICAL EXAM:    Blood pressure 112/82, height 5' 5\" (1.651 m), weight 69.9 kg (154 lb), not currently breastfeeding. Body mass index is 25.63 kg/m².  General Appearance: NAD, cooperative, alert  Eyes: Anicteric, conjunctiva pink   ENT:  Normocephalic, atraumatic, normal mucosa.    Neck:  Supple, symmetrical, trachea midline,   Resp:  Clear to auscultation bilaterally; no rales, rhonchi or wheezing; respirations unlabored   CV:  S1 S2, Regular rate and rhythm; no murmur, rub, or gallop.  GI:  Soft, non-tender, non-distended; normal bowel sounds; no masses, no organomegaly   Rectal: Deferred  Musculoskeletal: No cyanosis, clubbing or edema. Normal ROM.  Skin:  No jaundice, rashes, or lesions-no spider angiomata or palmar erythema  Heme/Lymph: No palpable cervical lymphadenopathy  Psych: Normal affect, good eye contact  Neuro: No gross deficits, AAOx3    Lab Results:   Lab Results   Component Value Date    WBC 5.8 09/11/2023    HGB 13.6 09/11/2023    HCT 40.0 09/11/2023    MCV 91 09/11/2023     09/11/2023     Lab Results   Component Value Date     01/03/2017    K 4.9 09/11/2023     09/11/2023    CO2 26 09/11/2023    BUN 17 09/11/2023    CREATININE 0.72 09/11/2023    GLUF 97 04/19/2017    CALCIUM 9.7 05/09/2018    AST 21 09/11/2023    ALT 42 (H) 09/11/2023    ALKPHOS 76 05/09/2018    PROT 6.7 01/03/2017    BILITOT 0.5 01/03/2017    EGFR 92 09/11/2023 "             CONSTITUTIONAL: Denies any fever, chills, rigors, and weight loss, Positive for fatigue .  HEENT: No earache or tinnitus. Denies hearing loss or visual disturbances.  CARDIOVASCULAR: No chest pain or palpitations.   RESPIRATORY: Denies any cough, hemoptysis, shortness of breath or dyspnea on exertion.  GASTROINTESTINAL: As noted in the History of Present Illness.   GENITOURINARY: No problems with urination. Denies any hematuria or dysuria.  NEUROLOGIC: No dizziness or vertigo, denies headaches.   MUSCULOSKELETAL: Positive for low back pain and some musculoskeletal pain.   SKIN: Denies skin rashes or itching.   ENDOCRINE: Denies excessive thirst. Denies intolerance to heat or cold.  PSYCHOSOCIAL: Denies depression or anxiety. Denies any recent memory loss.  Some sleeping difficulties

## 2024-03-20 NOTE — PATIENT INSTRUCTIONS
[facility] - Outpatient Consultation  Melba Busby 68 y.o. female MRN: [id number]  Encounter: [id number]    ASSESSMENT AND PLAN:      1. Fatty liver  --Patient with fatty liver noted on imaging and mild elevation of her ALT on serial examinations.  No stigmata of significant liver disease on physical exam.  -Patient's BMI is near normal.  She does have 1 or 2 glasses of wine per night    -Do not suspect advanced liver disease.  Recheck LFTs fib 4 and elasticity  -Does have some chronic fatigue but this may be unrelated to this issue  - Comprehensive metabolic panel; Future  - FIB-4 w/Reflex [place]EN[place] FibroSure; Future  - US elastography; Future    2. Colon cancer screening  --Up-to-date with colon cancer screening.  Negative colonoscopy August 2023    3. Abnormal LFTs  --Evaluate for alternative reasons for mild elevation of LFTs.  - Iron Panel (Includes Ferritin, Iron Sat%, Iron, and TIBC); Future  - Anti-smooth muscle antibody, IgG; Future    4. Gallstones  -Incidental finding.  Patient counseled about biliary tract symptoms.  Post prandial nausea and epigastric right upper quadrant pain or symptoms.  Can be sporadic or recurrent.  Patient without any of the symptoms at present    5. Screening for viral disease  --As above  - Chronic Hepatitis Panel; Future      Followup Appointment:  6 mo

## 2024-03-21 PROBLEM — K80.20 GALLSTONES: Status: ACTIVE | Noted: 2024-03-21

## 2024-03-21 PROBLEM — K76.0 FATTY LIVER: Status: ACTIVE | Noted: 2024-03-21

## 2024-03-25 ENCOUNTER — TELEPHONE (OUTPATIENT)
Age: 69
End: 2024-03-25

## 2024-03-25 NOTE — TELEPHONE ENCOUNTER
Patient canceled appointment due to being unsure of whether this year may be covered by medicare or not. Called patient back to relay that medicare may only cover every 2 years, left msg. Notified patient that yearly covered with a guarantee by insurances, that next yearly is due 01/31/25 or after.

## 2024-04-08 ENCOUNTER — LAB (OUTPATIENT)
Dept: LAB | Facility: HOSPITAL | Age: 69
End: 2024-04-08
Payer: MEDICARE

## 2024-04-08 DIAGNOSIS — K76.0 FATTY LIVER: ICD-10-CM

## 2024-04-08 DIAGNOSIS — Z11.59 SCREENING FOR VIRAL DISEASE: ICD-10-CM

## 2024-04-08 DIAGNOSIS — R79.89 ABNORMAL LFTS: ICD-10-CM

## 2024-04-08 LAB
ALBUMIN SERPL BCP-MCNC: 4.4 G/DL (ref 3.5–5)
ALP SERPL-CCNC: 68 U/L (ref 34–104)
ALT SERPL W P-5'-P-CCNC: 46 U/L (ref 7–52)
ANION GAP SERPL CALCULATED.3IONS-SCNC: 8 MMOL/L (ref 4–13)
AST SERPL W P-5'-P-CCNC: 26 U/L (ref 13–39)
BILIRUB SERPL-MCNC: 0.63 MG/DL (ref 0.2–1)
BUN SERPL-MCNC: 16 MG/DL (ref 5–25)
CALCIUM SERPL-MCNC: 9.6 MG/DL (ref 8.4–10.2)
CHLORIDE SERPL-SCNC: 103 MMOL/L (ref 96–108)
CO2 SERPL-SCNC: 28 MMOL/L (ref 21–32)
CREAT SERPL-MCNC: 0.74 MG/DL (ref 0.6–1.3)
FERRITIN SERPL-MCNC: 83 NG/ML (ref 11–307)
GFR SERPL CREATININE-BSD FRML MDRD: 83 ML/MIN/1.73SQ M
GLUCOSE P FAST SERPL-MCNC: 98 MG/DL (ref 65–99)
HBV CORE AB SER QL: NORMAL
HBV CORE IGM SER QL: NORMAL
HBV SURFACE AG SER QL: NORMAL
HCV AB SER QL: NORMAL
IRON SATN MFR SERPL: 28 % (ref 15–50)
IRON SERPL-MCNC: 96 UG/DL (ref 50–212)
POTASSIUM SERPL-SCNC: 4.4 MMOL/L (ref 3.5–5.3)
PROT SERPL-MCNC: 7.1 G/DL (ref 6.4–8.4)
SODIUM SERPL-SCNC: 139 MMOL/L (ref 135–147)
TIBC SERPL-MCNC: 347 UG/DL (ref 250–450)
UIBC SERPL-MCNC: 251 UG/DL (ref 155–355)

## 2024-04-08 PROCEDURE — 86705 HEP B CORE ANTIBODY IGM: CPT

## 2024-04-08 PROCEDURE — 86015 ACTIN ANTIBODY EACH: CPT

## 2024-04-08 PROCEDURE — 86704 HEP B CORE ANTIBODY TOTAL: CPT

## 2024-04-08 PROCEDURE — 87340 HEPATITIS B SURFACE AG IA: CPT

## 2024-04-08 PROCEDURE — 82728 ASSAY OF FERRITIN: CPT

## 2024-04-08 PROCEDURE — 83540 ASSAY OF IRON: CPT

## 2024-04-08 PROCEDURE — 80053 COMPREHEN METABOLIC PANEL: CPT

## 2024-04-08 PROCEDURE — 36415 COLL VENOUS BLD VENIPUNCTURE: CPT

## 2024-04-08 PROCEDURE — 86803 HEPATITIS C AB TEST: CPT

## 2024-04-08 PROCEDURE — 83550 IRON BINDING TEST: CPT

## 2024-04-09 LAB — ACTIN IGG SERPL-ACNC: 4 UNITS (ref 0–19)

## 2024-04-15 DIAGNOSIS — F41.9 ANXIETY: ICD-10-CM

## 2024-04-15 RX ORDER — LORAZEPAM 0.5 MG/1
0.5 TABLET ORAL DAILY PRN
Qty: 30 TABLET | Refills: 0 | Status: SHIPPED | OUTPATIENT
Start: 2024-04-15

## 2024-04-26 ENCOUNTER — HOSPITAL ENCOUNTER (OUTPATIENT)
Dept: ULTRASOUND IMAGING | Facility: CLINIC | Age: 69
Discharge: HOME | End: 2024-04-26
Payer: MEDICARE

## 2024-04-26 ENCOUNTER — HOSPITAL ENCOUNTER (OUTPATIENT)
Dept: MAMMOGRAPHY | Facility: CLINIC | Age: 69
Discharge: HOME | End: 2024-04-26
Payer: MEDICARE

## 2024-04-26 VITALS — WEIGHT: 154 LBS | HEIGHT: 65 IN | BODY MASS INDEX: 25.66 KG/M2

## 2024-04-26 DIAGNOSIS — R92.8 ABNORMAL SCREENING MAMMOGRAM: ICD-10-CM

## 2024-04-26 PROCEDURE — 77065 DX MAMMO INCL CAD UNI: CPT

## 2024-04-26 PROCEDURE — 76642 ULTRASOUND BREAST LIMITED: CPT

## 2024-04-26 PROCEDURE — G0279 TOMOSYNTHESIS, MAMMO: HCPCS

## 2024-04-29 LAB — MISCELLANEOUS LAB TEST RESULT: NORMAL

## 2024-05-06 ENCOUNTER — RA CDI HCC (OUTPATIENT)
Dept: OTHER | Facility: HOSPITAL | Age: 69
End: 2024-05-06

## 2024-05-14 NOTE — DISCHARGE INSTRUCTIONS
ACTIVITY  · Please do activities that will bring on the normal pain that we are rating  For example, if vacuuming or walking increases the pain, do that  This will give the most accurate response to the diary  · You may shower, but no tub baths today, or applied heat  CARE OF THE INJECTION SITE  · This area may be numb for several hours after the injection  · Notify [facility] if you have any of the following:  redness, drainage, swelling, or fever above 100°F     SPECIAL INSTRUCTIONS  · Please return the MBB diary to our office by mail, fax, or drop it off  MEDICATIONS  · Please do not take any break through or short acting pain medications for 8 hours after the block  · Continue to take all routine medications  · Our office may have instructed you to hold some medications  As no general anesthesia was used in today's procedure, you should not experience any side effects related to anesthesia  If you have a problem specifically related to your procedure, please call our office at [phone]  Problems not related to your procedure should be directed to your primary care physician  no

## 2024-05-20 ENCOUNTER — OFFICE VISIT (OUTPATIENT)
Dept: FAMILY MEDICINE CLINIC | Facility: CLINIC | Age: 69
End: 2024-05-20
Payer: MEDICARE

## 2024-05-20 VITALS
OXYGEN SATURATION: 98 % | WEIGHT: 155.8 LBS | TEMPERATURE: 97.3 F | HEIGHT: 65 IN | BODY MASS INDEX: 25.96 KG/M2 | RESPIRATION RATE: 15 BRPM | HEART RATE: 67 BPM | DIASTOLIC BLOOD PRESSURE: 94 MMHG | SYSTOLIC BLOOD PRESSURE: 130 MMHG

## 2024-05-20 DIAGNOSIS — J32.9 CHRONIC SINUSITIS, UNSPECIFIED LOCATION: ICD-10-CM

## 2024-05-20 DIAGNOSIS — F41.9 ANXIETY: ICD-10-CM

## 2024-05-20 DIAGNOSIS — G47.19 EXCESSIVE DAYTIME SLEEPINESS: ICD-10-CM

## 2024-05-20 DIAGNOSIS — Z79.899 CONTROLLED SUBSTANCE AGREEMENT SIGNED: ICD-10-CM

## 2024-05-20 DIAGNOSIS — E89.0 POSTPROCEDURAL HYPOTHYROIDISM: Primary | ICD-10-CM

## 2024-05-20 DIAGNOSIS — R06.83 SNORING: ICD-10-CM

## 2024-05-20 DIAGNOSIS — I10 BENIGN ESSENTIAL HYPERTENSION: ICD-10-CM

## 2024-05-20 PROCEDURE — 99214 OFFICE O/P EST MOD 30 MIN: CPT | Performed by: FAMILY MEDICINE

## 2024-05-20 PROCEDURE — G2211 COMPLEX E/M VISIT ADD ON: HCPCS | Performed by: FAMILY MEDICINE

## 2024-05-20 NOTE — PROGRESS NOTES
Melba Busby 1955 female MRN: [id number]    Family Medicine Follow-up Visit    ASSESSMENT/PLAN  Problem List Items Addressed This Visit          Cardiovascular and Mediastinum    Benign essential hypertension     Well controlled on current dose atenolol 25 mg daily continue as prescribed             Endocrine    Postprocedural hypothyroidism - Primary     Follows with endocrine in [place]  Continue to follow with them as scheduled             Behavioral Health    Anxiety     Did not tolerate SSRI therapy  Only uses ativan prn            Other    Controlled substance agreement signed     Agreement signed in office today  PDMP reviewed today and refill is appropriate           Other Visit Diagnoses       Snoring        Relevant Orders    Ambulatory Referral to Sleep Medicine    Excessive daytime sleepiness        Relevant Orders    Ambulatory Referral to Sleep Medicine    Chronic sinusitis, unspecified location        Relevant Orders    Ambulatory Referral to Otolaryngology            Follow up for AWV as scheduled          Future Appointments   Date Time Provider Department Center   9/3/2024  1:00 PM Ravi Mcadams MD [facility]          SUBJECTIVE  CC: Follow-up (Check up medications--Lorazepam)      HPI:  Melba Busby is a 68 y.o. female who presents for check up.      HPI    Review of Systems   Constitutional:  Negative for chills, fatigue and fever.   HENT:  Negative for congestion, postnasal drip, rhinorrhea and sinus pressure.    Eyes:  Negative for photophobia and visual disturbance.   Respiratory:  Negative for cough and shortness of breath.    Cardiovascular:  Negative for chest pain, palpitations and leg swelling.   Gastrointestinal:  Negative for abdominal pain, constipation, diarrhea, nausea and vomiting.   Genitourinary:  Negative for difficulty urinating and dysuria.   Musculoskeletal:  Negative for arthralgias and myalgias.   Skin:  Negative for color change and rash.   Neurological:   Negative for dizziness, weakness, light-headedness and headaches.       Historical Information   The patient history was reviewed as follows:    Past Medical History:   Diagnosis Date    Anxiety     Arthritis     Disease of thyroid gland     Fatty liver     Hypertension      Past Surgical History:   Procedure Laterality Date    BREAST CYST EXCISION Left     Benign    BREAST LUMPECTOMY      COLONOSCOPY  2023    THYROID SURGERY      Total Thyroidectomy     Family History   Problem Relation Age of Onset    Arthritis Mother     Lung cancer Mother 69    Lung cancer Father 69    No Known Problems Daughter     No Known Problems Daughter     Diabetes Maternal Grandmother         Mellitus    No Known Problems Maternal Grandfather     Diabetes Paternal Grandmother         Mellitus    No Known Problems Paternal Grandfather     Lung cancer Brother 70    COPD Brother     Cancer Brother 70        Unknown origin    No Known Problems Paternal Aunt     No Known Problems Paternal Aunt     No Known Problems Paternal Aunt     Breast cancer Neg Hx     Colon cancer Neg Hx     Endometrial cancer Neg Hx     Ovarian cancer Neg Hx       Social History   Social History     Substance and Sexual Activity   Alcohol Use Yes    Alcohol/week: 14.0 standard drinks of alcohol    Types: 14 Glasses of wine per week    Comment: Social     Social History     Substance and Sexual Activity   Drug Use No     Social History     Tobacco Use   Smoking Status Former    Current packs/day: 0.00    Average packs/day: 1 pack/day for 30.0 years (30.0 ttl pk-yrs)    Types: Cigarettes    Start date: 1967    Quit date: 1997    Years since quittin.4   Smokeless Tobacco Never       Medications:     Current Outpatient Medications:     [place] Thyroid 15 MG tablet, TAKE ONE TABLET BY MOUTH EVERY DAY BEFORE BREAKFAST, Disp: , Rfl:     [place] Thyroid 60 MG tablet, Take 60 mg by mouth daily , Disp: , Rfl:     atenolol (TENORMIN) 25 mg tablet, TAKE 1  "TABLET (25 MG TOTAL) BY MOUTH DAILY AT 8 PM, Disp: 90 tablet, Rfl: 1    LORazepam (ATIVAN) 0.5 mg tablet, Take 1 tablet (0.5 mg total) by mouth daily as needed for anxiety, Disp: 30 tablet, Rfl: 0  No Known Allergies    OBJECTIVE    Vitals:   Vitals:    05/20/24 1129   BP: 130/94   BP Location: Left arm   Patient Position: Sitting   Cuff Size: Large   Pulse: 67   Resp: 15   Temp: (!) 97.3 °F (36.3 °C)   TempSrc: Tympanic   SpO2: 98%   Weight: 70.7 kg (155 lb 12.8 oz)   Height: 5' 5\" (1.651 m)           Physical Exam  Constitutional:       Appearance: She is well-developed.   HENT:      Head: Normocephalic and atraumatic.      Right Ear: A middle ear effusion is present.      Left Ear: A middle ear effusion is present.      Nose: Congestion and rhinorrhea present.   Cardiovascular:      Rate and Rhythm: Normal rate and regular rhythm.      Heart sounds: Normal heart sounds.   Pulmonary:      Effort: Pulmonary effort is normal. No respiratory distress.      Breath sounds: Normal breath sounds. No wheezing.   Musculoskeletal:         General: No tenderness. Normal range of motion.      Cervical back: Normal range of motion and neck supple.   Skin:     General: Skin is warm and dry.   Neurological:      Mental Status: She is alert and oriented to person, place, and time.   Psychiatric:         Mood and Affect: Mood normal.         Behavior: Behavior normal.            Labs:        [place] Tesfaye DO    5/20/2024      " 17

## 2024-06-23 DIAGNOSIS — F41.9 ANXIETY: ICD-10-CM

## 2024-06-23 NOTE — TELEPHONE ENCOUNTER
Medication Refill Request     Name LORazepam (ATIVAN) 0.5 mg tablet   Dose/Frequency 0.5 mg   Quantity 30 tablet  Verified pharmacy   [x]  Verified ordering Provider   [x]  Does patient have enough for the next 3 days? Yes [x] No []

## 2024-06-24 RX ORDER — LORAZEPAM 0.5 MG/1
0.5 TABLET ORAL DAILY PRN
Qty: 30 TABLET | Refills: 0 | Status: SHIPPED | OUTPATIENT
Start: 2024-06-24

## 2024-08-09 ENCOUNTER — TELEPHONE (OUTPATIENT)
Age: 69
End: 2024-08-09

## 2024-08-09 DIAGNOSIS — K21.9 GASTROESOPHAGEAL REFLUX DISEASE, UNSPECIFIED WHETHER ESOPHAGITIS PRESENT: Primary | ICD-10-CM

## 2024-08-09 DIAGNOSIS — I10 BENIGN ESSENTIAL HYPERTENSION: ICD-10-CM

## 2024-08-09 DIAGNOSIS — I10 ESSENTIAL HYPERTENSION: ICD-10-CM

## 2024-08-09 RX ORDER — ATENOLOL 25 MG/1
25 TABLET ORAL DAILY
Qty: 90 TABLET | Refills: 1 | Status: SHIPPED | OUTPATIENT
Start: 2024-08-09

## 2024-08-09 RX ORDER — PANTOPRAZOLE SODIUM 40 MG/1
40 TABLET, DELAYED RELEASE ORAL
Qty: 30 TABLET | Refills: 5 | Status: SHIPPED | OUTPATIENT
Start: 2024-08-09 | End: 2025-02-05

## 2024-08-09 NOTE — TELEPHONE ENCOUNTER
Not on active medication list! - routing to office for review    Reason for call:   [x] Refill   [] Prior Auth  [] Other:     Office:   [x] PCP/Provider - Dr Tesfaye   [] Specialty/Provider -     Medication: pantoprazole     Dose/Frequency: 40 mg daily     Quantity: 90    Pharmacy: [facility]     Does the patient have enough for 3 days?   [x] Yes   [] No - Send as HP to POD

## 2024-08-09 NOTE — TELEPHONE ENCOUNTER
I spoke to patient and her gastroenterologist discontinued Pantoprazole. She now needs it for reflux after eating. Her next appointment with you 9/26 awv. Could this be refilled  so she has enough till her appointment?( [facility]

## 2024-09-01 DIAGNOSIS — K21.9 GASTROESOPHAGEAL REFLUX DISEASE, UNSPECIFIED WHETHER ESOPHAGITIS PRESENT: ICD-10-CM

## 2024-09-02 RX ORDER — PANTOPRAZOLE SODIUM 40 MG/1
40 TABLET, DELAYED RELEASE ORAL
Qty: 90 TABLET | Refills: 1 | Status: SHIPPED | OUTPATIENT
Start: 2024-09-02

## 2024-09-03 DIAGNOSIS — F41.9 ANXIETY: ICD-10-CM

## 2024-09-03 NOTE — TELEPHONE ENCOUNTER
Medication Refill Request     Name  LORazepam (ATIVAN) 0.5 mg tablet   Dose/Frequency Take 1 tablet (0.5 mg total) by mouth daily as needed for anxiety   Quantity 30 tablet   Verified pharmacy   [x]  Verified ordering Provider   [x]  Does patient have enough for the next 3 days? Yes [x] No []

## 2024-09-05 RX ORDER — LORAZEPAM 0.5 MG/1
0.5 TABLET ORAL DAILY PRN
Qty: 30 TABLET | Refills: 0 | Status: SHIPPED | OUTPATIENT
Start: 2024-09-05

## 2024-10-18 ENCOUNTER — OFFICE VISIT (OUTPATIENT)
Dept: FAMILY MEDICINE CLINIC | Facility: CLINIC | Age: 69
End: 2024-10-18
Payer: MEDICARE

## 2024-10-18 VITALS
OXYGEN SATURATION: 95 % | HEART RATE: 60 BPM | WEIGHT: 151.8 LBS | DIASTOLIC BLOOD PRESSURE: 82 MMHG | BODY MASS INDEX: 25.29 KG/M2 | SYSTOLIC BLOOD PRESSURE: 124 MMHG | TEMPERATURE: 98.8 F | RESPIRATION RATE: 16 BRPM | HEIGHT: 65 IN

## 2024-10-18 DIAGNOSIS — I10 BENIGN ESSENTIAL HYPERTENSION: ICD-10-CM

## 2024-10-18 DIAGNOSIS — F41.9 ANXIETY: ICD-10-CM

## 2024-10-18 DIAGNOSIS — E89.0 POSTPROCEDURAL HYPOTHYROIDISM: ICD-10-CM

## 2024-10-18 DIAGNOSIS — M54.41 CHRONIC BILATERAL LOW BACK PAIN WITH BILATERAL SCIATICA: ICD-10-CM

## 2024-10-18 DIAGNOSIS — Z00.00 MEDICARE ANNUAL WELLNESS VISIT, SUBSEQUENT: Primary | ICD-10-CM

## 2024-10-18 DIAGNOSIS — E78.2 MIXED HYPERLIPIDEMIA: ICD-10-CM

## 2024-10-18 DIAGNOSIS — G89.29 CHRONIC BILATERAL LOW BACK PAIN WITH BILATERAL SCIATICA: ICD-10-CM

## 2024-10-18 DIAGNOSIS — M54.42 CHRONIC BILATERAL LOW BACK PAIN WITH BILATERAL SCIATICA: ICD-10-CM

## 2024-10-18 PROCEDURE — G0439 PPPS, SUBSEQ VISIT: HCPCS | Performed by: FAMILY MEDICINE

## 2024-10-18 RX ORDER — METHOCARBAMOL 500 MG/1
500 TABLET, FILM COATED ORAL 4 TIMES DAILY
Qty: 56 TABLET | Refills: 0 | Status: SHIPPED | OUTPATIENT
Start: 2024-10-18 | End: 2024-11-01

## 2024-10-18 NOTE — PROGRESS NOTES
Ambulatory Visit  Name: Melba Busby      : 1955      MRN: [id number]  Encounter Provider: Florida Tesfaye DO  Encounter Date: 10/18/2024   Encounter department: [facility]    Assessment & Plan  Postprocedural hypothyroidism    Orders:    Lipid panel; Future    Comprehensive metabolic panel; Future    CBC and differential; Future    TSH, 3rd generation with Free T4 reflex; Future    UA (URINE) with reflex to Scope; Future    Lipid panel    Comprehensive metabolic panel    CBC and differential    TSH, 3rd generation with Free T4 reflex    UA (URINE) with reflex to Scope    Benign essential hypertension    Orders:    Lipid panel; Future    Comprehensive metabolic panel; Future    CBC and differential; Future    TSH, 3rd generation with Free T4 reflex; Future    UA (URINE) with reflex to Scope; Future    Lipid panel    Comprehensive metabolic panel    CBC and differential    TSH, 3rd generation with Free T4 reflex    UA (URINE) with reflex to Scope    Anxiety    Orders:    Lipid panel; Future    Comprehensive metabolic panel; Future    CBC and differential; Future    TSH, 3rd generation with Free T4 reflex; Future    UA (URINE) with reflex to Scope; Future    Lipid panel    Comprehensive metabolic panel    CBC and differential    TSH, 3rd generation with Free T4 reflex    UA (URINE) with reflex to Scope    Mixed hyperlipidemia    Orders:    Lipid panel; Future    Comprehensive metabolic panel; Future    CBC and differential; Future    TSH, 3rd generation with Free T4 reflex; Future    UA (URINE) with reflex to Scope; Future    Lipid panel    Comprehensive metabolic panel    CBC and differential    TSH, 3rd generation with Free T4 reflex    UA (URINE) with reflex to Scope    Medicare annual wellness visit, subsequent    Orders:    Lipid panel; Future    Comprehensive metabolic panel; Future    CBC and differential; Future    TSH, 3rd generation with Free T4 reflex; Future     UA (URINE) with reflex to Scope; Future    Lipid panel    Comprehensive metabolic panel    CBC and differential    TSH, 3rd generation with Free T4 reflex    UA (URINE) with reflex to Scope    Chronic bilateral low back pain with bilateral sciatica    Orders:    methocarbamol (ROBAXIN) 500 mg tablet; Take 1 tablet (500 mg total) by mouth 4 (four) times a day for 14 days       Preventive health issues were discussed with patient, and age appropriate screening tests were ordered as noted in patient's After Visit Summary. Personalized health advice and appropriate referrals for health education or preventive services given if needed, as noted in patient's After Visit Summary.    History of Present Illness     HPI   Patient Care Team:  Florida Tesfaye DO as PCP - General (Family Medicine)  MD Derick Springer MD    Review of Systems   Constitutional:  Negative for chills, fatigue and fever.   HENT:  Negative for congestion, postnasal drip, rhinorrhea and sinus pressure.    Eyes:  Negative for photophobia and visual disturbance.   Respiratory:  Negative for cough and shortness of breath.    Cardiovascular:  Negative for chest pain, palpitations and leg swelling.   Gastrointestinal:  Negative for abdominal pain, constipation, diarrhea, nausea and vomiting.   Genitourinary:  Negative for difficulty urinating and dysuria.   Musculoskeletal:  Negative for arthralgias and myalgias.   Skin:  Negative for color change and rash.   Neurological:  Negative for dizziness, weakness, light-headedness and headaches.     Medical History Reviewed by provider this encounter:  Tobacco  Allergies  Meds  Problems  Med Hx  Surg Hx  Fam Hx       Annual Wellness Visit Questionnaire   Melba is here for her Subsequent Wellness visit. Last Medicare Wellness visit information reviewed, patient interviewed, no change since last AWV.     Health Risk Assessment:   Patient rates overall health as good. Patient  feels that their physical health rating is same. Patient is satisfied with their life. Eyesight was rated as slightly worse. Hearing was rated as slightly worse. Patient feels that their emotional and mental health rating is same. Patients states they are sometimes angry. Patient states they are often unusually tired/fatigued. Pain experienced in the last 7 days has been none. Patient states that she has experienced no weight loss or gain in last 6 months.     Depression Screening:   PHQ-2 Score: 0      Fall Risk Screening:   In the past year, patient has experienced: no history of falling in past year      Urinary Incontinence Screening:   Patient has leaked urine accidently in the last six months.     Home Safety:  Patient does not have trouble with stairs inside or outside of their home. Patient has working smoke alarms and has working carbon monoxide detector. Home safety hazards include: none.     Nutrition:   Current diet is Regular.     Medications:   Patient is not currently taking any over-the-counter supplements. Patient is able to manage medications.     Activities of Daily Living (ADLs)/Instrumental Activities of Daily Living (IADLs):   Walk and transfer into and out of bed and chair?: Yes  Dress and groom yourself?: Yes    Bathe or shower yourself?: Yes    Feed yourself? Yes  Do your laundry/housekeeping?: Yes  Manage your money, pay your bills and track your expenses?: Yes  Make your own meals?: Yes    Do your own shopping?: Yes    Previous Hospitalizations:   Any hospitalizations or ED visits within the last 12 months?: No      Advance Care Planning:   Living will: Yes    Durable POA for healthcare: Yes    Advanced directive: Yes    Advanced directive counseling given: Yes      Cognitive Screening:   Provider or family/friend/caregiver concerned regarding cognition?: No    PREVENTIVE SCREENINGS      Cardiovascular Screening:    General: Screening Not Indicated, History Lipid Disorder and Risks and  Benefits Discussed    Due for: Lipid Panel      Diabetes Screening:     General: Screening Current and Risks and Benefits Discussed    Due for: Blood Glucose      Colorectal Cancer Screening:     General: Screening Current      Breast Cancer Screening:     General: Screening Current      Cervical Cancer Screening:    General: Screening Not Indicated      Osteoporosis Screening:    General: Screening Current      Lung Cancer Screening:     General: Screening Not Indicated      Hepatitis C Screening:    General: Screening Current    Screening, Brief Intervention, and Referral to Treatment (SBIRT)    Screening  Typical number of drinks in a day: 3  Typical number of drinks in a week: 10  Interpretation: Low risk drinking behavior.    AUDIT-C Screenin) How often did you have a drink containing alcohol in the past year? 2 to 3 times a week  2) How many drinks did you have on a typical day when you were drinking in the past year? 1 to 2  3) How often did you have 6 or more drinks on one occasion in the past year? daily or almost daily    AUDIT-C Score: 7  Interpretation: Score 3-12 (female): POSITIVE screen for alcohol misuse    AUDIT Screenin) How often during the last year have you found that you were not able to stop drinking once you had started? 0 - never  5) How often during the last year have you failed to do what was normally expected from you because of drinking? 0 - never  6) How often during the last year have you needed a first drink in the morning to get yourself going after a heavy drinking session? 0 - never  7) How often during the last year have you had a feeling of guilt or remorse after drinking? 1 - less than monthly  8) How often during the last year have you been unable to remember what happened the night before because you had been drinking? 0 - never  9) Have you or someone else been injured as a result of your drinking? 0 - no  10) Has a relative or friend or a doctor or another health  "worker been concerned about your drinking or suggested you cut down? 0 - no    AUDIT Score: 8  Interpretation: Harmful or hazardous alcohol consumption    Single Item Drug Screening:  How often have you used an illegal drug (including marijuana) or a prescription medication for non-medical reasons in the past year? never    Single Item Drug Screen Score: 0  Interpretation: Negative screen for possible drug use disorder    Social Determinants of Health     Financial Resource Strain: Low Risk  (9/7/2023)    Overall Financial Resource Strain (CARDIA)     Difficulty of Paying Living Expenses: Not hard at all   Food Insecurity: No Food Insecurity (10/18/2024)    Hunger Vital Sign     Worried About Running Out of Food in the Last Year: Never true     Ran Out of Food in the Last Year: Never true   Transportation Needs: No Transportation Needs (10/18/2024)    PRAPARE - Transportation     Lack of Transportation (Medical): No     Lack of Transportation (Non-Medical): No   Housing Stability: Unknown (10/18/2024)    Housing Stability Vital Sign     Unable to Pay for Housing in the Last Year: No     Homeless in the Last Year: No   Utilities: Not At Risk (10/18/2024)    [facility] Utilities     Threatened with loss of utilities: No     Vision Screening    Right eye Left eye Both eyes   Without correction      With correction   20/20       Objective     /82 (BP Location: Left arm, Patient Position: Sitting, Cuff Size: Standard)   Pulse 60   Temp 98.8 °F (37.1 °C) (Tympanic)   Resp 16   Ht 5' 5\" (1.651 m)   Wt 68.9 kg (151 lb 12.8 oz)   LMP  (LMP Unknown)   SpO2 95%   BMI 25.26 kg/m²     Physical Exam  Constitutional:       General: She is not in acute distress.     Appearance: Normal appearance. She is not ill-appearing, toxic-appearing or diaphoretic.   HENT:      Head: Normocephalic and atraumatic.      Right Ear: Tympanic membrane and ear canal normal.      Left Ear: Tympanic membrane and ear canal normal.      Nose: " Nose normal. No congestion.      Mouth/Throat:      Mouth: Mucous membranes are moist.      Pharynx: Oropharynx is clear. No oropharyngeal exudate.   Eyes:      Extraocular Movements: Extraocular movements intact.      Conjunctiva/sclera: Conjunctivae normal.      Pupils: Pupils are equal, round, and reactive to light.   Cardiovascular:      Rate and Rhythm: Normal rate and regular rhythm.      Pulses: Normal pulses.      Heart sounds: No murmur heard.  Pulmonary:      Effort: Pulmonary effort is normal.      Breath sounds: Normal breath sounds. No wheezing, rhonchi or rales.   Abdominal:      General: Bowel sounds are normal. There is no distension.      Palpations: Abdomen is soft.      Tenderness: There is no abdominal tenderness.   Musculoskeletal:         General: No swelling or tenderness. Normal range of motion.      Cervical back: Normal range of motion and neck supple.   Skin:     General: Skin is warm and dry.      Capillary Refill: Capillary refill takes less than 2 seconds.   Neurological:      General: No focal deficit present.      Mental Status: She is alert and oriented to person, place, and time.      Cranial Nerves: No cranial nerve deficit.   Psychiatric:         Mood and Affect: Mood normal.         Behavior: Behavior normal.         Thought Content: Thought content normal.

## 2024-10-18 NOTE — PATIENT INSTRUCTIONS
Medicare Preventive Visit Patient Instructions  Thank you for completing your Welcome to Medicare Visit or Medicare Annual Wellness Visit today. Your next wellness visit will be due in one year (10/19/2025).  The screening/preventive services that you may require over the next 5-10 years are detailed below. Some tests may not apply to you based off risk factors and/or age. Screening tests ordered at today's visit but not completed yet may show as past due. Also, please note that scanned in results may not display below.  Preventive Screenings:  Service Recommendations Previous Testing/Comments   Colorectal Cancer Screening  * Colonoscopy    * Fecal Occult Blood Test (FOBT)/Fecal Immunochemical Test (FIT)  * Fecal DNA/Cologuard Test  * Flexible Sigmoidoscopy Age: 45-75 years old   Colonoscopy: every 10 years (may be performed more frequently if at higher risk)  OR  FOBT/FIT: every 1 year  OR  Cologuard: every 3 years  OR  Sigmoidoscopy: every 5 years  Screening may be recommended earlier than age 45 if at higher risk for colorectal cancer. Also, an individualized decision between you and your healthcare provider will decide whether screening between the ages of 76-85 would be appropriate. Colonoscopy: 08/14/2023  FOBT/FIT: Not on file  Cologuard: Not on file  Sigmoidoscopy: Not on file    Screening Current     Breast Cancer Screening Age: 40+ years old  Frequency: every 1-2 years  Not required if history of left and right mastectomy Mammogram: 03/18/2024    Screening Current   Cervical Cancer Screening Between the ages of 21-29, pap smear recommended once every 3 years.   Between the ages of 30-65, can perform pap smear with HPV co-testing every 5 years.   Recommendations may differ for women with a history of total hysterectomy, cervical cancer, or abnormal pap smears in past. Pap Smear: 01/30/2023    Screening Not Indicated   Hepatitis C Screening Once for adults born between 1945 and 1965  More frequently in  patients at high risk for Hepatitis C Hep C Antibody: 01/29/2019    Screening Current   Diabetes Screening 1-2 times per year if you're at risk for diabetes or have pre-diabetes Fasting glucose: 98 mg/dL (4/8/2024)  A1C: No results in last 5 years (No results in last 5 years)  Screening Current   Cholesterol Screening Once every 5 years if you don't have a lipid disorder. May order more often based on risk factors. Lipid panel: 09/11/2023    Screening Not Indicated  History Lipid Disorder     Other Preventive Screenings Covered by Medicare:  Abdominal Aortic Aneurysm (AAA) Screening: covered once if your at risk. You're considered to be at risk if you have a family history of AAA.  Lung Cancer Screening: covers low dose CT scan once per year if you meet all of the following conditions: (1) Age 55-77; (2) No signs or symptoms of lung cancer; (3) Current smoker or have quit smoking within the last 15 years; (4) You have a tobacco smoking history of at least 20 pack years (packs per day multiplied by number of years you smoked); (5) You get a written order from a healthcare provider.  Glaucoma Screening: covered annually if you're considered high risk: (1) You have diabetes OR (2) Family history of glaucoma OR (3)  aged 50 and older OR (4)  American aged 65 and older  Osteoporosis Screening: covered every 2 years if you meet one of the following conditions: (1) You're estrogen deficient and at risk for osteoporosis based off medical history and other findings; (2) Have a vertebral abnormality; (3) On glucocorticoid therapy for more than 3 months; (4) Have primary hyperparathyroidism; (5) On osteoporosis medications and need to assess response to drug therapy.   Last bone density test (DXA Scan): 09/16/2022.  HIV Screening: covered annually if you're between the age of 15-65. Also covered annually if you are younger than 15 and older than 65 with risk factors for HIV infection. For pregnant  patients, it is covered up to 3 times per pregnancy.    Immunizations:  Immunization Recommendations   Influenza Vaccine Annual influenza vaccination during flu season is recommended for all persons aged >= 6 months who do not have contraindications   Pneumococcal Vaccine   * Pneumococcal conjugate vaccine = PCV13 (Prevnar 13), PCV15 (Vaxneuvance), PCV20 (Prevnar 20)  * Pneumococcal polysaccharide vaccine = PPSV23 (Pneumovax) Adults 19-63 yo with certain risk factors or if 65+ yo  If never received any pneumonia vaccine: recommend Prevnar 20 (PCV20)  Give PCV20 if previously received 1 dose of PCV13 or PPSV23   Hepatitis B Vaccine 3 dose series if at intermediate or high risk (ex: diabetes, end stage renal disease, liver disease)   Respiratory syncytial virus (RSV) Vaccine - COVERED BY MEDICARE PART D  * RSVPreF3 (Arexvy) CDC recommends that adults 60 years of age and older may receive a single dose of RSV vaccine using shared clinical decision-making (SCDM)   Tetanus (Td) Vaccine - COST NOT COVERED BY MEDICARE PART B Following completion of primary series, a booster dose should be given every 10 years to maintain immunity against tetanus. Td may also be given as tetanus wound prophylaxis.   Tdap Vaccine - COST NOT COVERED BY MEDICARE PART B Recommended at least once for all adults. For pregnant patients, recommended with each pregnancy.   Shingles Vaccine (Shingrix) - COST NOT COVERED BY MEDICARE PART B  2 shot series recommended in those 19 years and older who have or will have weakened immune systems or those 50 years and older     Health Maintenance Due:      Topic Date Due   • Cervical Cancer Screening  03/09/2021   • Breast Cancer Screening: Mammogram  03/18/2025   • DXA SCAN  09/16/2027   • Colorectal Cancer Screening  08/11/2033   • Hepatitis C Screening  Completed     Immunizations Due:      Topic Date Due   • Influenza Vaccine (1) 09/01/2024   • COVID-19 Vaccine (6 - 2023-24 season) 09/01/2024     Advance  Directives   What are advance directives?  Advance directives are legal documents that state your wishes and plans for medical care. These plans are made ahead of time in case you lose your ability to make decisions for yourself. Advance directives can apply to any medical decision, such as the treatments you want, and if you want to donate organs.   What are the types of advance directives?  There are many types of advance directives, and each state has rules about how to use them. You may choose a combination of any of the following:  Living will:  This is a written record of the treatment you want. You can also choose which treatments you do not want, which to limit, and which to stop at a certain time. This includes surgery, medicine, IV fluid, and tube feedings.   Durable power of  for healthcare (DPAHC):  This is a written record that states who you want to make healthcare choices for you when you are unable to make them for yourself. This person, called a proxy, is usually a family member or a friend. You may choose more than 1 proxy.  Do not resuscitate (DNR) order:  A DNR order is used in case your heart stops beating or you stop breathing. It is a request not to have certain forms of treatment, such as CPR. A DNR order may be included in other types of advance directives.  Medical directive:  This covers the care that you want if you are in a coma, near death, or unable to make decisions for yourself. You can list the treatments you want for each condition. Treatment may include pain medicine, surgery, blood transfusions, dialysis, IV or tube feedings, and a ventilator (breathing machine).  Values history:  This document has questions about your views, beliefs, and how you feel and think about life. This information can help others choose the care that you would choose.  Why are advance directives important?  An advance directive helps you control your care. Although spoken wishes may be used, it  is better to have your wishes written down. Spoken wishes can be misunderstood, or not followed. Treatments may be given even if you do not want them. An advance directive may make it easier for your family to make difficult choices about your care.   Urinary Incontinence   Urinary incontinence (UI)  is when you lose control of your bladder. UI develops because your bladder cannot store or empty urine properly. The 3 most common types of UI are stress incontinence, urge incontinence, or both.  Medicines:   May be given to help strengthen your bladder control. Report any side effects of medication to your healthcare provider.  Do pelvic muscle exercises often:  Your pelvic muscles help you stop urinating. Squeeze these muscles tight for 5 seconds, then relax for 5 seconds. Gradually work up to squeezing for 10 seconds. Do 3 sets of 15 repetitions a day, or as directed. This will help strengthen your pelvic muscles and improve bladder control.  Train your bladder:  Go to the bathroom at set times, such as every 2 hours, even if you do not feel the urge to go. You can also try to hold your urine when you feel the urge to go. For example, hold your urine for 5 minutes when you feel the urge to go. As that becomes easier, hold your urine for 10 minutes.   Self-care:   Keep a UI record.  Write down how often you leak urine and how much you leak. Make a note of what you were doing when you leaked urine.  Drink liquids as directed. You may need to limit the amount of liquid you drink to help control your urine leakage. Do not drink any liquid right before you go to bed. Limit or do not have drinks that contain caffeine or alcohol.   Prevent constipation.  Eat a variety of high-fiber foods. Good examples are high-fiber cereals, beans, vegetables, and whole-grain breads. Walking is the best way to trigger your intestines to have a bowel movement.  Exercise regularly and maintain a healthy weight.  Weight loss and exercise  will decrease pressure on your bladder and help you control your leakage.   Use a catheter as directed  to help empty your bladder. A catheter is a tiny, plastic tube that is put into your bladder to drain your urine.   Go to behavior therapy as directed.  Behavior therapy may be used to help you learn to control your urge to urinate.    Weight Management   Why it is important to manage your weight:  Being overweight increases your risk of health conditions such as heart disease, high blood pressure, type 2 diabetes, and certain types of cancer. It can also increase your risk for osteoarthritis, sleep apnea, and other respiratory problems. Aim for a slow, steady weight loss. Even a small amount of weight loss can lower your risk of health problems.  How to lose weight safely:  A safe and healthy way to lose weight is to eat fewer calories and get regular exercise. You can lose up about 1 pound a week by decreasing the number of calories you eat by 500 calories each day.   Healthy meal plan for weight management:  A healthy meal plan includes a variety of foods, contains fewer calories, and helps you stay healthy. A healthy meal plan includes the following:  Eat whole-grain foods more often.  A healthy meal plan should contain fiber. Fiber is the part of grains, fruits, and vegetables that is not broken down by your body. Whole-grain foods are healthy and provide extra fiber in your diet. Some examples of whole-grain foods are whole-wheat breads and pastas, oatmeal, brown rice, and bulgur.  Eat a variety of vegetables every day.  Include dark, leafy greens such as spinach, kale, lucille greens, and mustard greens. Eat yellow and orange vegetables such as carrots, sweet potatoes, and winter squash.   Eat a variety of fruits every day.  Choose fresh or canned fruit (canned in its own juice or light syrup) instead of juice. Fruit juice has very little or no fiber.  Eat low-fat dairy foods.  Drink fat-free (skim) milk or  "1% milk. Eat fat-free yogurt and low-fat cottage cheese. Try low-fat cheeses such as mozzarella and other reduced-fat cheeses.  Choose meat and other protein foods that are low in fat.  Choose beans or other legumes such as split peas or lentils. Choose fish, skinless poultry (chicken or turkey), or lean cuts of red meat (beef or pork). Before you cook meat or poultry, cut off any visible fat.   Use less fat and oil.  Try baking foods instead of frying them. Add less fat, such as margarine, sour cream, regular salad dressing and mayonnaise to foods. Eat fewer high-fat foods. Some examples of high-fat foods include french fries, doughnuts, ice cream, and cakes.  Eat fewer sweets.  Limit foods and drinks that are high in sugar. This includes candy, cookies, regular soda, and sweetened drinks.  Exercise:  Exercise at least 30 minutes per day on most days of the week. Some examples of exercise include walking, biking, dancing, and swimming. You can also fit in more physical activity by taking the stairs instead of the elevator or parking farther away from stores. Ask your healthcare provider about the best exercise plan for you.   Alcohol Use and Your Health    Drinking too much can harm your health.  Excessive alcohol use leads to about 88,000 death in the [place] each year, and shortens the life of those who diet by almost 30 years.  Further, excessive drinking cost the economy $249 billion in 2010.  Most excessive drinkers are not alcohol dependent.    Excessive alcohol use has immediate effects that increase the risk of many harmful health conditions.  These are most often the result of binge drinking.  Over time, excessive alcohol use can lead to the development of chronic diseases and other series health problems.    What is considered a \"drink\"?        Excessive alcohol use includes:  Binge Drinking: For women, 4 or more drinks consumed on one occasion. For men, 5 or more drinks consumed on one " occasion.  Heavy Drinking: For women, 8 or more drinks per week. For men, 15 or more drinks per week  Any alcohol used by pregnant women  Any alcohol used by those under the age of 21 years    If you choose to drink, do so in moderation:  Do not drink at all if you are under the age of 21, or if you are or may be pregnant, or have health problems that could be made worse by drinking.  For women, up to 1 drink per day  For men, up to 2 drinks a day    No one should begin drinking or drink more frequently based on potential health benefits    Short-Term Health Risks:  Injuries: motor vehicle crashes, falls, drownings, burns  Violence: homicide, suicide, sexual assault, intimate partner violence  Alcohol poisoning  Reproductive health: risky sexual behaviors, unintended prengnacy, sexually transmitted diseases, miscarriage, stillbirth, fetal alcohol syndrome    Long-Term Health Risks:  Chronic diseases: high blood pressure, heart disease, stroke, liver disease, digestive problems  Cancers: breast, mouth and throat, liver, colon  Learning and memory problems: dementia, poor school performance  Mental health: depression, anxiety, insomnia  Social problems: lost productivity, family problems, unemployment  Alcohol dependence    For support and more information:  Substance Abuse and Mental Health Services Administration  [address]  [place] [address]  Web Address: http://www.samhsa.gov    Alcoholics Anonymous        Web Address: http://www.aa.org    https://www.cdc.gov/alcohol/fact-sheets/alcohol-use.htm     © Copyright [employer] 2018 Information is for End User's use only and may not be sold, redistributed or otherwise used for commercial purposes. All illustrations and images included in CareNotes® are the copyrighted property of [employer].D[employer]M., Inc[employer] or [employer]

## 2024-10-18 NOTE — ASSESSMENT & PLAN NOTE
Orders:    methocarbamol (ROBAXIN) 500 mg tablet; Take 1 tablet (500 mg total) by mouth 4 (four) times a day for 14 days

## 2024-10-29 DIAGNOSIS — F41.9 ANXIETY: ICD-10-CM

## 2024-10-29 LAB
ALBUMIN SERPL-MCNC: 4.5 G/DL (ref 3.9–4.9)
ALP SERPL-CCNC: 78 IU/L (ref 44–121)
ALT SERPL-CCNC: 33 IU/L (ref 0–32)
APPEARANCE UR: CLEAR
AST SERPL-CCNC: 24 IU/L (ref 0–40)
BACTERIA URNS QL MICRO: ABNORMAL
BASOPHILS # BLD AUTO: 0.1 X10E3/UL (ref 0–0.2)
BASOPHILS NFR BLD AUTO: 1 %
BILIRUB SERPL-MCNC: 0.5 MG/DL (ref 0–1.2)
BILIRUB UR QL STRIP: NEGATIVE
BUN SERPL-MCNC: 15 MG/DL (ref 8–27)
BUN/CREAT SERPL: 21 (ref 12–28)
CALCIUM SERPL-MCNC: 9.5 MG/DL (ref 8.7–10.3)
CASTS URNS QL MICRO: ABNORMAL /LPF
CHLORIDE SERPL-SCNC: 101 MMOL/L (ref 96–106)
CHOLEST SERPL-MCNC: 222 MG/DL (ref 100–199)
CHOLEST/HDLC SERPL: 4.4 RATIO (ref 0–4.4)
CO2 SERPL-SCNC: 24 MMOL/L (ref 20–29)
COLOR UR: YELLOW
CREAT SERPL-MCNC: 0.7 MG/DL (ref 0.57–1)
EGFR: 94 ML/MIN/1.73
EOSINOPHIL # BLD AUTO: 0.1 X10E3/UL (ref 0–0.4)
EOSINOPHIL NFR BLD AUTO: 2 %
EPI CELLS #/AREA URNS HPF: ABNORMAL /HPF (ref 0–10)
ERYTHROCYTE [DISTWIDTH] IN BLOOD BY AUTOMATED COUNT: 12.1 % (ref 11.7–15.4)
GLOBULIN SER-MCNC: 2.4 G/DL (ref 1.5–4.5)
GLUCOSE SERPL-MCNC: 97 MG/DL (ref 70–99)
GLUCOSE UR QL: NEGATIVE
HCT VFR BLD AUTO: 42.3 % (ref 34–46.6)
HDLC SERPL-MCNC: 51 MG/DL
HGB BLD-MCNC: 14.1 G/DL (ref 11.1–15.9)
HGB UR QL STRIP: NEGATIVE
IMM GRANULOCYTES # BLD: 0 X10E3/UL (ref 0–0.1)
IMM GRANULOCYTES NFR BLD: 0 %
KETONES UR QL STRIP: NEGATIVE
LDLC SERPL CALC-MCNC: 139 MG/DL (ref 0–99)
LEUKOCYTE ESTERASE UR QL STRIP: ABNORMAL
LYMPHOCYTES # BLD AUTO: 1.7 X10E3/UL (ref 0.7–3.1)
LYMPHOCYTES NFR BLD AUTO: 35 %
MCH RBC QN AUTO: 30.6 PG (ref 26.6–33)
MCHC RBC AUTO-ENTMCNC: 33.3 G/DL (ref 31.5–35.7)
MCV RBC AUTO: 92 FL (ref 79–97)
MICRO URNS: ABNORMAL
MONOCYTES # BLD AUTO: 0.4 X10E3/UL (ref 0.1–0.9)
MONOCYTES NFR BLD AUTO: 7 %
NEUTROPHILS # BLD AUTO: 2.7 X10E3/UL (ref 1.4–7)
NEUTROPHILS NFR BLD AUTO: 55 %
NITRITE UR QL STRIP: POSITIVE
PH UR STRIP: 6.5 [PH] (ref 5–7.5)
PLATELET # BLD AUTO: 283 X10E3/UL (ref 150–450)
POTASSIUM SERPL-SCNC: 4.5 MMOL/L (ref 3.5–5.2)
PROT SERPL-MCNC: 6.9 G/DL (ref 6–8.5)
PROT UR QL STRIP: NEGATIVE
RBC # BLD AUTO: 4.61 X10E6/UL (ref 3.77–5.28)
RBC #/AREA URNS HPF: ABNORMAL /HPF (ref 0–2)
SL AMB VLDL CHOLESTEROL CALC: 32 MG/DL (ref 5–40)
SODIUM SERPL-SCNC: 138 MMOL/L (ref 134–144)
SP GR UR: 1.02 (ref 1–1.03)
TRIGL SERPL-MCNC: 177 MG/DL (ref 0–149)
TSH SERPL DL<=0.005 MIU/L-ACNC: 3.11 UIU/ML (ref 0.45–4.5)
UROBILINOGEN UR STRIP-ACNC: 0.2 MG/DL (ref 0.2–1)
WBC # BLD AUTO: 5 X10E3/UL (ref 3.4–10.8)
WBC #/AREA URNS HPF: ABNORMAL /HPF (ref 0–5)

## 2024-10-31 RX ORDER — LORAZEPAM 0.5 MG/1
0.5 TABLET ORAL DAILY PRN
Qty: 30 TABLET | Refills: 0 | Status: SHIPPED | OUTPATIENT
Start: 2024-10-31

## 2024-12-19 ENCOUNTER — OFFICE VISIT (OUTPATIENT)
Dept: GASTROENTEROLOGY | Facility: CLINIC | Age: 69
End: 2024-12-19
Payer: MEDICARE

## 2024-12-19 VITALS
WEIGHT: 154 LBS | HEIGHT: 65 IN | BODY MASS INDEX: 25.66 KG/M2 | SYSTOLIC BLOOD PRESSURE: 128 MMHG | DIASTOLIC BLOOD PRESSURE: 82 MMHG

## 2024-12-19 DIAGNOSIS — K64.8 INTERNAL HEMORRHOID: Primary | ICD-10-CM

## 2024-12-19 PROCEDURE — 99213 OFFICE O/P EST LOW 20 MIN: CPT | Performed by: STUDENT IN AN ORGANIZED HEALTH CARE EDUCATION/TRAINING PROGRAM

## 2024-12-19 RX ORDER — LEVOTHYROXINE SODIUM 88 UG/1
TABLET ORAL
COMMUNITY

## 2024-12-19 RX ORDER — HYDROCORTISONE ACETATE 25 MG/1
25 SUPPOSITORY RECTAL 2 TIMES DAILY
Qty: 10 SUPPOSITORY | Refills: 0 | Status: SHIPPED | OUTPATIENT
Start: 2024-12-19 | End: 2024-12-24

## 2024-12-19 NOTE — PROGRESS NOTES
Name: Melba Busby      : 1955      MRN: [id number]  Encounter Provider: Felix Aguilera DO  Encounter Date: 2024   Encounter department: [facility]  :  Assessment & Plan  Internal hemorrhoid  Grade 2-3 internal hemorrhoids on exam. Hydrocortisone suppository BID x 5 days and begin fiber supplementation. Reassess in 2-4 weeks to reassess symptoms and if not improved, proceed with banding.  Orders:    hydrocortisone (ANUSOL-HC) 25 mg suppository; Insert 1 suppository (25 mg total) into the rectum 2 (two) times a day for 5 days    psyllium (METAMUCIL) 58.6 % packet; Take 1 packet by mouth daily        History of Present Illness   HPI  Melba Busby is a 69 y.o. female who presents for evaluation of hemorrhoids. Internal hemorrhoids noted on colonoscopy in . For the past month she has had itching and 'rawness' and feels them 'pop out'. She has been using tucks pads and creams without relief. DEZ today with grade 2-3 prolapsed hemorrhoids and external skin tag. No straining with bowel movements but does sit on the toilet for a long time.      History obtained from: patient    Review of Systems  Current Outpatient Medications on File Prior to Visit   Medication Sig Dispense Refill    atenolol (TENORMIN) 25 mg tablet TAKE 1 TABLET (25 MG TOTAL) BY MOUTH DAILY AT 8 PM 90 tablet 1    LORazepam (ATIVAN) 0.5 mg tablet Take 1 tablet (0.5 mg total) by mouth daily as needed for anxiety 30 tablet 0    loteprednol etabonate (LOTEMAX) 0.5 % ophthalmic suspension INSTILL 1 DROP INTO EACH EYE 3X/DAY FOR 2 WEEKS THEN TAPER TO 2X/DAY FOR AN ADDITIONAL 2 WEEK S      Unithroid 88 MCG tablet       [place] Thyroid 15 MG tablet TAKE ONE TABLET BY MOUTH EVERY DAY BEFORE BREAKFAST      [place] Thyroid 60 MG tablet Take 60 mg by mouth daily       ibuprofen (MOTRIN) 200 mg tablet every 6 (six) hours. (Patient not taking: Reported on 10/18/2024)      methocarbamol  "(ROBAXIN) 500 mg tablet Take 1 tablet (500 mg total) by mouth 4 (four) times a day for 14 days 56 tablet 0    pantoprazole (PROTONIX) 40 mg tablet TAKE 1 TABLET BY MOUTH DAILY BEFORE BREAKFAST (Patient not taking: Reported on 10/18/2024) 90 tablet 1     No current facility-administered medications on file prior to visit.      Social History     Tobacco Use    Smoking status: Former     Current packs/day: 0.00     Average packs/day: 1 pack/day for 30.0 years (30.0 ttl pk-yrs)     Types: Cigarettes     Start date: 1967     Quit date: 1997     Years since quittin.9     Passive exposure: Past    Smokeless tobacco: Never   Vaping Use    Vaping status: Never Used   Substance and Sexual Activity    Alcohol use: Yes     Alcohol/week: 14.0 standard drinks of alcohol     Types: 14 Glasses of wine per week     Comment: Social weekly    Drug use: No    Sexual activity: Yes     Partners: Male     Birth control/protection: Post-menopausal, None        Objective   /82 (BP Location: Left arm, Patient Position: Sitting, Cuff Size: Adult)   Ht 5' 5\" (1.651 m)   Wt 69.9 kg (154 lb)   LMP  (LMP Unknown)   BMI 25.63 kg/m²      Physical Exam    General Appearance: NAD, cooperative, alert  Eyes: Anicteric  GI:  Soft, non-tender, non-distended; normal bowel sounds; no masses, no organomegaly   Rectal: Grade 2-3 hemorrhoids, skin tag  Musculoskeletal: No edema.  Skin:     No jaundice    "

## 2025-01-02 DIAGNOSIS — F41.9 ANXIETY: ICD-10-CM

## 2025-01-03 RX ORDER — LORAZEPAM 0.5 MG/1
0.5 TABLET ORAL DAILY PRN
Qty: 30 TABLET | Refills: 0 | Status: SHIPPED | OUTPATIENT
Start: 2025-01-03

## 2025-01-21 ENCOUNTER — OFFICE VISIT (OUTPATIENT)
Dept: GASTROENTEROLOGY | Facility: CLINIC | Age: 70
End: 2025-01-21
Payer: MEDICARE

## 2025-01-21 VITALS
HEIGHT: 65 IN | WEIGHT: 153.8 LBS | DIASTOLIC BLOOD PRESSURE: 80 MMHG | BODY MASS INDEX: 25.62 KG/M2 | SYSTOLIC BLOOD PRESSURE: 122 MMHG

## 2025-01-21 DIAGNOSIS — R10.9 ABDOMINAL CRAMPING: Primary | ICD-10-CM

## 2025-01-21 DIAGNOSIS — K64.2 PROLAPSED INTERNAL HEMORRHOIDS, GRADE 3: ICD-10-CM

## 2025-01-21 PROCEDURE — 99213 OFFICE O/P EST LOW 20 MIN: CPT | Performed by: STUDENT IN AN ORGANIZED HEALTH CARE EDUCATION/TRAINING PROGRAM

## 2025-01-21 NOTE — PATIENT INSTRUCTIONS
Your exam today showed internal hemorrhoids which were prolapsed.  We did place a single band today on one of the hemorrhoid columns.  Please let us know if you have any pain or bleeding in the next few days.  Otherwise if you continue to do well we will plan for a repeat banding in approximately 2 weeks.    Please do start a fiber supplement to see if this helps with some of your other lower GI symptoms including incomplete evacuation.  It would be interesting to see if this has any effect on your abdominal cramping.

## 2025-01-21 NOTE — PROGRESS NOTES
[facility] - Hemorrhoid Banding Melba Busby 69 y.o. female MRN: [id number]  Encounter: [id number]    ASSESSMENT AND PLAN:    The right posterior hemorrhoid area was banded 2025. The patient was instructed to avoid constipation and straining, and educated in appropriate fiber intake.     HPI: Melba Busby is a 69 y.o. year old female who presents with rectal bleeding due to hemorrhoids.     Previous treatments include: suppositories  Bands were previously placed: none   Current Fiber intake: encouraged supplementation  Complications of prior therapy include: none    The patient provided consent for banding  and was placed in the left lateral position  Rectal exam showed Grade III internal hemorrhoids  The O'Richie ligator was advanced and a band was applied without difficulty   Repeat rectal exam confirmed appropriate placement  The patient was discharged home after observation in the waiting area  The exam was chaperoned by nursing staff      Name: Melba Busby      : 1955      MRN: [id number]  Encounter Provider: Felix Aguilera DO  Encounter Date: 2025   Encounter department: [facility]  :  Assessment & Plan  Abdominal cramping    69-year-old female with history of internal hemorrhoids presenting for evaluation of lower abdominal cramping and follow-up of internal hemorrhoids.  She is not having any  or GYN complaints associated with the abdominal cramping.  This could be from constipation given the location of the pain overlying the sigmoid colon and there is improvement with bowel movement.  Colonoscopy from  reviewed, scattered diverticula noted, no other significant abnormalities.    Will plan to start fiber supplementation to help with any possible constipation related symptoms and her hemorrhoids.  She is describing episodes of incomplete evacuation and there was some soft residual stool on her  rectal exam today consistent with constipation.         Prolapsed internal hemorrhoids, grade 3    DEZ today notable for prolapsed internal hemorrhoids, nonbleeding.  She did have response to steroid suppository but now with over-the-counter creams.  Some perineal irritation and moisture was noted.  Her inflamed hemorrhoids can certainly be contributing to her episodes of incontinence.  We discussed risk and benefits of proceeding with hemorrhoid banding and proceeded with banding of the right posterior hemorrhoid today.  We will plan to see her back in 2 weeks for repeat banding.  Ongoing advisement of high-fiber diet, avoiding straining, prolonged time on the toilet.  She will start fiber supplement to see if this helps with her abdominal cramping symptoms as well as with her hemorrhoids.  She was further advised to stop over-the-counter creams as these are likely not helping.  She can use warm soap and water to clean the area and keep it as dry as possible.           History of Present Illness   HPI  Melba Busby is a 69 y.o. female who presents for follow-up of her internal hemorrhoids as well as new complaint of abdominal cramping.  After her last visit she did take steroid suppository for 5 days which helped greatly with her symptoms.  However her symptoms began shortly afterwards and were not improved with over-the-counter treatments and creams.  She is now having some perineal irritation from all of this.  She is also having some lower midline abdominal cramping similar to when she had a period.  It is improved with bowel movements.  It feels similar to when she had her menstrual cycle.  No significant pain or rectal bleeding.  She is also having some achiness in her bilateral thighs which is improved with Aleve.  No other symptoms.  No urinary complaints or vaginal bleeding.    She does note that when she moves her bowels she is a [place] 3-4, sometimes 5.  She does have episodes of leakage/incontinence  at times.        History obtained from: patient    Review of Systems  Current Outpatient Medications on File Prior to Visit   Medication Sig Dispense Refill    atenolol (TENORMIN) 25 mg tablet TAKE 1 TABLET (25 MG TOTAL) BY MOUTH DAILY AT 8 PM 90 tablet 1    ibuprofen (MOTRIN) 200 mg tablet every 6 (six) hours. (Patient taking differently: every 6 (six) hours as needed)      LORazepam (ATIVAN) 0.5 mg tablet Take 1 tablet (0.5 mg total) by mouth daily as needed for anxiety 30 tablet 0    loteprednol etabonate (LOTEMAX) 0.5 % ophthalmic suspension INSTILL 1 DROP INTO EACH EYE 3X/DAY FOR 2 WEEKS THEN TAPER TO 2X/DAY FOR AN ADDITIONAL 2 WEEK S      Unithroid 88 MCG tablet       [place] Thyroid 15 MG tablet TAKE ONE TABLET BY MOUTH EVERY DAY BEFORE BREAKFAST      [place] Thyroid 60 MG tablet Take 60 mg by mouth daily       hydrocortisone (ANUSOL-HC) 25 mg suppository Insert 1 suppository (25 mg total) into the rectum 2 (two) times a day for 5 days 10 suppository 0    methocarbamol (ROBAXIN) 500 mg tablet Take 1 tablet (500 mg total) by mouth 4 (four) times a day for 14 days 56 tablet 0    pantoprazole (PROTONIX) 40 mg tablet TAKE 1 TABLET BY MOUTH DAILY BEFORE BREAKFAST (Patient not taking: Reported on 10/18/2024) 90 tablet 1    psyllium (METAMUCIL) 58.6 % packet Take 1 packet by mouth daily 30 packet 0     No current facility-administered medications on file prior to visit.      Social History     Tobacco Use    Smoking status: Former     Current packs/day: 0.00     Average packs/day: 1 pack/day for 30.0 years (30.0 ttl pk-yrs)     Types: Cigarettes     Start date: 1967     Quit date: 1997     Years since quittin.0     Passive exposure: Past    Smokeless tobacco: Never   Vaping Use    Vaping status: Never Used   Substance and Sexual Activity    Alcohol use: Yes     Alcohol/week: 14.0 standard drinks of alcohol     Types: 14 Glasses of wine per week     Comment: Social weekly    Drug use: No    Sexual  "activity: Yes     Partners: Male     Birth control/protection: Post-menopausal, None        Objective   /80   Ht 5' 5\" (1.651 m)   Wt 69.8 kg (153 lb 12.8 oz)   LMP  (LMP Unknown)   BMI 25.59 kg/m²      Physical Exam    General Appearance: NAD, cooperative, alert  Eyes: Anicteric  GI:  Soft, non-tender, non-distended; normal bowel sounds; no masses, no organomegaly   Rectal: Grade 2-3 prolapse internal hemorrhoids, nonbleeding  Musculoskeletal: No edema.  Skin:     No jaundice    "

## 2025-02-06 ENCOUNTER — OFFICE VISIT (OUTPATIENT)
Dept: GASTROENTEROLOGY | Facility: CLINIC | Age: 70
End: 2025-02-06
Payer: MEDICARE

## 2025-02-06 VITALS
WEIGHT: 155 LBS | DIASTOLIC BLOOD PRESSURE: 80 MMHG | BODY MASS INDEX: 25.83 KG/M2 | HEIGHT: 65 IN | SYSTOLIC BLOOD PRESSURE: 118 MMHG

## 2025-02-06 DIAGNOSIS — K64.8 INTERNAL HEMORRHOID: ICD-10-CM

## 2025-02-06 PROCEDURE — 46221 LIGATION OF HEMORRHOID(S): CPT | Performed by: STUDENT IN AN ORGANIZED HEALTH CARE EDUCATION/TRAINING PROGRAM

## 2025-02-06 RX ORDER — HYDROCORTISONE ACETATE 25 MG/1
25 SUPPOSITORY RECTAL 2 TIMES DAILY
Qty: 10 SUPPOSITORY | Refills: 0 | Status: SHIPPED | OUTPATIENT
Start: 2025-02-06 | End: 2025-02-11

## 2025-02-06 NOTE — PATIENT INSTRUCTIONS
We placed a another band on your hemorrhoids today.  Continue fiber supplementation.  If you have any pain or bleeding please let me know.  We will see back in 2 weeks for your third banding (and hopefully last!) banding session.

## 2025-02-06 NOTE — PROGRESS NOTES
[facility] - Hemorrhoid Banding Melba Busby 69 y.o. female MRN: [id number]  Encounter: [id number]    ASSESSMENT AND PLAN:    The right anterior hemorrhoid area was banded 2/6/2025. The patient was instructed to avoid constipation and straining, and educated in appropriate fiber intake.     HPI: Melba Busby is a 69 y.o. year old female who presents with rectal bleeding due to hemorrhoids.     Previous treatments include: banding, suppositories, fiber  Bands were previously placed: right posterior   Current Fiber intake: encouraged supplementation  Complications of prior therapy include: none    The patient provided consent for banding  and was placed in the left lateral position  Rectal exam showed Grade III internal hemorrhoids  The O'[place] ligator was advanced and a band was applied without difficulty   Repeat rectal exam confirmed appropriate placement  The patient was discharged home after observation in the waiting area  The exam was chaperoned by nursing staff    Feeling better since use of fiber and last banding    She is traveling to [facility] soon. We will order steroid suppositories for her to have down there in case her symptoms flare up.

## 2025-02-07 DIAGNOSIS — I10 ESSENTIAL HYPERTENSION: ICD-10-CM

## 2025-02-07 DIAGNOSIS — I10 BENIGN ESSENTIAL HYPERTENSION: ICD-10-CM

## 2025-02-07 RX ORDER — ATENOLOL 25 MG/1
25 TABLET ORAL DAILY
Qty: 90 TABLET | Refills: 1 | Status: SHIPPED | OUTPATIENT
Start: 2025-02-07

## 2025-02-15 ENCOUNTER — APPOINTMENT (OUTPATIENT)
Dept: RADIOLOGY | Facility: CLINIC | Age: 70
End: 2025-02-15
Payer: MEDICARE

## 2025-02-15 ENCOUNTER — OFFICE VISIT (OUTPATIENT)
Dept: OBGYN CLINIC | Facility: CLINIC | Age: 70
End: 2025-02-15
Payer: MEDICARE

## 2025-02-15 VITALS — WEIGHT: 155 LBS | BODY MASS INDEX: 25.83 KG/M2 | HEIGHT: 65 IN

## 2025-02-15 DIAGNOSIS — M25.551 PAIN IN RIGHT HIP: ICD-10-CM

## 2025-02-15 DIAGNOSIS — M70.61 TROCHANTERIC BURSITIS OF RIGHT HIP: ICD-10-CM

## 2025-02-15 DIAGNOSIS — M54.41 ACUTE RIGHT-SIDED LOW BACK PAIN WITH RIGHT-SIDED SCIATICA: ICD-10-CM

## 2025-02-15 DIAGNOSIS — M16.11 PRIMARY OSTEOARTHRITIS OF ONE HIP, RIGHT: Primary | ICD-10-CM

## 2025-02-15 PROCEDURE — 73502 X-RAY EXAM HIP UNI 2-3 VIEWS: CPT

## 2025-02-15 PROCEDURE — 99204 OFFICE O/P NEW MOD 45 MIN: CPT | Performed by: FAMILY MEDICINE

## 2025-02-15 PROCEDURE — 20610 DRAIN/INJ JOINT/BURSA W/O US: CPT | Performed by: FAMILY MEDICINE

## 2025-02-15 RX ORDER — TRIAMCINOLONE ACETONIDE 40 MG/ML
40 INJECTION, SUSPENSION INTRA-ARTICULAR; INTRAMUSCULAR
Status: COMPLETED | OUTPATIENT
Start: 2025-02-15 | End: 2025-02-15

## 2025-02-15 RX ORDER — LIDOCAINE HYDROCHLORIDE 10 MG/ML
9 INJECTION, SOLUTION INFILTRATION; PERINEURAL
Status: COMPLETED | OUTPATIENT
Start: 2025-02-15 | End: 2025-02-15

## 2025-02-15 RX ADMIN — LIDOCAINE HYDROCHLORIDE 9 ML: 10 INJECTION, SOLUTION INFILTRATION; PERINEURAL at 09:00

## 2025-02-15 RX ADMIN — TRIAMCINOLONE ACETONIDE 40 MG: 40 INJECTION, SUSPENSION INTRA-ARTICULAR; INTRAMUSCULAR at 09:00

## 2025-02-15 NOTE — PATIENT INSTRUCTIONS
Trial corticosteroid junction for lateral hip trochanteric bursitis.  I explained the patient that this may not provide complete relief as she also has evidence of osteoarthritis in the right hip causing radiation of pain down the thigh.  I recommended trial physical therapy for trochanter bursitis and also to return for ultrasound-guided corticosteroid injection to the right hip if she does not have satisfactory relief with trochanteric bursa injection today.  I also explained the patient that if she continues to have pain rating down the leg that is refractory to her current treatment for the hip then we will consider possible pinched nerve in her lumbar spine as source of her pain.

## 2025-02-15 NOTE — PROGRESS NOTES
1. Primary osteoarthritis of one hip, right  [facility]      2. Pain in right hip  XR hip/pelv 2-3 vws right if performed    [facility]      3. Trochanteric bursitis of right hip  [facility]      4. Acute right-sided low back pain with right-sided sciatica  [facility]        Orders Placed This Encounter   Procedures    Large joint arthrocentesis    XR hip/pelv 2-3 vws right if performed    [facility]        IMAGING STUDIES: (I personally reviewed images in PACS and report):  X-ray right hip 2/15/2025:  At least moderate osteoarthritis of the right hip with BROOKE      ASSESSMENT/PLAN:  Right hip moderate arthritis with BROOKE  Right hip trochanteric bursitis    Repeat X-ray next visit: None    Return for Follow-up for Ultrasound Guided Injection.    Patient instructions below verbally summarized in person during encounter:  Patient Instructions   Trial corticosteroid junction for lateral hip trochanteric bursitis.  I explained the patient that this may not provide complete relief as she also has evidence of osteoarthritis in the right hip causing radiation of pain down the thigh.  I recommended trial physical therapy for trochanter bursitis and also to return for ultrasound-guided corticosteroid injection to the right hip if she does not have satisfactory relief with trochanteric bursa injection today.  I also explained the patient that if she continues to have pain rating down the leg that is refractory to her current treatment for the hip then we will consider possible pinched nerve in her lumbar spine as source of her pain.      __________________________________________________________________________    HISTORY OF PRESENT ILLNESS:  Evaluation of right lateral hip pain ongoing for 2 months now with no specific injury event.  She traces along the lateral aspect of the thigh as source of her pain.  Intermittently she does have numbness and tingling below the knee and the  "lateral aspect of the calf.  On occasion intermittent back pain but today she points to right thigh as greatest source of her pain.  She describes the pain in the thigh as achy pain.    Denies fevers, history of cancer      Review of Systems      Following history reviewed and update:    Past Medical History:   Diagnosis Date    Anxiety     Arthritis     Disease of thyroid gland     Fatty liver     Hypertension      Past Surgical History:   Procedure Laterality Date    BREAST CYST EXCISION Left 2012    Benign    BREAST LUMPECTOMY      COLONOSCOPY  2023    THYROID SURGERY      Total Thyroidectomy     Social History   Social History     Substance and Sexual Activity   Alcohol Use Yes    Alcohol/week: 14.0 standard drinks of alcohol    Types: 14 Glasses of wine per week    Comment: Social weekly     Social History     Substance and Sexual Activity   Drug Use No     Social History     Tobacco Use   Smoking Status Former    Current packs/day: 0.00    Average packs/day: 1 pack/day for 30.0 years (30.0 ttl pk-yrs)    Types: Cigarettes    Start date: 1967    Quit date: 1997    Years since quittin.1    Passive exposure: Past   Smokeless Tobacco Never     Family History   Problem Relation Age of Onset    Arthritis Mother     Lung cancer Mother 69    Lung cancer Father 69    No Known Problems Daughter     No Known Problems Daughter     Diabetes Maternal Grandmother         Mellitus    No Known Problems Maternal Grandfather     Diabetes Paternal Grandmother         Mellitus    No Known Problems Paternal Grandfather     Lung cancer Brother 70    COPD Brother     Cancer Brother 70        Unknown origin    No Known Problems Paternal Aunt     No Known Problems Paternal Aunt     No Known Problems Paternal Aunt     Breast cancer Neg Hx     Colon cancer Neg Hx     Endometrial cancer Neg Hx     Ovarian cancer Neg Hx      No Known Allergies       Physical Exam  Ht 5' 5\" (1.651 m)   Wt 70.3 kg (155 lb)   LMP  " "(LMP Unknown)   BMI 25.79 kg/m²         Ortho Exam  BACK EXAM:    BACK TENDERNESS:  Spinous Processes: no  Paraspinal Muscles: no    DERMATOMAL SENSATION:  L1: normal   L2: normal   L3: normal   L4: normal   L5: normal   S1: normal    STRENGTH (bilateral):  Knee Extension: 5/5  Foot Dorsiflexion: 5/5  Great Toe Extension: 5/5  Foot Plantarflexion: 5/5  Hip Flexion: 5/5  Hip Abduction: 5/5    BACK:   SUPINE STRAIGHT LEG: +  PRONE STRAIGHT LEG:  SLUMP:     RIGHT HIP:  + Lateral hip tenderness greater trochanter  LOG ROLL: negative  KOLTON: +  FADIR: +    LEFT HIP:  LOG ROLL: negative  KOLTON: negative  FADIR: negative        __________________________________________________________________________  Large joint arthrocentesis: R greater trochanteric bursa  Universal Protocol:  procedure performed by consultantConsent: Verbal consent obtained.  Risks and benefits: risks, benefits and alternatives were discussed  Consent given by: patient  Time out: Immediately prior to procedure a \"time out\" was called to verify the correct patient, procedure, equipment, support staff and site/side marked as required.  Patient understanding: patient states understanding of the procedure being performed  Site marked: the operative site was marked  Patient identity confirmed: verbally with patient  Supporting Documentation  Indications: pain and diagnostic evaluation   Procedure Details  Location: hip - R greater trochanteric bursa  Preparation: Patient was prepped and draped in the usual sterile fashion  Needle size: 22 G  Ultrasound guidance: no  Approach: lateral  Medications administered: 9 mL lidocaine 1 %; 40 mg triamcinolone acetonide 40 mg/mL    Patient tolerance: patient tolerated the procedure well with no immediate complications  Dressing:  Sterile dressing applied              FAUSTINO Catalan present for encounter.           "

## 2025-02-23 DIAGNOSIS — F41.9 ANXIETY: ICD-10-CM

## 2025-02-24 RX ORDER — LORAZEPAM 0.5 MG/1
0.5 TABLET ORAL DAILY PRN
Qty: 30 TABLET | Refills: 0 | Status: SHIPPED | OUTPATIENT
Start: 2025-02-24

## 2025-02-25 ENCOUNTER — OFFICE VISIT (OUTPATIENT)
Dept: GASTROENTEROLOGY | Facility: CLINIC | Age: 70
End: 2025-02-25
Payer: MEDICARE

## 2025-02-25 VITALS
SYSTOLIC BLOOD PRESSURE: 118 MMHG | DIASTOLIC BLOOD PRESSURE: 64 MMHG | BODY MASS INDEX: 25.33 KG/M2 | HEIGHT: 65 IN | WEIGHT: 152 LBS

## 2025-02-25 DIAGNOSIS — K64.8 INTERNAL HEMORRHOID: Primary | ICD-10-CM

## 2025-02-25 PROCEDURE — 46221 LIGATION OF HEMORRHOID(S): CPT | Performed by: STUDENT IN AN ORGANIZED HEALTH CARE EDUCATION/TRAINING PROGRAM

## 2025-02-25 NOTE — PROGRESS NOTES
[facility] - Hemorrhoid Banding Melba Busby 69 y.o. female MRN: [id number]  Encounter: [id number]    ASSESSMENT AND PLAN:    The left lateral hemorrhoid area was banded 2/25/2025. The patient was instructed to avoid constipation and straining, and educated in appropriate fiber intake.     HPI: Melba Busby is a 69 y.o. year old female who presents with rectal bleeding due to hemorrhoids.     Previous treatments include: banding  Bands were previously placed: right anterior, right posterior   Current Fiber intake: encouraged supplementation  Complications of prior therapy include: none    The patient provided consent for banding  and was placed in the left lateral position  Rectal exam showed Grade III hemorrhoids  The [place] ligator was advanced and a band was applied without difficulty   Repeat rectal exam confirmed appropriate placement  The patient was discharged home after observation in the waiting area  The exam was chaperoned by nursing staff

## 2025-03-10 ENCOUNTER — TELEPHONE (OUTPATIENT)
Age: 70
End: 2025-03-10

## 2025-03-10 NOTE — TELEPHONE ENCOUNTER
Patient called to schedule a same day appointment. She states she's been coughing for over a month. She also mentioned bleeding 10 days ago for about 4 days and then it followed with cramping which she thinks it might be stones. She mentioned random back pain that comes and goes. She requested an appointment with Dr Tesfaye, explained to her the schedule was pretty full for her today and she requested a message be sent to the doctor for suggestions on what she should do next. Please advise.

## 2025-03-11 ENCOUNTER — APPOINTMENT (OUTPATIENT)
Dept: RADIOLOGY | Facility: CLINIC | Age: 70
End: 2025-03-11
Payer: MEDICARE

## 2025-03-11 ENCOUNTER — RESULTS FOLLOW-UP (OUTPATIENT)
Dept: FAMILY MEDICINE CLINIC | Facility: CLINIC | Age: 70
End: 2025-03-11

## 2025-03-11 ENCOUNTER — TELEPHONE (OUTPATIENT)
Age: 70
End: 2025-03-11

## 2025-03-11 ENCOUNTER — OFFICE VISIT (OUTPATIENT)
Dept: FAMILY MEDICINE CLINIC | Facility: CLINIC | Age: 70
End: 2025-03-11
Payer: MEDICARE

## 2025-03-11 VITALS
RESPIRATION RATE: 18 BRPM | SYSTOLIC BLOOD PRESSURE: 118 MMHG | DIASTOLIC BLOOD PRESSURE: 72 MMHG | TEMPERATURE: 97.2 F | HEART RATE: 63 BPM | HEIGHT: 65 IN | BODY MASS INDEX: 25.29 KG/M2 | WEIGHT: 151.8 LBS | OXYGEN SATURATION: 98 %

## 2025-03-11 DIAGNOSIS — N95.0 POST-MENOPAUSE BLEEDING: ICD-10-CM

## 2025-03-11 DIAGNOSIS — R05.2 SUBACUTE COUGH: Primary | ICD-10-CM

## 2025-03-11 DIAGNOSIS — R05.2 SUBACUTE COUGH: ICD-10-CM

## 2025-03-11 DIAGNOSIS — B37.9 ANTIBIOTIC-INDUCED YEAST INFECTION: ICD-10-CM

## 2025-03-11 DIAGNOSIS — Z12.31 ENCOUNTER FOR SCREENING MAMMOGRAM FOR BREAST CANCER: ICD-10-CM

## 2025-03-11 DIAGNOSIS — T36.95XA ANTIBIOTIC-INDUCED YEAST INFECTION: ICD-10-CM

## 2025-03-11 PROCEDURE — 71046 X-RAY EXAM CHEST 2 VIEWS: CPT

## 2025-03-11 PROCEDURE — G2211 COMPLEX E/M VISIT ADD ON: HCPCS | Performed by: FAMILY MEDICINE

## 2025-03-11 PROCEDURE — 99214 OFFICE O/P EST MOD 30 MIN: CPT | Performed by: FAMILY MEDICINE

## 2025-03-11 RX ORDER — FLUCONAZOLE 150 MG/1
150 TABLET ORAL ONCE
Qty: 1 TABLET | Refills: 0 | Status: SHIPPED | OUTPATIENT
Start: 2025-03-11 | End: 2025-03-11

## 2025-03-11 RX ORDER — FLUTICASONE PROPIONATE AND SALMETEROL 250; 50 UG/1; UG/1
1 POWDER RESPIRATORY (INHALATION) 2 TIMES DAILY
Qty: 60 BLISTER | Refills: 5 | Status: SHIPPED | OUTPATIENT
Start: 2025-03-11 | End: 2025-09-07

## 2025-03-11 RX ORDER — BUDESONIDE AND FORMOTEROL FUMARATE DIHYDRATE 160; 4.5 UG/1; UG/1
2 AEROSOL RESPIRATORY (INHALATION) 2 TIMES DAILY
Qty: 10.2 G | Refills: 5 | Status: SHIPPED | OUTPATIENT
Start: 2025-03-11 | End: 2025-03-11 | Stop reason: ALTCHOICE

## 2025-03-11 NOTE — TELEPHONE ENCOUNTER
Melba stated the Advair was also denied by her insurance; pharmacy provided suggestion for Brio inhaler.    Please contact to advise once alt rx sent to pharmacy

## 2025-03-11 NOTE — PROGRESS NOTES
Melba Busby 1955 female MRN: [id number]    Family Medicine Acute Visit    ASSESSMENT/PLAN  Problem List Items Addressed This Visit    None  Visit Diagnoses         Subacute cough    -  Primary    Relevant Medications    budesonide-formoterol (SYMBICORT) 160-4.5 mcg/act inhaler    Other Relevant Orders    XR chest pa and lateral      Post-menopause bleeding        Relevant Orders    US pelvis complete w transvaginal    Ambulatory Referral to Obstetrics / Gynecology      Antibiotic-induced yeast infection        Relevant Medications    fluconazole (DIFLUCAN) 150 mg tablet      Encounter for screening mammogram for breast cancer        Relevant Orders    Mammo screening bilateral w 3d and cad            Suspect prolong post viral cough   Will check CXR r.u pneumonia  Trial of Symbicort for airway inflammation  Will check pelvic u/s for bleeding. Her urine was negative for infection from urgent care  Follow up with gyn for additional evaluation       Future Appointments   Date Time Provider Department Center   3/20/2025  6:15 PM Art Sher, PT [facility]   3/24/2025  6:15 PM Gen Dimas, PT [facility]   3/27/2025  6:15 PM Art Sher, PT [facility]   3/31/2025  6:15 PM Gen Dimas, PT [facility]   10/23/2025  9:45 AM DO NIKOS Cerna  [facility]          SUBJECTIVE  CC: Follow-up (From urgent care-still having a bad cough for 1 month ) and Vaginal Bleeding (For 4 days but has now stopped )      HPI:  Melba Busby is a 69 y.o. female who presents for sick visit  Around her grandkids who were sick  She was at urgent care 2 times  She has had cough for a month now, no mucous that she can get out  At  urgent care given steroids and augmentin  Also had an episode where she thought she had a UTI- urine was clear but she had vaginal bleeding       Review of Systems   Constitutional:  Positive for fatigue. Negative for chills and  fever.   HENT:  Negative for congestion, postnasal drip, rhinorrhea and sinus pressure.    Eyes:  Negative for photophobia and visual disturbance.   Respiratory:  Positive for cough. Negative for shortness of breath.    Cardiovascular:  Negative for chest pain, palpitations and leg swelling.   Gastrointestinal:  Negative for abdominal pain, constipation, diarrhea, nausea and vomiting.   Genitourinary:  Positive for vaginal bleeding. Negative for difficulty urinating and dysuria.   Musculoskeletal:  Negative for arthralgias and myalgias.   Skin:  Negative for color change and rash.   Neurological:  Negative for dizziness, weakness, light-headedness and headaches.       Historical Information   The patient history was reviewed as follows:  Past Medical History:   Diagnosis Date    Anxiety     Arthritis     Disease of thyroid gland     Fatty liver 2023    Hypertension          Past Surgical History:   Procedure Laterality Date    BREAST CYST EXCISION Left 2012    Benign    BREAST LUMPECTOMY      COLONOSCOPY  08/14/2023    THYROID SURGERY      Total Thyroidectomy     Family History   Problem Relation Age of Onset    Arthritis Mother     Lung cancer Mother 69    Lung cancer Father 69    No Known Problems Daughter     No Known Problems Daughter     Diabetes Maternal Grandmother         Mellitus    No Known Problems Maternal Grandfather     Diabetes Paternal Grandmother         Mellitus    No Known Problems Paternal Grandfather     Lung cancer Brother 70    COPD Brother     Cancer Brother 70        Unknown origin    No Known Problems Paternal Aunt     No Known Problems Paternal Aunt     No Known Problems Paternal Aunt     Breast cancer Neg Hx     Colon cancer Neg Hx     Endometrial cancer Neg Hx     Ovarian cancer Neg Hx       Social History   Social History     Substance and Sexual Activity   Alcohol Use Yes    Alcohol/week: 14.0 standard drinks of alcohol    Types: 14 Glasses of wine per week    Comment: Social weekly      Social History     Substance and Sexual Activity   Drug Use No     Social History     Tobacco Use   Smoking Status Former    Current packs/day: 0.00    Average packs/day: 1 pack/day for 30.0 years (30.0 ttl pk-yrs)    Types: Cigarettes    Start date: 1967    Quit date: 1997    Years since quittin.2    Passive exposure: Past   Smokeless Tobacco Never       Medications:     Current Outpatient Medications:     atenolol (TENORMIN) 25 mg tablet, TAKE 1 TABLET (25 MG TOTAL) BY MOUTH DAILY AT 8 PM, Disp: 90 tablet, Rfl: 1    budesonide-formoterol (SYMBICORT) 160-4.5 mcg/act inhaler, Inhale 2 puffs 2 (two) times a day Rinse mouth after use., Disp: 10.2 g, Rfl: 5    fluconazole (DIFLUCAN) 150 mg tablet, Take 1 tablet (150 mg total) by mouth once for 1 dose, Disp: 1 tablet, Rfl: 0    ibuprofen (MOTRIN) 200 mg tablet, every 6 (six) hours., Disp: , Rfl:     levothyroxine 100 mcg tablet, Take 100 mcg by mouth daily, Disp: , Rfl:     LORazepam (ATIVAN) 0.5 mg tablet, Take 1 tablet (0.5 mg total) by mouth daily as needed for anxiety, Disp: 30 tablet, Rfl: 0    loteprednol etabonate (LOTEMAX) 0.5 % ophthalmic suspension, INSTILL 1 DROP INTO EACH EYE 3X/DAY FOR 2 WEEKS THEN TAPER TO 2X/DAY FOR AN ADDITIONAL 2 WEEK S, Disp: , Rfl:     hydrocortisone (ANUSOL-HC) 25 mg suppository, Insert 1 suppository (25 mg total) into the rectum 2 (two) times a day for 5 days, Disp: 10 suppository, Rfl: 0    methocarbamol (ROBAXIN) 500 mg tablet, Take 1 tablet (500 mg total) by mouth 4 (four) times a day for 14 days, Disp: 56 tablet, Rfl: 0    pantoprazole (PROTONIX) 40 mg tablet, TAKE 1 TABLET BY MOUTH DAILY BEFORE BREAKFAST (Patient not taking: Reported on 10/18/2024), Disp: 90 tablet, Rfl: 1    psyllium (METAMUCIL) 58.6 % packet, Take 1 packet by mouth daily, Disp: 30 packet, Rfl: 0    No Known Allergies    OBJECTIVE  Vitals:   Vitals:    25 0959   BP: 118/72   BP Location: Left arm   Patient Position: Sitting   Cuff Size:  "Standard   Pulse: 63   Resp: 18   Temp: (!) 97.2 °F (36.2 °C)   TempSrc: Tympanic   SpO2: 98%   Weight: 68.9 kg (151 lb 12.8 oz)   Height: 5' 5\" (1.651 m)         Physical Exam  Constitutional:       Appearance: She is well-developed.   HENT:      Head: Normocephalic and atraumatic.   Cardiovascular:      Rate and Rhythm: Normal rate and regular rhythm.      Heart sounds: Normal heart sounds.   Pulmonary:      Effort: Pulmonary effort is normal. No respiratory distress.      Breath sounds: Rhonchi present. No wheezing.   Musculoskeletal:         General: No tenderness. Normal range of motion.      Cervical back: Normal range of motion and neck supple.   Skin:     General: Skin is warm and dry.   Neurological:      Mental Status: She is alert and oriented to person, place, and time.   Psychiatric:         Behavior: Behavior normal.                    [place] Tesfaye, DO    3/11/2025      "

## 2025-03-11 NOTE — TELEPHONE ENCOUNTER
Patient called stating the budesonide-formoterol (SYMBICORT) 160-4.5 mcg/act inhaler was denied from her insurance company.  She said Dr. Tesfaye would give her an alternative medication and asked if that could be called in today.  She would also like to go over her recent chest xray results.  Please advise.

## 2025-03-12 ENCOUNTER — HOSPITAL ENCOUNTER (OUTPATIENT)
Dept: ULTRASOUND IMAGING | Facility: HOSPITAL | Age: 70
Discharge: HOME | End: 2025-03-12
Payer: MEDICARE

## 2025-03-12 DIAGNOSIS — N95.0 POST-MENOPAUSE BLEEDING: ICD-10-CM

## 2025-03-12 DIAGNOSIS — R05.2 SUBACUTE COUGH: Primary | ICD-10-CM

## 2025-03-12 PROCEDURE — 76856 US EXAM PELVIC COMPLETE: CPT

## 2025-03-12 PROCEDURE — 76830 TRANSVAGINAL US NON-OB: CPT

## 2025-03-12 RX ORDER — FLUTICASONE PROPIONATE AND SALMETEROL XINAFOATE 45; 21 UG/1; UG/1
AEROSOL, METERED RESPIRATORY (INHALATION)
Refills: 0 | OUTPATIENT
Start: 2025-03-12

## 2025-03-12 RX ORDER — FLUTICASONE FUROATE AND VILANTEROL 200; 25 UG/1; UG/1
1 POWDER RESPIRATORY (INHALATION) DAILY
Qty: 60 BLISTER | Refills: 1 | Status: SHIPPED | OUTPATIENT
Start: 2025-03-12

## 2025-03-13 ENCOUNTER — PROCEDURE VISIT (OUTPATIENT)
Age: 70
End: 2025-03-13
Payer: MEDICARE

## 2025-03-13 VITALS
SYSTOLIC BLOOD PRESSURE: 138 MMHG | WEIGHT: 152 LBS | BODY MASS INDEX: 25.33 KG/M2 | DIASTOLIC BLOOD PRESSURE: 84 MMHG | HEIGHT: 65 IN

## 2025-03-13 DIAGNOSIS — N95.2 VAGINAL ATROPHY: ICD-10-CM

## 2025-03-13 DIAGNOSIS — N95.0 POST-MENOPAUSE BLEEDING: ICD-10-CM

## 2025-03-13 DIAGNOSIS — N84.1 POLYP AT CERVICAL OS: Primary | ICD-10-CM

## 2025-03-13 DIAGNOSIS — R05.2 SUBACUTE COUGH: Primary | ICD-10-CM

## 2025-03-13 PROCEDURE — 17250 CHEM CAUT OF GRANLTJ TISSUE: CPT | Performed by: OBSTETRICS & GYNECOLOGY

## 2025-03-13 PROCEDURE — 99213 OFFICE O/P EST LOW 20 MIN: CPT | Performed by: OBSTETRICS & GYNECOLOGY

## 2025-03-13 RX ORDER — ESTRADIOL 0.1 MG/G
1 CREAM VAGINAL 2 TIMES WEEKLY
Qty: 42.5 G | Refills: 3 | Status: SHIPPED | OUTPATIENT
Start: 2025-03-13

## 2025-03-13 RX ORDER — PREDNISONE 10 MG/1
TABLET ORAL
Qty: 21 TABLET | Refills: 0 | Status: SHIPPED | OUTPATIENT
Start: 2025-03-13

## 2025-03-14 NOTE — PROGRESS NOTES
GYN Problem Visit    HPI:Patient is a 69 postmenopausal female who presents with c/o VB x 4 days.  She has been dealing with significant cough due to bronchitis for over one month.  She has just started oral steroid and is starting to feel better.  Bleeding light.  Has had some lower pelvic and back cramping.  Pelvic sono done.  Bleeding has resolved.  No urinary complaints - urine done at Urgent Care recently negative.  Notes vaginal dryness and painful IC for months.      PMH, PSH, Meds, Allergies, SocHx, FamHx reviewed and no changes noted.    Review of Systems   Constitutional: Negative for chills, diaphoresis, fever and malaise/fatigue.   Respiratory:  Positive for cough. Negative for shortness of breath, sputum production and wheezing.    Gastrointestinal:  Positive for hemorrhoids. Negative for abdominal pain, change in bowel habit and vomiting.   Genitourinary:  Positive for non-menstrual bleeding. Negative for genital sores, hematuria and pelvic pain.     Vitals:    03/13/25 1538   BP: 138/84       Physical Exam  Constitutional:       Appearance: Normal appearance.   Genitourinary:      Vulva and urethral meatus normal.      No inguinal adenopathy present in the right or left side.     No vaginal discharge, bleeding or ulceration.      Moderate vaginal atrophy present.     Cervical polyp present.      No cervical discharge or friability.   HENT:      Head: Normocephalic.   Cardiovascular:      Rate and Rhythm: Normal rate and regular rhythm.   Pulmonary:      Effort: Pulmonary effort is normal.   Abdominal:      Palpations: Abdomen is soft.      Tenderness: There is no abdominal tenderness.   Musculoskeletal:         General: No swelling.   Lymphadenopathy:      Lower Body: No right inguinal adenopathy. No left inguinal adenopathy.   Neurological:      General: No focal deficit present.      Mental Status: She is alert and oriented to person, place, and time.   Skin:     General: Skin is warm and dry.    Psychiatric:         Mood and Affect: Mood normal.         Behavior: Behavior normal.   Vitals reviewed.    3/12/2025 Pelvic Sono pending; I personally reviewed images.  Two small (1.5 cm) intramural fibroids and 4 mm endometrium    Cervical Procedure    Date/Time: 3/13/2025 3:30 PM    Performed by: Renetta Mims MD  Authorized by: Renetta Mims MD    Other Assisting Provider: No    Verbal consent obtained?: Yes    Risks and benefits: Risks, benefits and alternatives were discussed    Consent given by:  Patient  Patient states understanding of procedure being performed: Yes    Patient identity confirmed:  Verbally with patient  Procedure:     Procedure comment:  Chemical cauterization of polyp    [place] speculum was placed in the vagina: yes      Silver nitrate was applied: yes      Specimen(s) to pathology: no    Post-procedure:     Findings: Polyps      Polyp Location:  Posterior cervix    Patient tolerance of procedure:  Tolerated well, no immediate complications  Comments:      Posterior polyp noted - friable and not amenable to extraction.      Impression/Plan:    1. Post-menopause bleeding    - Ambulatory Referral to Obstetrics / Gynecology    2. Vaginal atrophy    - estradiol (ESTRACE VAGINAL) 0.1 mg/g vaginal cream; Insert 1 g into the vagina 2 (two) times a week  Dispense: 42.5 g; Refill: 3    3. Polyp at cervical os (Primary)    - reassured patient; if bleeding recurs RTO

## 2025-03-20 ENCOUNTER — EVALUATION (OUTPATIENT)
Dept: PHYSICAL THERAPY | Facility: CLINIC | Age: 70
End: 2025-03-20
Payer: MEDICARE

## 2025-03-20 DIAGNOSIS — M54.41 RIGHT-SIDED LOW BACK PAIN WITH RIGHT-SIDED SCIATICA, UNSPECIFIED CHRONICITY: ICD-10-CM

## 2025-03-20 DIAGNOSIS — M25.551 RIGHT HIP PAIN: Primary | ICD-10-CM

## 2025-03-20 PROCEDURE — 97110 THERAPEUTIC EXERCISES: CPT | Performed by: PHYSICAL THERAPIST

## 2025-03-20 PROCEDURE — 97161 PT EVAL LOW COMPLEX 20 MIN: CPT | Performed by: PHYSICAL THERAPIST

## 2025-03-20 NOTE — PROGRESS NOTES
PT Evaluation     Today's date: 3/20/2025  Patient name: Melba Busby  : 1955  MRN: [id number]  Referring provider: Kana Morrell  Dx:   Encounter Diagnosis     ICD-10-CM    1. Right hip pain  M25.551       2. Right-sided low back pain with right-sided sciatica, unspecified chronicity  M54.41                      Assessment  Impairments: abnormal or restricted ROM, impaired physical strength, lacks appropriate home exercise program and pain with function    Assessment details: Melba Busby is a 69 y.o. female who presents with pain, decreased strength, and decreased ROM.  Due to these impairments, patient has difficulty performing a/iadls and recreational activities.  Patient's clinical presentation is consistent with their referring diagnoses of right hip pain and low back pain with right-sided sciatica; patient currently demonstrates an extension preference of the L/S.  Patient would benefit from skilled physical therapy to address their aforementioned impairments, improve their level of function and to improve their overall quality of life.     Goals  Short term goals - to be achieved in 4 weeks:    Decrease pain 20-50%.   Increase strength by 1/2 grade.   Improve L/S ROM by 25%.  Long term goals - to be achieved by discharge:    Performance in related household activities is improved to maximal level of function.    IADL performance in related activities is improved to maximal level of function.    Performance in related recreational activities is improved to maximal level of function.       Plan    Planned therapy interventions: manual therapy, neuromuscular re-education, patient/caregiver education, postural training, strengthening, stretching, therapeutic activities, therapeutic exercise and home exercise program    Frequency: 1-2x week  Duration in weeks: 6  Plan of Care beginning date: 3/20/2025  Plan of Care expiration date: 2025  Treatment plan discussed with:  patient        Subjective Evaluation    History of Present Illness  Mechanism of injury: Patient refers to PT with c/o pain in her right hip and low back which began approximately six months previous of insidious onset.  X-rays of the pelvis and hips taken on 2/15/25 revealed moderate right hip osteoarthritis; mild left hip osteoarthritic degenerative changes; degenerative changes in the visualized lower L/S.  Patient states intermittent radicular symptoms in her right LE to the level of the lower leg.  Patient denies changes in bowel or bladder.  Patient is retired.    Pain  Current pain ratin  At best pain ratin  At worst pain ratin          Objective     Neurological Testing     Sensation     Lumbar   Left   Intact: light touch    Right   Intact: light touch    Active Range of Motion     Lumbar   Flexion:  WFL  Extension:  Restriction level: moderate  Left lateral flexion:  Restriction level: moderate  Right lateral flexion:  Restriction level: moderate  Left Hip   Normal active range of motion    Right Hip   Normal active range of motion    Strength/Myotome Testing     Left Hip   Planes of Motion   Flexion: 4-  Extension: 4  Abduction: 4  Adduction: 4    Right Hip   Planes of Motion   Flexion: 3+  Extension: 4-  Abduction: 3+  Adduction: 4-    Left Knee   Flexion: 4+  Extension: 4+    Right Knee   Flexion: 4+  Extension: 4+    Left Ankle/Foot   Dorsiflexion: 4+  Plantar flexion: 4+    Right Ankle/Foot   Dorsiflexion: 4+  Plantar flexion: 4+    General Comments:      Lumbar Comments  Repeated flexion in standing: Produced bilateral LE radicular symptoms, worse  Repeated extension in standing: Abolished symptoms, better            Daily Treatment Diary     Precautions: None  CO-MORBIDITIES:HTN, GERD, Anxiety  HEP ACCESS CODE:   FOTO Completed On:     POC Expires Reeval for Medicare to be completed  Unit Limit Auth Expiration Date PT/OT/STVisit Limit   25 By visit 10 N/A  BOMN    Completed on visit                     Auth Status DATE 3/20         NA Visit #          Remaining         MANUAL THERAPY                                                               THERAPEUTIC EXERCISE HEP         Bike or TM          L/S ext in standing HEP         SLR flex HEP         SLR abd HEP         TB rows          TB lat pull downs          Leg press                                                                                          NEUROMUSCULAR REEDUCATION           Sup TA activation HEP         Sup TA with marches HEP         Sup TA with alt UE/LE          Bridges          Clamshells          Reverse clamshells                                                                                          THERAPEUTIC ACTIVITY                                                  GAIT TRAINING                                                  MODALITIES

## 2025-03-24 ENCOUNTER — OFFICE VISIT (OUTPATIENT)
Dept: PHYSICAL THERAPY | Facility: CLINIC | Age: 70
End: 2025-03-24
Payer: MEDICARE

## 2025-03-24 DIAGNOSIS — M25.551 RIGHT HIP PAIN: Primary | ICD-10-CM

## 2025-03-24 DIAGNOSIS — M54.41 RIGHT-SIDED LOW BACK PAIN WITH RIGHT-SIDED SCIATICA, UNSPECIFIED CHRONICITY: ICD-10-CM

## 2025-03-24 PROCEDURE — 97530 THERAPEUTIC ACTIVITIES: CPT

## 2025-03-24 PROCEDURE — 97112 NEUROMUSCULAR REEDUCATION: CPT

## 2025-03-24 PROCEDURE — 97110 THERAPEUTIC EXERCISES: CPT

## 2025-03-24 NOTE — PROGRESS NOTES
"Daily Note     Today's date: 3/24/2025  Patient name: Melba Busby  : 1955  MRN: [id number]  Referring provider: Kana Morrell  Dx:   Encounter Diagnosis     ICD-10-CM    1. Right hip pain  M25.551       2. Right-sided low back pain with right-sided sciatica, unspecified chronicity  M54.41                      Subjective: Pt reports she is unsure if she will return to PT, due to her daughter in [place] is going into labor anytime soon and will be receiving a call anytime now.       Objective: See treatment diary below      Assessment: Pt performed below TE with fair/good tolerance. Encourage pt to perform  PPT and apply MHP to LB as needed.       Plan: Continue per plan of care.      Daily Treatment Diary     Precautions: None  CO-MORBIDITIES:HTN, GERD, Anxiety  HEP ACCESS CODE:   FOTO Completed On:     POC Expires Reeval for Medicare to be completed  Unit Limit Auth Expiration Date PT/OT/STVisit Limit   25 By visit 10 N/A  BOMN    Completed on visit                    Auth Status DATE 3/20 3/24        NA Visit #          Remaining         MANUAL THERAPY                                                               THERAPEUTIC EXERCISE HEP         Bike   L1 5'        L/S ext in standing HEP         SLR flex HEP 2x10        SLR abd HEP         TB rows          TB lat pull downs          Leg press                                                                                          NEUROMUSCULAR REEDUCATION           Sup TA activation HEP 5\" 20        Sup TA with marches HEP 20x        Sup TA with alt UE/LE          Bridges  20x        Clamshells          Reverse clamshells          LTR L/R  20\" 3 ea        Supine Ball squeeze  5\" 20        Supine TB ABD  BTB 5\" 20        Seated HS stretch  20\" 3 ea        Seated piriformis stretch   20\" 3 ea        Pball stretch fwd/side  10\"  3ea                            THERAPEUTIC ACTIVITY                                                  GAIT TRAINING    "                                               MODALITIES

## 2025-03-27 ENCOUNTER — APPOINTMENT (OUTPATIENT)
Dept: PHYSICAL THERAPY | Facility: CLINIC | Age: 70
End: 2025-03-27
Payer: MEDICARE

## 2025-03-31 ENCOUNTER — APPOINTMENT (OUTPATIENT)
Dept: PHYSICAL THERAPY | Facility: CLINIC | Age: 70
End: 2025-03-31
Payer: MEDICARE

## 2025-04-12 DIAGNOSIS — F41.9 ANXIETY: ICD-10-CM

## 2025-04-14 RX ORDER — LORAZEPAM 0.5 MG/1
0.5 TABLET ORAL DAILY PRN
Qty: 30 TABLET | Refills: 0 | Status: SHIPPED | OUTPATIENT
Start: 2025-04-14

## 2025-04-17 ENCOUNTER — OFFICE VISIT (OUTPATIENT)
Dept: OBGYN CLINIC | Facility: OTHER | Age: 70
End: 2025-04-17
Payer: MEDICARE

## 2025-04-17 VITALS — HEIGHT: 65 IN | WEIGHT: 152 LBS | BODY MASS INDEX: 25.33 KG/M2

## 2025-04-17 DIAGNOSIS — M16.11 PRIMARY OSTEOARTHRITIS OF ONE HIP, RIGHT: Primary | ICD-10-CM

## 2025-04-17 PROCEDURE — 99213 OFFICE O/P EST LOW 20 MIN: CPT | Performed by: FAMILY MEDICINE

## 2025-04-17 PROCEDURE — 20611 DRAIN/INJ JOINT/BURSA W/US: CPT | Performed by: FAMILY MEDICINE

## 2025-04-17 RX ORDER — LIDOCAINE HYDROCHLORIDE 10 MG/ML
8 INJECTION, SOLUTION INFILTRATION; PERINEURAL
Status: COMPLETED | OUTPATIENT
Start: 2025-04-17 | End: 2025-04-17

## 2025-04-17 RX ORDER — TRIAMCINOLONE ACETONIDE 40 MG/ML
80 INJECTION, SUSPENSION INTRA-ARTICULAR; INTRAMUSCULAR
Status: COMPLETED | OUTPATIENT
Start: 2025-04-17 | End: 2025-04-17

## 2025-04-17 RX ORDER — LIDOCAINE HYDROCHLORIDE 10 MG/ML
10 INJECTION, SOLUTION INFILTRATION; PERINEURAL
Status: COMPLETED | OUTPATIENT
Start: 2025-04-17 | End: 2025-04-17

## 2025-04-17 RX ADMIN — LIDOCAINE HYDROCHLORIDE 10 ML: 10 INJECTION, SOLUTION INFILTRATION; PERINEURAL at 14:30

## 2025-04-17 RX ADMIN — TRIAMCINOLONE ACETONIDE 80 MG: 40 INJECTION, SUSPENSION INTRA-ARTICULAR; INTRAMUSCULAR at 14:30

## 2025-04-17 RX ADMIN — LIDOCAINE HYDROCHLORIDE 8 ML: 10 INJECTION, SOLUTION INFILTRATION; PERINEURAL at 14:30

## 2025-04-17 NOTE — PATIENT INSTRUCTIONS
Trial right hip USG intra-articular corticosteroid injection into the joint  I explained the patient that on examination today she does have signs of osteoarthritic flare of the right hip with pain on hip maneuvers including logroll KOLTON FADIR testing.

## 2025-04-17 NOTE — PROGRESS NOTES
1. Primary osteoarthritis of one hip, right  Large joint arthrocentesis: R hip joint          Orders Placed This Encounter   Procedures    Large joint arthrocentesis: R hip joint        ASSESSMENT/PLAN:  Right hip moderate arthritis with BROOKE  Right hip trochanteric bursitis    Repeat X-ray next visit: None    Return for Follow-up for Ultrasound Guided Injection, Follow-up every 3 months as needed for injection.    Patient instructions below verbally summarized in person during encounter:  Patient Instructions   Trial right hip USG intra-articular corticosteroid injection into the joint  I explained the patient that on examination today she does have signs of osteoarthritic flare of the right hip with pain on hip maneuvers including logroll KOLTON FADIR testing.      _______________________________________________________________________    IMAGING STUDIES: (I personally reviewed images in PACS and report):  X-ray right hip 2/15/2025:  At least moderate osteoarthritis of the right hip with BROOKE  __    HISTORY OF PRESENT ILLNESS:    2/15/25:  Evaluation of right lateral hip pain ongoing for 2 months now with no specific injury event.  She traces along the lateral aspect of the thigh as source of her pain.  Intermittently she does have numbness and tingling below the knee and the lateral aspect of the calf.  On occasion intermittent back pain but today she points to right thigh as greatest source of her pain.  She describes the pain in the thigh as achy pain.    Denies fevers, history of cancer    4/17/25:  F/u right hip pain.  Patient did have lateral greater trochanteric bursa injection [place] guided done last visit which resulted in almost complete relief of patient's pain.  She was slated for follow-up to have ultrasound-guided injection performed send she does have a history of osteoarthritis of the right hip however she had to unfortunately cancel due to some other health issues including bleeding and  "fibroids.    Today, she points to anterior lateral aspect of the hip as was her pain including the groin radiating into the thigh.        Review of Systems      Following history reviewed and update:    Past Medical History:   Diagnosis Date    Anxiety     Arthritis     Disease of thyroid gland     Fatty liver     Hypertension      Past Surgical History:   Procedure Laterality Date    BREAST CYST EXCISION Left 2012    Benign    BREAST LUMPECTOMY      COLONOSCOPY  2023    THYROID SURGERY      Total Thyroidectomy     Social History   Social History     Substance and Sexual Activity   Alcohol Use Yes    Alcohol/week: 14.0 standard drinks of alcohol    Types: 14 Glasses of wine per week    Comment: Social weekly     Social History     Substance and Sexual Activity   Drug Use No     Social History     Tobacco Use   Smoking Status Former    Current packs/day: 0.00    Average packs/day: 1 pack/day for 30.0 years (30.0 ttl pk-yrs)    Types: Cigarettes    Start date: 1967    Quit date: 1997    Years since quittin.3    Passive exposure: Past   Smokeless Tobacco Never     Family History   Problem Relation Age of Onset    Arthritis Mother     Lung cancer Mother 69    Lung cancer Father 69    No Known Problems Daughter     No Known Problems Daughter     Diabetes Maternal Grandmother         Mellitus    No Known Problems Maternal Grandfather     Diabetes Paternal Grandmother         Mellitus    No Known Problems Paternal Grandfather     Lung cancer Brother 70    COPD Brother     Cancer Brother 70        Unknown origin    No Known Problems Paternal Aunt     No Known Problems Paternal Aunt     No Known Problems Paternal Aunt     Breast cancer Neg Hx     Colon cancer Neg Hx     Endometrial cancer Neg Hx     Ovarian cancer Neg Hx      No Known Allergies       Physical Exam  Ht 5' 5\" (1.651 m)   Wt 68.9 kg (152 lb)   LMP  (LMP Unknown)   BMI 25.29 kg/m²         Ortho Exam  Right hip exam:  No swelling " "erythema or increased warmth  Positive kendrick, ISSA HI all reproduce pain within the groin rating into the thigh      __________________________________________________________________________  Large joint arthrocentesis: R hip joint  Universal Protocol:  Procedure performed by: (Dr. Enrique Cano)  Consent: Verbal consent obtained.  Risks and benefits: risks, benefits and alternatives were discussed  Consent given by: patient  Time out: Immediately prior to procedure a \"time out\" was called to verify the correct patient, procedure, equipment, support staff and site/side marked as required.  Patient understanding: patient states understanding of the procedure being performed  Site marked: the operative site was marked  Patient identity confirmed: verbally with patient  Supporting Documentation  Indications: pain and diagnostic evaluation   Procedure Details  Location: hip - R hip joint  Preparation: Patient was prepped and draped in the usual sterile fashion  Needle size: 22 G  Ultrasound guidance: yes  Approach: anterior  Medications administered: 8 mL lidocaine 1 %; 80 mg triamcinolone acetonide 40 mg/mL; 10 mL lidocaine 1 %    Aspirate amount: 0 mL  Patient tolerance: patient tolerated the procedure well with no immediate complications  Dressing:  Sterile dressing applied                    "

## 2025-05-06 ENCOUNTER — PROCEDURE VISIT (OUTPATIENT)
Age: 70
End: 2025-05-06

## 2025-05-06 VITALS
DIASTOLIC BLOOD PRESSURE: 64 MMHG | BODY MASS INDEX: 25.43 KG/M2 | WEIGHT: 152.6 LBS | HEIGHT: 65 IN | SYSTOLIC BLOOD PRESSURE: 102 MMHG

## 2025-05-06 DIAGNOSIS — N95.0 POSTMENOPAUSAL BLEEDING: Primary | ICD-10-CM

## 2025-05-06 PROCEDURE — 88305 TISSUE EXAM BY PATHOLOGIST: CPT | Performed by: SPECIALIST

## 2025-05-06 NOTE — PROGRESS NOTES
"Endometrial biopsy    Date/Time: 5/6/2025 10:30 AM    Performed by: Renetta Mims MD  Authorized by: Renetta Mims MD  [place] Protocol:  procedure performed by consultantConsent: Verbal consent obtained.  Risks and benefits: risks, benefits and alternatives were discussed  Consent given by: patient  Time out: Immediately prior to procedure a \"time out\" was called to verify the correct patient, procedure, equipment, support staff and site/side marked as required.  Patient understanding: patient states understanding of the procedure being performed  Required items: required blood products, implants, devices, and special equipment available  Patient identity confirmed: verbally with patient    Indication:     Indications: Post-menopausal bleeding      Chronicity of post-menopausal bleeding:  Recurrent    Progression of post-menopausal bleeding:  Resolved  Pre-procedure:     Premeds:  Ibuprofen    Negative urine pregnancy test: postmenopausal.    Procedure:     Procedure: endometrial biopsy with [place]      A bivalve speculum was placed in the vagina: yes      Cervix cleaned and prepped: yes      The cervix was dilated: no      Uterus sounded: yes      Uterus sound depth (cm):  6    Specimen collected: specimen collected and sent to pathology      Patient tolerated procedure well with no complications: yes    Findings:     Cervix comment:  Posterior lip with vascular stalk from previous polyp - silver nitrate applied    "

## 2025-05-10 ENCOUNTER — HOSPITAL ENCOUNTER (OUTPATIENT)
Dept: CT IMAGING | Facility: HOSPITAL | Age: 70
Discharge: HOME | End: 2025-05-10
Attending: UROLOGY
Payer: MEDICARE

## 2025-05-10 DIAGNOSIS — N20.0 CALCULUS OF KIDNEY: ICD-10-CM

## 2025-05-10 PROCEDURE — 74176 CT ABD & PELVIS W/O CONTRAST: CPT

## 2025-05-12 PROCEDURE — 88305 TISSUE EXAM BY PATHOLOGIST: CPT | Performed by: SPECIALIST

## 2025-05-13 ENCOUNTER — RESULTS FOLLOW-UP (OUTPATIENT)
Age: 70
End: 2025-05-13

## 2025-05-20 DIAGNOSIS — F41.9 ANXIETY: ICD-10-CM

## 2025-05-20 RX ORDER — LORAZEPAM 0.5 MG/1
0.5 TABLET ORAL DAILY PRN
Qty: 30 TABLET | Refills: 0 | Status: SHIPPED | OUTPATIENT
Start: 2025-05-20

## 2025-06-25 DIAGNOSIS — F41.9 ANXIETY: ICD-10-CM

## 2025-06-26 ENCOUNTER — OFFICE VISIT (OUTPATIENT)
Dept: OBGYN CLINIC | Facility: OTHER | Age: 70
End: 2025-06-26
Payer: MEDICARE

## 2025-06-26 VITALS — WEIGHT: 153 LBS | HEIGHT: 65 IN | BODY MASS INDEX: 25.49 KG/M2

## 2025-06-26 DIAGNOSIS — M70.61 TROCHANTERIC BURSITIS OF RIGHT HIP: Primary | ICD-10-CM

## 2025-06-26 DIAGNOSIS — M54.41 ACUTE RIGHT-SIDED LOW BACK PAIN WITH RIGHT-SIDED SCIATICA: ICD-10-CM

## 2025-06-26 DIAGNOSIS — G89.29 CHRONIC RIGHT HIP PAIN: ICD-10-CM

## 2025-06-26 DIAGNOSIS — M25.551 CHRONIC RIGHT HIP PAIN: ICD-10-CM

## 2025-06-26 PROCEDURE — 99214 OFFICE O/P EST MOD 30 MIN: CPT | Performed by: FAMILY MEDICINE

## 2025-06-26 PROCEDURE — 20610 DRAIN/INJ JOINT/BURSA W/O US: CPT | Performed by: FAMILY MEDICINE

## 2025-06-26 RX ORDER — TRIAMCINOLONE ACETONIDE 40 MG/ML
40 INJECTION, SUSPENSION INTRA-ARTICULAR; INTRAMUSCULAR
Status: COMPLETED | OUTPATIENT
Start: 2025-06-26 | End: 2025-06-26

## 2025-06-26 RX ORDER — LIDOCAINE HYDROCHLORIDE 10 MG/ML
8 INJECTION, SOLUTION INFILTRATION; PERINEURAL
Status: COMPLETED | OUTPATIENT
Start: 2025-06-26 | End: 2025-06-26

## 2025-06-26 RX ORDER — LORAZEPAM 0.5 MG/1
0.5 TABLET ORAL DAILY PRN
Qty: 30 TABLET | Refills: 0 | Status: SHIPPED | OUTPATIENT
Start: 2025-06-26

## 2025-06-26 RX ADMIN — LIDOCAINE HYDROCHLORIDE 8 ML: 10 INJECTION, SOLUTION INFILTRATION; PERINEURAL at 10:00

## 2025-06-26 RX ADMIN — TRIAMCINOLONE ACETONIDE 40 MG: 40 INJECTION, SUSPENSION INTRA-ARTICULAR; INTRAMUSCULAR at 10:00

## 2025-06-26 NOTE — PROGRESS NOTES
Name: Melba Busby      : 1955      MRN: [id number]  Encounter Provider: Kana Morrell III, DO  Encounter Date: 2025   Encounter department: [facility]  :  Assessment & Plan  Trochanteric bursitis of right hip  Patient tolerated her landmark guided CSI today. We advised she may return as needed if her hip pain continues. Her previous intraarticular injection was 2025. She may receive an intraarticular CSI 2025 if the patient continues to have pain in her hip.  Orders:  •  Large joint arthrocentesis: R greater trochanteric bursa  •  lidocaine (XYLOCAINE) 1 % injection 8 mL  •  triamcinolone acetonide (Kenalog-40) 40 mg/mL injection 40 mg    Acute right-sided low back pain with right-sided sciatica  Patient advised to follow up with pain management to review her lumbar MRI.    Orders:  •  MRI lumbar spine wo contrast; Future  •  Ambulatory referral to Spine & Pain Management; Future    Chronic right hip pain             History of Present Illness   HPI  Melba Busby is a 69 y.o. female who presents for right hip pain    2/15/25:  Evaluation of right lateral hip pain ongoing for 2 months now with no specific injury event.  She traces along the lateral aspect of the thigh as source of her pain.  Intermittently she does have numbness and tingling below the knee and the lateral aspect of the calf.  On occasion intermittent back pain but today she points to right thigh as greatest source of her pain.  She describes the pain in the thigh as achy pain.    Denies fevers, history of cancer    25:  F/u right hip pain.  Patient did have lateral greater trochanteric bursa injection Coleman guided done last visit which resulted in almost complete relief of patient's pain.  She was slated for follow-up to have ultrasound-guided injection performed send she does have a history of osteoarthritis of the right hip however she had to unfortunately cancel  "due to some other health issues including bleeding and fibroids.    Today, she points to anterior lateral aspect of the hip as was her pain including the groin radiating into the thigh.    06/25/2025  Patient states that her previous intraarticular injection did result in  some of her pain but she felt the most relief from her greater trochanter CSI in February. Her pain continues to be localized to her lateral hip. She also has pain radiating down the right leg to the foot with numbness and tingling at times consistent with sciatica      Review of Systems       Objective   Ht 5' 5\" (1.651 m) Comment: verbal  Wt 69.4 kg (153 lb)   LMP  (LMP Unknown)   BMI 25.46 kg/m²      Physical Exam    BACK EXAM:  DERMATOMAL SENSATION:  L1: normal   L2: normal   L3: normal   L4: normal   L5: normal   S1: normal    STRENGTH (bilateral):  Knee Extension: 5/5  Foot Dorsiflexion: 5/5  Great Toe Extension: 5/5  Foot Plantarflexion: 5/5  Hip Flexion: 5/5  Hip Abduction: 5/5    RIGHT HIP:  +tenderness lateral hip  LOG ROLL: negative  KOLTON: negative  FADIR: negative    LEFT HIP:  LOG ROLL: negative  KOLTON: negative  FADIR: negative    __________________________________________________________    Return for Follow-up with specialist.  ____________________________________________________________________    IMAGING STUDIES: (I personally reviewed images in PACS, and if available-report):        PAST REPORTS:  CT abdomen pelvis 05/10/2025:  No urinary tract calculi.     Tiny focus of gas within the bladder dome (series 602/87). Correlate with any recent catheterization.     Small hyperdense focus upper pole left kidney is slightly more conspicuous, possibly parenchymal calcification, hemorrhagic cyst felt less likely but not excluded.     Diffuse colonic diverticulosis without diverticulitis. Cannot exclude mild chronic sigmoid wall thickening. The colon would be best evaluated with direct visualization if relevant.     Additional " "incidental findings as above.     Xray right hip 2/15/25:  No acute fracture or dislocation.     Moderate hip osteoarthritis. Mild left hip osteoarthritic degenerative changes are also present.     No lytic or blastic osseous lesion.     Unremarkable soft tissues.     Degenerative changes in the visualized lower lumbar spine.     IMPRESSION:     No acute osseous abnormality.     Degenerative changes as described.    ____________________________________________________________________    Large joint arthrocentesis: R greater trochanteric bursa    Performed by: Kaan Morrell III, DO  Authorized by: Kana Morrell III, DO    Universal Protocol:  Procedure performed by: (Enrique Cano DO)  Consent: Verbal consent obtained  Risks and benefits: risks, benefits and alternatives were discussed  Consent given by: patient  Time out: Immediately prior to procedure a \"time out\" was called to verify the correct patient, procedure, equipment, support staff and site/side marked as required.  Patient understanding: patient states understanding of the procedure being performed  Site marked: the operative site was marked  Patient identity confirmed: verbally with patient  Supporting Documentation  Indications: pain and diagnostic evaluation     Is this a Visco injection? NoProcedure Details  Location: hip - R greater trochanteric bursa  Preparation: Patient was prepped and draped in the usual sterile fashion  Needle size: 22 G  Ultrasound guidance: no  Approach: lateral  Medications administered: 8 mL lidocaine 1 %; 40 mg triamcinolone acetonide 40 mg/mL    Patient tolerance: patient tolerated the procedure well with no immediate complications  Dressing:  Sterile dressing applied        ____________________________________________________________________    Past Medical History[1]  Past Surgical History[2]  Social History   Social History     Substance and Sexual Activity   Alcohol Use Yes   • Alcohol/week: 14.0 standard " drinks of alcohol   • Types: 14 Glasses of wine per week    Comment: Social weekly     Social History     Substance and Sexual Activity   Drug Use No     Tobacco Use History[3]  Family History[4]  Allergies[5]  ____________________________________________________________________    Patient instructions below verbally summarized in person during encounter:  There are no Patient Instructions on file for this visit.                  [1]  Past Medical History:  Diagnosis Date   • Anxiety    • Arthritis    • Disease of thyroid gland    • Fatty liver    • Hypertension    [2]  Past Surgical History:  Procedure Laterality Date   • BREAST CYST EXCISION Left     Benign   • BREAST LUMPECTOMY     • COLONOSCOPY  2023   • THYROID SURGERY      Total Thyroidectomy   [3]  Social History  Tobacco Use   Smoking Status Former   • Current packs/day: 0.00   • Average packs/day: 1 pack/day for 30.0 years (30.0 ttl pk-yrs)   • Types: Cigarettes   • Start date: 1967   • Quit date: 1997   • Years since quittin.5   • Passive exposure: Past   Smokeless Tobacco Never   [4]  Family History  Problem Relation Name Age of Onset   • Arthritis Mother Mary Mckeon    • Lung cancer Mother Mary Mckeon 69   • Lung cancer Father  69   • No Known Problems Daughter     • No Known Problems Daughter     • Diabetes Maternal Grandmother Janey         Mellitus   • No Known Problems Maternal Grandfather     • Diabetes Paternal Grandmother Zandra         Mellitus   • No Known Problems Paternal Grandfather     • Lung cancer Brother  70   • COPD Brother     • Cancer Brother Petr Mckeon 70        Unknown origin   • No Known Problems Paternal Aunt     • No Known Problems Paternal Aunt     • No Known Problems Paternal Aunt     • Breast cancer Neg Hx     • Colon cancer Neg Hx     • Endometrial cancer Neg Hx     • Ovarian cancer Neg Hx     [5]  No Known Allergies

## 2025-07-14 ENCOUNTER — HOSPITAL ENCOUNTER (OUTPATIENT)
Dept: MRI IMAGING | Facility: HOSPITAL | Age: 70
Discharge: HOME | End: 2025-07-14
Payer: MEDICARE

## 2025-07-14 DIAGNOSIS — M54.41 ACUTE RIGHT-SIDED LOW BACK PAIN WITH RIGHT-SIDED SCIATICA: ICD-10-CM

## 2025-07-14 PROCEDURE — 72148 MRI LUMBAR SPINE W/O DYE: CPT

## 2025-07-30 DIAGNOSIS — F41.9 ANXIETY: ICD-10-CM

## 2025-07-31 RX ORDER — LORAZEPAM 0.5 MG/1
0.5 TABLET ORAL DAILY PRN
Qty: 30 TABLET | Refills: 0 | Status: SHIPPED | OUTPATIENT
Start: 2025-07-31

## 2025-08-01 ENCOUNTER — HOSPITAL ENCOUNTER (OUTPATIENT)
Dept: MAMMOGRAPHY | Facility: CLINIC | Age: 70
Discharge: HOME | End: 2025-08-01
Attending: FAMILY MEDICINE
Payer: MEDICARE

## 2025-08-01 VITALS — BODY MASS INDEX: 25.49 KG/M2 | WEIGHT: 153 LBS | HEIGHT: 65 IN

## 2025-08-01 DIAGNOSIS — Z12.31 ENCOUNTER FOR SCREENING MAMMOGRAM FOR BREAST CANCER: ICD-10-CM

## 2025-08-01 PROCEDURE — 77063 BREAST TOMOSYNTHESIS BI: CPT

## 2025-08-01 PROCEDURE — 77067 SCR MAMMO BI INCL CAD: CPT

## 2025-08-03 DIAGNOSIS — I10 BENIGN ESSENTIAL HYPERTENSION: ICD-10-CM

## 2025-08-03 DIAGNOSIS — I10 ESSENTIAL HYPERTENSION: ICD-10-CM

## 2025-08-04 RX ORDER — ATENOLOL 25 MG/1
25 TABLET ORAL DAILY
Qty: 90 TABLET | Refills: 1 | Status: SHIPPED | OUTPATIENT
Start: 2025-08-04